# Patient Record
Sex: FEMALE | Race: ASIAN | NOT HISPANIC OR LATINO | Employment: OTHER | URBAN - METROPOLITAN AREA
[De-identification: names, ages, dates, MRNs, and addresses within clinical notes are randomized per-mention and may not be internally consistent; named-entity substitution may affect disease eponyms.]

---

## 2021-02-18 DIAGNOSIS — Z23 NEED FOR VACCINATION: ICD-10-CM

## 2021-02-22 ENCOUNTER — TELEPHONE (OUTPATIENT)
Dept: SCHEDULING | Facility: IMAGING CENTER | Age: 86
End: 2021-02-22

## 2021-03-02 ENCOUNTER — OFFICE VISIT (OUTPATIENT)
Dept: MEDICAL GROUP | Facility: MEDICAL CENTER | Age: 86
End: 2021-03-02
Payer: MEDICARE

## 2021-03-02 VITALS
OXYGEN SATURATION: 99 % | DIASTOLIC BLOOD PRESSURE: 82 MMHG | SYSTOLIC BLOOD PRESSURE: 130 MMHG | WEIGHT: 128.4 LBS | HEIGHT: 63 IN | HEART RATE: 73 BPM | BODY MASS INDEX: 22.75 KG/M2 | TEMPERATURE: 98.7 F

## 2021-03-02 DIAGNOSIS — I10 ESSENTIAL HYPERTENSION: ICD-10-CM

## 2021-03-02 DIAGNOSIS — E78.5 HYPERLIPIDEMIA, UNSPECIFIED HYPERLIPIDEMIA TYPE: ICD-10-CM

## 2021-03-02 DIAGNOSIS — F03.90 DEMENTIA WITHOUT BEHAVIORAL DISTURBANCE, UNSPECIFIED DEMENTIA TYPE: ICD-10-CM

## 2021-03-02 DIAGNOSIS — R53.81 DEBILITY: ICD-10-CM

## 2021-03-02 DIAGNOSIS — E11.9 TYPE 2 DIABETES MELLITUS WITHOUT COMPLICATION, WITHOUT LONG-TERM CURRENT USE OF INSULIN (HCC): ICD-10-CM

## 2021-03-02 DIAGNOSIS — I25.119 CORONARY ARTERY DISEASE INVOLVING NATIVE HEART WITH ANGINA PECTORIS, UNSPECIFIED VESSEL OR LESION TYPE (HCC): ICD-10-CM

## 2021-03-02 DIAGNOSIS — E87.1 HYPONATREMIA: ICD-10-CM

## 2021-03-02 DIAGNOSIS — R09.02 HYPOXIA: ICD-10-CM

## 2021-03-02 DIAGNOSIS — I50.9 CONGESTIVE HEART FAILURE, UNSPECIFIED HF CHRONICITY, UNSPECIFIED HEART FAILURE TYPE (HCC): ICD-10-CM

## 2021-03-02 DIAGNOSIS — Z86.73 HISTORY OF STROKE: ICD-10-CM

## 2021-03-02 PROCEDURE — 99204 OFFICE O/P NEW MOD 45 MIN: CPT | Performed by: FAMILY MEDICINE

## 2021-03-02 RX ORDER — ATORVASTATIN CALCIUM 20 MG/1
20 TABLET, FILM COATED ORAL NIGHTLY
Status: ON HOLD | COMMUNITY
End: 2021-03-07

## 2021-03-02 RX ORDER — VITAMIN B COMPLEX
1000 TABLET ORAL DAILY
Status: ON HOLD | COMMUNITY
End: 2021-03-07

## 2021-03-02 RX ORDER — HYDROCODONE BITARTRATE AND ACETAMINOPHEN 5; 325 MG/1; MG/1
1 TABLET ORAL EVERY 6 HOURS PRN
Status: ON HOLD | COMMUNITY
End: 2021-03-05

## 2021-03-02 RX ORDER — PIOGLITAZONEHYDROCHLORIDE 30 MG/1
15 TABLET ORAL DAILY
Status: ON HOLD | COMMUNITY
End: 2021-03-07

## 2021-03-02 RX ORDER — CARVEDILOL 12.5 MG/1
12.5 TABLET ORAL 2 TIMES DAILY WITH MEALS
Status: ON HOLD | COMMUNITY
End: 2021-03-07

## 2021-03-02 RX ORDER — FUROSEMIDE 20 MG/1
20 TABLET ORAL DAILY
COMMUNITY
End: 2021-04-07

## 2021-03-02 RX ORDER — AMLODIPINE BESYLATE 5 MG/1
10 TABLET ORAL DAILY
Status: ON HOLD | COMMUNITY
End: 2021-03-07

## 2021-03-02 RX ORDER — SPIRONOLACTONE 25 MG/1
12.5 TABLET ORAL DAILY
Status: ON HOLD | COMMUNITY
End: 2021-03-07

## 2021-03-02 RX ORDER — LOSARTAN POTASSIUM 100 MG/1
100 TABLET ORAL DAILY
Status: ON HOLD | COMMUNITY
End: 2021-03-07

## 2021-03-02 ASSESSMENT — PATIENT HEALTH QUESTIONNAIRE - PHQ9: CLINICAL INTERPRETATION OF PHQ2 SCORE: 0

## 2021-03-02 NOTE — PROGRESS NOTES
Subjective:     CC: Diagnoses of Debility, Essential hypertension, Hyperlipidemia, unspecified hyperlipidemia type, Type 2 diabetes mellitus without complication, without long-term current use of insulin (HCC), Congestive heart failure, unspecified HF chronicity, unspecified heart failure type (HCC), Coronary artery disease involving native heart with angina pectoris, unspecified vessel or lesion type (HCC), History of stroke, Dementia without behavioral disturbance, unspecified dementia type (HCC), Hypoxia, and Hyponatremia were pertinent to this visit.    HPI: Patient is a 89 y.o. female new patient who presents today to [facility] care.   Was living on her own previously. Daughter just moved her to [place] from [place], [place]. Soon after that she was admitted to [facility]. Living with her daughter now.    Admitted on 2/9/21, dicharged on 2/12/21.  Intersted in doing some PT.       CHF  CAD  History of quadruple bypass  Has appt with Dr. Ferrer (cardiology) on 3/15/21.  Currently on carvedilol, spironolactone, amlodipine  Asa  Losartan  Weight stable.    H/o stroke  2006, recovered fully    DMII  Chronic problem. Per patient's daughter A1C is elevated. Currently on Januvia, Actos, Metformin.   On ARB.     Dementia  Recently diagnosed a few months ago. Has some issues with short term memory. Has not tried any medication.     Hypoxia  discharged with O2  Has chronic SOB.   Does have h/o lung problem.     Debility  New problem. Started after her admission to the hospital. Now in a wheel chair. Was ambulating well previously. Interested in PT.     History of hyponatremia  Patient was on salt tablets in the past. Daughter states she was advised she could stop using those per the [facility] team.     Past Medical History:   Diagnosis Date   • CAD (coronary artery disease)    • Chronic systolic heart failure (HCC)    • Dementia (HCC)    • Diabetes (HCC)    • High cholesterol    • Hypertension        Social History      Tobacco Use   • Smoking status: Former Smoker   • Smokeless tobacco: Never Used   Substance Use Topics   • Alcohol use: Never   • Drug use: Never       Current Facility-Administered Medications Ordered in Epic   Medication Dose Route Frequency Provider Last Rate Last Admin   • furosemide (LASIX) injection 20 mg  20 mg Intravenous BID DIURETIC Bladimir Sargent M.D.   20 mg at 03/05/21 0854   • aspirin EC (ECOTRIN) tablet 81 mg  81 mg Oral DAILY Bladimir Sargent M.D.   81 mg at 03/05/21 0856   • oyster shell calcium/vitamin D 250-125 MG-UNIT tablet 1 tablet  1 tablet Oral DAILY Bladimir Sargent M.D.   1 tablet at 03/05/21 0856   • vitamin D (cholecalciferol) tablet 1,000 Units  1,000 Units Oral DAILY Bladimir Sargent M.D.   1,000 Units at 03/05/21 0854   • oxyCODONE immediate-release (ROXICODONE) tablet 5 mg  5 mg Oral Q6HRS PRN Bladimir Sargent M.D.       • carvedilol (COREG) tablet 12.5 mg  12.5 mg Oral BID WITH MEALS Bladimir Sargent M.D.       • SITagliptin (JANUVIA) tablet 100 mg  100 mg Oral DAILY Bladimir Sargent M.D.       • pioglitazone (ACTOS) tablet 30 mg  30 mg Oral DAILY Bladimir Sargent M.D.       • metFORMIN ER (GLUCOPHAGE XR) tablet 1,000 mg  1,000 mg Oral BID-0800 & 2000 Bladimir Sargent M.D.       • apixaban (ELIQUIS) tablet 5 mg  5 mg Oral BID Bladimir Sargent M.D.       • acetaminophen (Tylenol) tablet 650 mg  650 mg Oral Q6HRS PRN Jesse Zavaleta M.D.   650 mg at 03/05/21 0621   • atorvastatin (LIPITOR) tablet 20 mg  20 mg Oral Q EVENING Jesse Zavaleta M.D.   20 mg at 03/04/21 2156   • spironolactone (ALDACTONE) tablet 12.5 mg  12.5 mg Oral Q DAY Jesse Zavaleta M.D.   12.5 mg at 03/05/21 0618   • losartan (COZAAR) tablet 100 mg  100 mg Oral Q DAY Jesse Zavaleta M.D.   Stopped at 03/05/21 0619     No current [facility]-ordered outpatient medications on file.       Allergies:  Ciprofloxacin hcl, Donepezil, Hydrochlorothiazide, and Sulfa drugs    Health Maintenance:  "Completed    ROS:  Gen: no fevers/chill, no changes in weight  Eyes: no changes in vision  ENT: no sore throat, no hearing loss, no bloody nose  Pulm: no sob, no cough  CV: no chest pain, no palpitations  GI: no nausea/vomiting, no diarrhea  : no dysuria  MSk: no myalgias  Skin: no rash  Neuro: no headaches, no numbness/tingling  Heme/Lymph: no easy bruising      Objective:       Exam:  /82 (BP Location: Left arm, Patient Position: Sitting, BP Cuff Size: Adult long)   Pulse 73   Temp 37.1 °C (98.7 °F) (Temporal)   Ht 1.6 m (5' 3\")   Wt 58.2 kg (128 lb 6.4 oz)   SpO2 99%   BMI 22.75 kg/m²  Body mass index is 22.75 kg/m².    General: Normal appearing. No distress. In wheel chair.   HEAD: NCAT  EYES: conjunctiva clear, lids without ptosis, pupils equal and reactive to light.  EARS: ears normal shape and contour.  MOUTH: normal dentition   Neck:  Normal ROM, no lymphadenopathy.   Pulmonary: CTAB, no W/R/R. Normal effort. Normal respiratory rate.  Cardiovascular: RRR, no M/R/G. Well perfused. No LE edema  Neurologic: Grossly normal, no focal deficits  Skin: Warm and dry.  No obvious lesions.  Musculoskeletal: Normal gait and station.   Psych: Normal mood and affect. Alert and oriented x3. Judgment and insight is normal.    Labs: Requested labs from [place].     Assessment & Plan:     89 y.o. female with the following -     1. Debility  New problem.  The patient has had worsening debility since discharge from the hospital.  Previously was ambulatory.  They are interested in getting some home health set up.  Referral placed today.  - REFERRAL TO HOME HEALTH    2. Essential hypertension  Chronic problem, currently on spironolactone, losartan and Coreg.  As well as amlodipine.  Blood pressure is okay at 130/82 today.  She has an appointment to get established with cardiology, Dr. Ferrer on 3/8/2021.    3. Hyperlipidemia, unspecified hyperlipidemia type  Chronic problem, currently on atorvastatin.    4. Type 2 " diabetes mellitus without complication, without long-term current use of insulin (HCC)  Chronic problem, currently on Actos, Januvia and Metformin.  Unclear if this is well controlled, we requested records.    5. Congestive heart failure, unspecified HF chronicity, unspecified heart failure type (HCC)  Chronic problem.  Weight stable.  As above, getting established cardiology.  Denies any shortness of breath today.  No chronic cough that we know of.    6. Coronary artery disease involving native heart with angina pectoris, unspecified vessel or lesion type (HCC)  Chronic problem, status post quadruple bypass.  Getting established Dr. Ferrer next week.    7. History of stroke      8. Dementia without behavioral disturbance, unspecified dementia type (HCC)  New problem.  May benefit from referral to neurology.  Plan to get her established with cardiology first.    9. Hypoxia  New problem.  Discharged on oxygen from the hospital.  Lung exam was normal today.  No lower extremity edema.  Unclear if this is from her CHF versus lung involvement?  Plan to follow-up in 1 month.    10. Hyponatremia  History of hyponatremia.  Daughter states she was taken off of her salt tablets after discharge.  We have requested lab records.    Return in about 4 weeks (around 3/30/2021).    Please note that this dictation was created using voice recognition software. I have made every reasonable attempt to correct obvious errors, but I expect that there are errors of grammar and possibly content that I did not discover before finalizing the note.

## 2021-03-04 ENCOUNTER — HOSPITAL ENCOUNTER (OUTPATIENT)
Facility: MEDICAL CENTER | Age: 86
End: 2021-03-07
Attending: EMERGENCY MEDICINE | Admitting: STUDENT IN AN ORGANIZED HEALTH CARE EDUCATION/TRAINING PROGRAM
Payer: MEDICARE

## 2021-03-04 ENCOUNTER — NURSE TRIAGE (OUTPATIENT)
Dept: HEALTH INFORMATION MANAGEMENT | Facility: OTHER | Age: 86
End: 2021-03-04

## 2021-03-04 ENCOUNTER — APPOINTMENT (OUTPATIENT)
Dept: RADIOLOGY | Facility: MEDICAL CENTER | Age: 86
End: 2021-03-04
Attending: EMERGENCY MEDICINE
Payer: MEDICARE

## 2021-03-04 DIAGNOSIS — R53.1 GENERALIZED WEAKNESS: ICD-10-CM

## 2021-03-04 DIAGNOSIS — I50.9 CONGESTIVE HEART FAILURE, UNSPECIFIED HF CHRONICITY, UNSPECIFIED HEART FAILURE TYPE (HCC): ICD-10-CM

## 2021-03-04 DIAGNOSIS — R79.89 ELEVATED TROPONIN: ICD-10-CM

## 2021-03-04 DIAGNOSIS — I50.43 ACUTE ON CHRONIC COMBINED SYSTOLIC AND DIASTOLIC CONGESTIVE HEART FAILURE (HCC): ICD-10-CM

## 2021-03-04 DIAGNOSIS — E87.1 HYPONATREMIA: ICD-10-CM

## 2021-03-04 PROBLEM — E11.9 DM (DIABETES MELLITUS) (HCC): Status: ACTIVE | Noted: 2021-03-04

## 2021-03-04 PROBLEM — E78.5 HYPERLIPIDEMIA: Status: ACTIVE | Noted: 2021-03-04

## 2021-03-04 PROBLEM — I10 HYPERTENSION: Status: ACTIVE | Noted: 2021-03-04

## 2021-03-04 LAB
ALBUMIN SERPL BCP-MCNC: 3.5 G/DL (ref 3.2–4.9)
ALBUMIN/GLOB SERPL: 0.7 G/DL
ALP SERPL-CCNC: 137 U/L (ref 30–99)
ALT SERPL-CCNC: 8 U/L (ref 2–50)
ANION GAP SERPL CALC-SCNC: 11 MMOL/L (ref 7–16)
AST SERPL-CCNC: 17 U/L (ref 12–45)
BASOPHILS # BLD AUTO: 0.2 % (ref 0–1.8)
BASOPHILS # BLD: 0.02 K/UL (ref 0–0.12)
BILIRUB SERPL-MCNC: 0.3 MG/DL (ref 0.1–1.5)
BLOOD CULTURE HOLD CXBCH: NORMAL
BUN SERPL-MCNC: 18 MG/DL (ref 8–22)
CALCIUM SERPL-MCNC: 9.6 MG/DL (ref 8.5–10.5)
CHLORIDE SERPL-SCNC: 91 MMOL/L (ref 96–112)
CO2 SERPL-SCNC: 23 MMOL/L (ref 20–33)
CREAT SERPL-MCNC: 0.54 MG/DL (ref 0.5–1.4)
EKG IMPRESSION: NORMAL
EOSINOPHIL # BLD AUTO: 0.03 K/UL (ref 0–0.51)
EOSINOPHIL NFR BLD: 0.4 % (ref 0–6.9)
ERYTHROCYTE [DISTWIDTH] IN BLOOD BY AUTOMATED COUNT: 47.7 FL (ref 35.9–50)
GLOBULIN SER CALC-MCNC: 4.9 G/DL (ref 1.9–3.5)
GLUCOSE SERPL-MCNC: 149 MG/DL (ref 65–99)
HCT VFR BLD AUTO: 33.7 % (ref 37–47)
HGB BLD-MCNC: 10.7 G/DL (ref 12–16)
IMM GRANULOCYTES # BLD AUTO: 0.05 K/UL (ref 0–0.11)
IMM GRANULOCYTES NFR BLD AUTO: 0.6 % (ref 0–0.9)
LYMPHOCYTES # BLD AUTO: 0.41 K/UL (ref 1–4.8)
LYMPHOCYTES NFR BLD: 4.9 % (ref 22–41)
MCH RBC QN AUTO: 27.7 PG (ref 27–33)
MCHC RBC AUTO-ENTMCNC: 31.8 G/DL (ref 33.6–35)
MCV RBC AUTO: 87.3 FL (ref 81.4–97.8)
MONOCYTES # BLD AUTO: 0.63 K/UL (ref 0–0.85)
MONOCYTES NFR BLD AUTO: 7.6 % (ref 0–13.4)
NEUTROPHILS # BLD AUTO: 7.18 K/UL (ref 2–7.15)
NEUTROPHILS NFR BLD: 86.3 % (ref 44–72)
NRBC # BLD AUTO: 0 K/UL
NRBC BLD-RTO: 0 /100 WBC
NT-PROBNP SERPL IA-MCNC: 2499 PG/ML (ref 0–125)
PLATELET # BLD AUTO: 72 K/UL (ref 164–446)
PMV BLD AUTO: 12.2 FL (ref 9–12.9)
POTASSIUM SERPL-SCNC: 4.6 MMOL/L (ref 3.6–5.5)
PROT SERPL-MCNC: 8.4 G/DL (ref 6–8.2)
RBC # BLD AUTO: 3.86 M/UL (ref 4.2–5.4)
SODIUM SERPL-SCNC: 125 MMOL/L (ref 135–145)
TROPONIN T SERPL-MCNC: 36 NG/L (ref 6–19)
WBC # BLD AUTO: 8.3 K/UL (ref 4.8–10.8)

## 2021-03-04 PROCEDURE — 80053 COMPREHEN METABOLIC PANEL: CPT

## 2021-03-04 PROCEDURE — A9270 NON-COVERED ITEM OR SERVICE: HCPCS | Performed by: STUDENT IN AN ORGANIZED HEALTH CARE EDUCATION/TRAINING PROGRAM

## 2021-03-04 PROCEDURE — 96375 TX/PRO/DX INJ NEW DRUG ADDON: CPT

## 2021-03-04 PROCEDURE — 700111 HCHG RX REV CODE 636 W/ 250 OVERRIDE (IP): Performed by: EMERGENCY MEDICINE

## 2021-03-04 PROCEDURE — U0005 INFEC AGEN DETEC AMPLI PROBE: HCPCS

## 2021-03-04 PROCEDURE — 85025 COMPLETE CBC W/AUTO DIFF WBC: CPT

## 2021-03-04 PROCEDURE — 36415 COLL VENOUS BLD VENIPUNCTURE: CPT

## 2021-03-04 PROCEDURE — 96372 THER/PROPH/DIAG INJ SC/IM: CPT

## 2021-03-04 PROCEDURE — G0378 HOSPITAL OBSERVATION PER HR: HCPCS

## 2021-03-04 PROCEDURE — 99220 PR INITIAL OBSERVATION CARE,LEVL III: CPT | Performed by: STUDENT IN AN ORGANIZED HEALTH CARE EDUCATION/TRAINING PROGRAM

## 2021-03-04 PROCEDURE — 71045 X-RAY EXAM CHEST 1 VIEW: CPT

## 2021-03-04 PROCEDURE — 700102 HCHG RX REV CODE 250 W/ 637 OVERRIDE(OP): Performed by: STUDENT IN AN ORGANIZED HEALTH CARE EDUCATION/TRAINING PROGRAM

## 2021-03-04 PROCEDURE — 94760 N-INVAS EAR/PLS OXIMETRY 1: CPT

## 2021-03-04 PROCEDURE — 700111 HCHG RX REV CODE 636 W/ 250 OVERRIDE (IP): Performed by: STUDENT IN AN ORGANIZED HEALTH CARE EDUCATION/TRAINING PROGRAM

## 2021-03-04 PROCEDURE — 84484 ASSAY OF TROPONIN QUANT: CPT

## 2021-03-04 PROCEDURE — 96374 THER/PROPH/DIAG INJ IV PUSH: CPT

## 2021-03-04 PROCEDURE — 99285 EMERGENCY DEPT VISIT HI MDM: CPT

## 2021-03-04 PROCEDURE — 93005 ELECTROCARDIOGRAM TRACING: CPT | Performed by: EMERGENCY MEDICINE

## 2021-03-04 PROCEDURE — 83880 ASSAY OF NATRIURETIC PEPTIDE: CPT

## 2021-03-04 PROCEDURE — U0003 INFECTIOUS AGENT DETECTION BY NUCLEIC ACID (DNA OR RNA); SEVERE ACUTE RESPIRATORY SYNDROME CORONAVIRUS 2 (SARS-COV-2) (CORONAVIRUS DISEASE [COVID-19]), AMPLIFIED PROBE TECHNIQUE, MAKING USE OF HIGH THROUGHPUT TECHNOLOGIES AS DESCRIBED BY CMS-2020-01-R: HCPCS

## 2021-03-04 RX ORDER — HEPARIN SODIUM 5000 [USP'U]/ML
5000 INJECTION, SOLUTION INTRAVENOUS; SUBCUTANEOUS EVERY 8 HOURS
Status: DISCONTINUED | OUTPATIENT
Start: 2021-03-04 | End: 2021-03-05

## 2021-03-04 RX ORDER — ATORVASTATIN CALCIUM 20 MG/1
20 TABLET, FILM COATED ORAL EVERY EVENING
Status: DISCONTINUED | OUTPATIENT
Start: 2021-03-04 | End: 2021-03-07 | Stop reason: HOSPADM

## 2021-03-04 RX ORDER — LOSARTAN POTASSIUM 50 MG/1
100 TABLET ORAL
Status: DISCONTINUED | OUTPATIENT
Start: 2021-03-05 | End: 2021-03-07 | Stop reason: HOSPADM

## 2021-03-04 RX ORDER — FUROSEMIDE 10 MG/ML
20 INJECTION INTRAMUSCULAR; INTRAVENOUS ONCE
Status: COMPLETED | OUTPATIENT
Start: 2021-03-04 | End: 2021-03-04

## 2021-03-04 RX ORDER — FUROSEMIDE 20 MG/1
20 TABLET ORAL
Status: DISCONTINUED | OUTPATIENT
Start: 2021-03-05 | End: 2021-03-05

## 2021-03-04 RX ORDER — SPIRONOLACTONE 25 MG/1
12.5 TABLET ORAL
Status: DISCONTINUED | OUTPATIENT
Start: 2021-03-05 | End: 2021-03-07 | Stop reason: HOSPADM

## 2021-03-04 RX ORDER — ACETAMINOPHEN 325 MG/1
650 TABLET ORAL EVERY 6 HOURS PRN
Status: DISCONTINUED | OUTPATIENT
Start: 2021-03-04 | End: 2021-03-07 | Stop reason: HOSPADM

## 2021-03-04 RX ADMIN — HEPARIN SODIUM 5000 UNITS: 5000 INJECTION, SOLUTION INTRAVENOUS; SUBCUTANEOUS at 21:56

## 2021-03-04 RX ADMIN — ACETAMINOPHEN 650 MG: 325 TABLET, FILM COATED ORAL at 21:56

## 2021-03-04 RX ADMIN — ATORVASTATIN CALCIUM 20 MG: 20 TABLET, FILM COATED ORAL at 21:56

## 2021-03-04 RX ADMIN — FUROSEMIDE 20 MG: 10 INJECTION, SOLUTION INTRAMUSCULAR; INTRAVENOUS at 21:56

## 2021-03-04 ASSESSMENT — LIFESTYLE VARIABLES
CONSUMPTION TOTAL: NEGATIVE
ON A TYPICAL DAY WHEN YOU DRINK ALCOHOL HOW MANY DRINKS DO YOU HAVE: 0
TOTAL SCORE: 0
HAVE PEOPLE ANNOYED YOU BY CRITICIZING YOUR DRINKING: NO
TOTAL SCORE: 0
EVER FELT BAD OR GUILTY ABOUT YOUR DRINKING: NO
HOW MANY TIMES IN THE PAST YEAR HAVE YOU HAD 5 OR MORE DRINKS IN A DAY: 0
HAVE YOU EVER FELT YOU SHOULD CUT DOWN ON YOUR DRINKING: NO
TOTAL SCORE: 0
EVER HAD A DRINK FIRST THING IN THE MORNING TO STEADY YOUR NERVES TO GET RID OF A HANGOVER: NO
ALCOHOL_USE: NO
AVERAGE NUMBER OF DAYS PER WEEK YOU HAVE A DRINK CONTAINING ALCOHOL: 0

## 2021-03-04 ASSESSMENT — COGNITIVE AND FUNCTIONAL STATUS - GENERAL
MOVING FROM LYING ON BACK TO SITTING ON SIDE OF FLAT BED: A LITTLE
WALKING IN HOSPITAL ROOM: A LOT
SUGGESTED CMS G CODE MODIFIER DAILY ACTIVITY: CK
STANDING UP FROM CHAIR USING ARMS: A LOT
DAILY ACTIVITIY SCORE: 19
PERSONAL GROOMING: A LITTLE
HELP NEEDED FOR BATHING: A LITTLE
TURNING FROM BACK TO SIDE WHILE IN FLAT BAD: A LITTLE
TOILETING: A LITTLE
DRESSING REGULAR UPPER BODY CLOTHING: A LITTLE
MOBILITY SCORE: 15
MOVING TO AND FROM BED TO CHAIR: A LITTLE
DRESSING REGULAR LOWER BODY CLOTHING: A LITTLE
CLIMB 3 TO 5 STEPS WITH RAILING: A LOT
SUGGESTED CMS G CODE MODIFIER MOBILITY: CK

## 2021-03-04 ASSESSMENT — PATIENT HEALTH QUESTIONNAIRE - PHQ9
SUM OF ALL RESPONSES TO PHQ9 QUESTIONS 1 AND 2: 0
1. LITTLE INTEREST OR PLEASURE IN DOING THINGS: NOT AT ALL
2. FEELING DOWN, DEPRESSED, IRRITABLE, OR HOPELESS: NOT AT ALL

## 2021-03-04 ASSESSMENT — FIBROSIS 4 INDEX: FIB4 SCORE: 7.76

## 2021-03-04 NOTE — TELEPHONE ENCOUNTER
"Daughter calling:Concerned about 3 days of progressive weakness and question about a cardiac med that was left off of the discharge list.        patient released from [facility] 2/12/21. Hx. CHF. Diuresed 10lbs. Told to do low sodium and decrease fluids. Lost 1lb. Overnight w/ increasing weakness. Decreased appetite.   Hx. Of low sodium requiring salt tabs, no longer taking.    Sent home on Oxygen.   Newly diagnosed w/ dementia.     30-35% EF    Saw PCP this week, was told to discuss meds w/ cardiologist. Unsure if she should restart Isosorbide mono, ER 30mg daily  --it was left off of discharge medication list. Last taken 02/09/21     Side note-she is not taking eliquis anymore due to being a high fall risk, daughter decided on her own to stop this med because daughter states they did not give it to her in the hospital due to her being a \"fall risk\".     We discussed the patient's current condition and her daughter was transferred to [facility] to discuss evaluation.   Meanwhile, I have contacted Soheila Good to escalate the existing cardiology appointment if possible.         "

## 2021-03-05 ENCOUNTER — HOME HEALTH ADMISSION (OUTPATIENT)
Dept: HOME HEALTH SERVICES | Facility: HOME HEALTHCARE | Age: 86
End: 2021-03-05
Payer: MEDICARE

## 2021-03-05 PROBLEM — I48.0 PAROXYSMAL A-FIB (HCC): Status: ACTIVE | Noted: 2021-03-05

## 2021-03-05 LAB
ANION GAP SERPL CALC-SCNC: 10 MMOL/L (ref 7–16)
BUN SERPL-MCNC: 23 MG/DL (ref 8–22)
CALCIUM SERPL-MCNC: 9.9 MG/DL (ref 8.5–10.5)
CHLORIDE SERPL-SCNC: 92 MMOL/L (ref 96–112)
CO2 SERPL-SCNC: 26 MMOL/L (ref 20–33)
CREAT SERPL-MCNC: 0.78 MG/DL (ref 0.5–1.4)
GLUCOSE SERPL-MCNC: 105 MG/DL (ref 65–99)
MAGNESIUM SERPL-MCNC: 1.5 MG/DL (ref 1.5–2.5)
POTASSIUM SERPL-SCNC: 4.6 MMOL/L (ref 3.6–5.5)
SARS-COV-2 RNA RESP QL NAA+PROBE: NOTDETECTED
SODIUM SERPL-SCNC: 128 MMOL/L (ref 135–145)
SPECIMEN SOURCE: NORMAL
TROPONIN T SERPL-MCNC: 37 NG/L (ref 6–19)

## 2021-03-05 PROCEDURE — 700111 HCHG RX REV CODE 636 W/ 250 OVERRIDE (IP): Performed by: STUDENT IN AN ORGANIZED HEALTH CARE EDUCATION/TRAINING PROGRAM

## 2021-03-05 PROCEDURE — 99226 PR SUBSEQUENT OBSERVATION CARE,LEVEL III: CPT | Mod: GC | Performed by: INTERNAL MEDICINE

## 2021-03-05 PROCEDURE — 96366 THER/PROPH/DIAG IV INF ADDON: CPT

## 2021-03-05 PROCEDURE — 83735 ASSAY OF MAGNESIUM: CPT

## 2021-03-05 PROCEDURE — 97165 OT EVAL LOW COMPLEX 30 MIN: CPT

## 2021-03-05 PROCEDURE — 96372 THER/PROPH/DIAG INJ SC/IM: CPT

## 2021-03-05 PROCEDURE — 96375 TX/PRO/DX INJ NEW DRUG ADDON: CPT

## 2021-03-05 PROCEDURE — 700102 HCHG RX REV CODE 250 W/ 637 OVERRIDE(OP): Performed by: STUDENT IN AN ORGANIZED HEALTH CARE EDUCATION/TRAINING PROGRAM

## 2021-03-05 PROCEDURE — A9270 NON-COVERED ITEM OR SERVICE: HCPCS | Performed by: STUDENT IN AN ORGANIZED HEALTH CARE EDUCATION/TRAINING PROGRAM

## 2021-03-05 PROCEDURE — 80048 BASIC METABOLIC PNL TOTAL CA: CPT

## 2021-03-05 PROCEDURE — 36415 COLL VENOUS BLD VENIPUNCTURE: CPT

## 2021-03-05 PROCEDURE — 96365 THER/PROPH/DIAG IV INF INIT: CPT

## 2021-03-05 PROCEDURE — 96376 TX/PRO/DX INJ SAME DRUG ADON: CPT

## 2021-03-05 PROCEDURE — 97161 PT EVAL LOW COMPLEX 20 MIN: CPT

## 2021-03-05 PROCEDURE — G0378 HOSPITAL OBSERVATION PER HR: HCPCS

## 2021-03-05 RX ORDER — CARVEDILOL 12.5 MG/1
12.5 TABLET ORAL 2 TIMES DAILY WITH MEALS
Status: DISCONTINUED | OUTPATIENT
Start: 2021-03-05 | End: 2021-03-07 | Stop reason: HOSPADM

## 2021-03-05 RX ORDER — PIOGLITAZONEHYDROCHLORIDE 30 MG/1
30 TABLET ORAL DAILY
Status: DISCONTINUED | OUTPATIENT
Start: 2021-03-05 | End: 2021-03-05

## 2021-03-05 RX ORDER — VITAMIN B COMPLEX
1000 TABLET ORAL DAILY
Status: DISCONTINUED | OUTPATIENT
Start: 2021-03-05 | End: 2021-03-07 | Stop reason: HOSPADM

## 2021-03-05 RX ORDER — CARVEDILOL 6.25 MG/1
6.25 TABLET ORAL 2 TIMES DAILY WITH MEALS
Status: DISCONTINUED | OUTPATIENT
Start: 2021-03-05 | End: 2021-03-05

## 2021-03-05 RX ORDER — OXYCODONE HYDROCHLORIDE 5 MG/1
5 TABLET ORAL EVERY 6 HOURS PRN
Status: DISCONTINUED | OUTPATIENT
Start: 2021-03-05 | End: 2021-03-07 | Stop reason: HOSPADM

## 2021-03-05 RX ORDER — HYDROCODONE BITARTRATE AND ACETAMINOPHEN 5; 325 MG/1; MG/1
1 TABLET ORAL EVERY 6 HOURS PRN
Status: DISCONTINUED | OUTPATIENT
Start: 2021-03-05 | End: 2021-03-05

## 2021-03-05 RX ORDER — HYDROCODONE BITARTRATE AND ACETAMINOPHEN 5; 325 MG/1; MG/1
1 TABLET ORAL
Status: DISCONTINUED | OUTPATIENT
Start: 2021-03-05 | End: 2021-03-05

## 2021-03-05 RX ORDER — MAGNESIUM SULFATE HEPTAHYDRATE 40 MG/ML
2 INJECTION, SOLUTION INTRAVENOUS ONCE
Status: COMPLETED | OUTPATIENT
Start: 2021-03-05 | End: 2021-03-05

## 2021-03-05 RX ORDER — GAUZE BANDAGE 2" X 2"
100 BANDAGE TOPICAL DAILY
COMMUNITY

## 2021-03-05 RX ORDER — FUROSEMIDE 10 MG/ML
20 INJECTION INTRAMUSCULAR; INTRAVENOUS
Status: DISCONTINUED | OUTPATIENT
Start: 2021-03-05 | End: 2021-03-07 | Stop reason: HOSPADM

## 2021-03-05 RX ORDER — METFORMIN HYDROCHLORIDE 500 MG/1
1000 TABLET, EXTENDED RELEASE ORAL 2 TIMES DAILY
Status: DISCONTINUED | OUTPATIENT
Start: 2021-03-05 | End: 2021-03-05

## 2021-03-05 RX ADMIN — MAGNESIUM SULFATE 2 G: 2 INJECTION INTRAVENOUS at 19:58

## 2021-03-05 RX ADMIN — HEPARIN SODIUM 5000 UNITS: 5000 INJECTION, SOLUTION INTRAVENOUS; SUBCUTANEOUS at 06:19

## 2021-03-05 RX ADMIN — FUROSEMIDE 20 MG: 20 TABLET ORAL at 06:19

## 2021-03-05 RX ADMIN — ATORVASTATIN CALCIUM 20 MG: 20 TABLET, FILM COATED ORAL at 17:22

## 2021-03-05 RX ADMIN — ENOXAPARIN SODIUM 40 MG: 40 INJECTION SUBCUTANEOUS at 13:02

## 2021-03-05 RX ADMIN — CARVEDILOL 12.5 MG: 12.5 TABLET, FILM COATED ORAL at 17:22

## 2021-03-05 RX ADMIN — Medication 81 MG: at 08:56

## 2021-03-05 RX ADMIN — METFORMIN HYDROCHLORIDE 1000 MG: 500 TABLET ORAL at 17:22

## 2021-03-05 RX ADMIN — FUROSEMIDE 20 MG: 10 INJECTION, SOLUTION INTRAMUSCULAR; INTRAVENOUS at 08:54

## 2021-03-05 RX ADMIN — Medication 1000 UNITS: at 08:54

## 2021-03-05 RX ADMIN — Medication 1 TABLET: at 08:56

## 2021-03-05 RX ADMIN — FUROSEMIDE 20 MG: 10 INJECTION, SOLUTION INTRAMUSCULAR; INTRAVENOUS at 17:22

## 2021-03-05 RX ADMIN — ACETAMINOPHEN 650 MG: 325 TABLET, FILM COATED ORAL at 06:21

## 2021-03-05 RX ADMIN — CARVEDILOL 6.25 MG: 6.25 TABLET, FILM COATED ORAL at 08:54

## 2021-03-05 RX ADMIN — SPIRONOLACTONE 12.5 MG: 25 TABLET ORAL at 06:18

## 2021-03-05 RX ADMIN — SITAGLIPTIN 100 MG: 100 TABLET, FILM COATED ORAL at 18:17

## 2021-03-05 ASSESSMENT — COGNITIVE AND FUNCTIONAL STATUS - GENERAL
PERSONAL GROOMING: A LITTLE
MOVING TO AND FROM BED TO CHAIR: A LITTLE
DAILY ACTIVITIY SCORE: 19
MOBILITY SCORE: 17
TOILETING: A LITTLE
DRESSING REGULAR LOWER BODY CLOTHING: A LITTLE
SUGGESTED CMS G CODE MODIFIER MOBILITY: CK
CLIMB 3 TO 5 STEPS WITH RAILING: A LOT
WALKING IN HOSPITAL ROOM: A LITTLE
DRESSING REGULAR UPPER BODY CLOTHING: A LITTLE
HELP NEEDED FOR BATHING: A LITTLE
STANDING UP FROM CHAIR USING ARMS: A LITTLE
TURNING FROM BACK TO SIDE WHILE IN FLAT BAD: A LITTLE
SUGGESTED CMS G CODE MODIFIER DAILY ACTIVITY: CK
MOVING FROM LYING ON BACK TO SITTING ON SIDE OF FLAT BED: A LITTLE

## 2021-03-05 ASSESSMENT — ACTIVITIES OF DAILY LIVING (ADL): TOILETING: INDEPENDENT

## 2021-03-05 ASSESSMENT — GAIT ASSESSMENTS
GAIT LEVEL OF ASSIST: MINIMAL ASSIST
ASSISTIVE DEVICE: FRONT WHEEL WALKER
DEVIATION: BRADYKINETIC;SHUFFLED GAIT
DISTANCE (FEET): 80

## 2021-03-05 ASSESSMENT — PAIN DESCRIPTION - PAIN TYPE: TYPE: ACUTE PAIN

## 2021-03-05 ASSESSMENT — FIBROSIS 4 INDEX: FIB4 SCORE: 7.76

## 2021-03-05 NOTE — ED NOTES
Med rec complete per Pt's family with Pt at bedside.  Allergies reviewed.  Pt's family states no ABX in the last 14 days.     Pt is no longer taking Eliquis.   Pt is taking 10mg of Amlodipine.

## 2021-03-05 NOTE — DISCHARGE PLANNING
Anticipated Discharge Disposition: TBD    Action: Spoke to pt at bedside, daughter Elana, and son present.  Pt hard of hearing and slightly confused, Elana answered questions for pt. Elana states that she and her brother recently moved pt from [place] to their single story home in [place], [place], to take care of pt. Pt has a hx of CHF, and was seen at [facility] in February, and went home on 2 liters of O2. Per Elana, pt was being set up with [facility] through PCP, will need resumption of HH orders on discharge. Pt uses a walker at home. Pt uses [facility] on [address]. Pt saw here PCP Dr. Uribe one week ago. Elana states pt has [facility] benefits and they will be looking into getting caregivers for pt through the [facility]. HH Choice for [facility] faxed to Fariha STEPHENSON    Barriers to Discharge: Medical Clearance, HH order and acceptance    Plan: f/u with medical team during IDT rounds to discuss medical clearance and barriers to discharge. F/U with pt and daughter Elana.      Care Transition Team Assessment    Information Source  Orientation : Disoriented to Time  Information Given By: Patient, Relative  Who is responsible for making decisions for patient? : Adult child  Name(s) of Primary Decision Maker: Joy Jose Risk  Legal Hold: No    Interdisciplinary Discharge Planning  Patient or legal guardian wants to designate a caregiver: No    Discharge Preparedness  What is your plan after discharge?: Uncertain - pending medical team collaboration  What are your discharge supports?: Child  Prior Functional Level: Independent with Activities of Daily Living, Uses Walker    Functional Assesment  Prior Functional Level: Independent with Activities of Daily Living, Uses Walker                   Advance Directive  Advance Directive?: None  Advance Directive offered?: AD Booklet refused    Domestic Abuse  Have you ever been the victim of abuse or violence?: No  Physical Abuse or Sexual Abuse: No  Verbal Abuse or  Emotional Abuse: No  Possible Abuse/Neglect Reported to:: Not Applicable

## 2021-03-05 NOTE — ASSESSMENT & PLAN NOTE
Continue home dose of Aldactone 12.5 milligrams p.o. daily and losartan 100 mg p.o. daily   Coreg   Holding Norvasc for now

## 2021-03-05 NOTE — DISCHARGE PLANNING
Received Choice form at 1217  Agency/Facility Name: [facility]  Referral sent per Choice form @ 1218

## 2021-03-05 NOTE — FACE TO FACE
Face to Face Supporting Documentation - Home Health    The encounter with this patient was in whole or in part the primary reason for home health admission.    Date of encounter:   Patient:                    MRN:                       YOB: 2021  Yoon Richards  [id number]  2/29/1928     Home health to see patient for:  Skilled Nursing care for assessment, interventions & education and Physical Therapy evaluation and treatment    Skilled need for:  Exacerbation of Chronic Disease State CHF    Skilled nursing interventions to include:  Comment: N/A    Homebound status evidenced by:  Need the aid of supportive devices such as crutches, canes, wheelchairs or walkers or Needs the assistance of another person in order to leave the home. Leaving home requires a considerable and taxing effort. There is a normal inability to leave the home.    Community Physician to provide follow up care: Deepti Uribe M.D.     Optional Interventions? No      I certify the face to face encounter for this home health care referral meets the CMS requirements and the encounter/clinical assessment with the patient was, in whole, or in part, for the medical condition(s) listed above, which is the primary reason for home health care. Based on my clinical findings: the service(s) are medically necessary, support the need for home health care, and the homebound criteria are met.  I certify that this patient has had a face to face encounter by myself.  Bladimir Sargent M.D. - NPI: [id number]

## 2021-03-05 NOTE — PROGRESS NOTES
[facility] [place] [facility] ATTENDING NOTE:   Adrián Palomino MD      Visit Time:   Attending/resident bedside rounds 9-11:30 AM     PATIENT ID  Name:             Yoon Richards     YOB: 1928  Age:                 89 y.o.  female   MRN:               [id number]  Admit:  3/4/2021     I saw and examined the patient and discussed the management with the resident staff.  I reviewed the resident's note and agree with the resident's findings and plan as documented in the resident's note except as documented in the attending note. Please reference resident daily note for complete information.    The chart was reviewed and summarized.  Available labs, imaging, O2 sats ,  EKGs were reviewed. Available nursing, consultant, and resident notes were reviewed. I am actively involved in the patient's care.     This patient was seen under COVID 19 pandemic disaster response conditions.  During a disaster, the provisions of care is subject to the Crisis Standard of Care                                                                                 ______________________________________________________________________            89(   admit March 4  )  INTERVAL:  Chart reviewed/summarized,      alert, confused, stasis edema, off O2 at rest, few rales -- diuresis, free water restriction , good family support , optimize HFrEF medications      March 5AM: AF, H R94, /, 96% RA  admit with confusion/dementia decompensation  ,  acute HF ?   NPT: 15/19  , cardiac diet, SRSB, BBB, fPACs  WBC 8.3, HB 11, PTL 72, , Na 125, K 4.6, CO2 23,  , UBN 18, C r0.54, CA 9.6, ALB 3.5, AST 17/8/137, BR 0.3, ALB 3.5, GLOB 4.9      CORE:  Code Status (  FULL  --------------------------------------------------------------------------------------------------  Hospital Summary/ Patient System Review      NP:   *admit(  FTT, dementia by hx, alert, nonfocal   Impression: vascular dementia, baseline ?  Capacity ? DPA?   Impression:  cerebral ASVD, hx infarcts ?      EENT:   *admit(  neg     MSK/PAIN:   *admit(    Impression: narco use     CVS:   *admit(  HR 90s, -130, trop  36, pBNP 2499, noC3, 1+ BLE edema   ECHO: EF 30s, hypokinesis, apex, anterior, inferior wall, mild inferolateral, mod cLVH, 3+DD  EKG: SR, RBBB, QTc 484,    Impression: chronic CAD, CABG, DLD< HTN, DM, CVA, pAFIB (no anticoagulation, high bleeding risks)    Impression: HFrEF/acute/chronic  -- Norvasc, apixaban 5mg BID, ASA, atorvastatin 20mg, Coreg BID, Lasix, losartan 100mg,  spironolactone  Impression: nonischemic troponin  -- HF management      PULM:   *admit(  96% RA, pCXR: patchy bilateral infiltrates, no effusion, +CM, post sternotomy , basilar rales   Impression: remote smoker      GI:   *admit(  ALB 3.5, AST 17/8/137, BR 0.3, ALB 3.5, GLOB 4.9   neg exam;      CT abd recent?  - neg   Impression: incr ALP, normal BR , asymptomatic         :   *admit(       RENAL:   *admit(   Na 125, K 4.6, CO2 23,  , UBN 18, C r0.54, CA 9.6, ALB 3.5,   Impression: hypoNa, cardiorenal, SIADH ?      HEME:   *admit(  WBC 8.3, HB 11, PTL 72, ,  GLOB 4.9   Impression: high globulin, anemia 11 (NN, normal RDW)  --> SPEP if persists     ENDO:   *admit(    Impression: DM2, DLD, VIT D replacement  --> metformin Januvia 100mg (gliptin) , ACTOS,  ASA, Arb      DERM/BREAST:   *admit(    Impression: pressure, redness on buttocks - > wound care      ID:   *admit(  allergic, CIPRO /SULFA

## 2021-03-05 NOTE — THERAPY
Physical Therapy   Initial Evaluation     Patient Name: Yoon Richards  Age:  89 y.o., Sex:  female  Medical Record #: [id number]  Today's Date: 3/5/2021     Precautions: Fall Risk    Assessment  Patient is 89 y.o. female admitted for weakness with hx of CAD status post CABG, hyperlipidemia, hypertension, diabetes, stroke with a diagnosis of acute exacerbation of CHF.  Family has been providing 24/7 SPV for patient following hospitalization last month but are looking for additional caregiver support.  Patient demonstrates decreased functional mobility due to weakness and impaired activity tolerance.  Patient able to ambulate x ~80ft using FWW @ Min/CGA with no gross loss of balance noted.  O2 sats remained >90% throughout ambulation while on room air.  Patient will benefit from skilled acute physical therapy services in order to address functional deficits and improve independence.  Plan    Recommend Physical Therapy 3 times per week until therapy goals are met for the following treatments:  Bed Mobility, Gait Training, Stair Training, Therapeutic Activities and Therapeutic Exercises    DC Equipment Recommendations: None  Discharge Recommendations: Recommend home health for continued physical therapy services        Objective       03/05/21 1132   Prior Living Situation   Prior Services Intermittent Physical Support for ADL Per Family   Housing / Facility 1 Story House   Steps Into Home 2   Steps In Home 0   Rail None   Equipment Owned 4-Wheel Walker;Front-Wheel Walker;Other (Comments)  (3 wheel walker)   Lives with - Patient's Self Care Capacity Adult Children  (Son and DIL providing 24/7 SPV at this time)   Prior Level of Functional Mobility   Bed Mobility Independent   Transfer Status Required Assist   Ambulation Required Assist   Distance Ambulation (Feet)   (Household distances)   Assistive Devices Used   (3WW)   Stairs Required Assist   Comments Prior to hospitalization a few weeks ago at [facility] patient was  very active and independent with mobility using walker per family report.    Gait Analysis   Gait Level Of Assist Minimal Assist   Assistive Device Front Wheel Walker   Distance (Feet) 80   # of Times Distance was Traveled 1   Deviation Bradykinetic;Shuffled Gait   # of Stairs Climbed 0   Weight Bearing Status FWB   Comments O2 sats remained > 90% throughout ambulation on room air   Bed Mobility    Supine to Sit   (Up in bedside chair prior to therapy)   Sit to Supine   (Returned to bedside chair following therapy)   Scooting Supervised   Functional Mobility   Sit to Stand Minimal Assist   Bed, Chair, Wheelchair Transfer Minimal Assist   Transfer Method Stand Step   Short Term Goals    Short Term Goal # 1 Patient will be able to perform bed mobility @ SPV level in order to improve functional independence in 6 visits   Short Term Goal # 2 Patient will be able to perform sit <-> stand using LRAD @ SPV in 6 visits   Short Term Goal # 3 Patient will be able to ambulate x 100ft using walker @ SPV in order to access home safely in 6 visits   Short Term Goal # 4 Patient will be able to ascend/descend 2 stairs @ Min A in order to enter/exit home safely in 6 visits   Anticipated Discharge Equipment and Recommendations   DC Equipment Recommendations None

## 2021-03-05 NOTE — THERAPY
Occupational Therapy   Initial Evaluation     Patient Name: Yoon Richards  Age:  89 y.o., Sex:  female  Medical Record #: [id number]  Today's Date: 3/5/2021     Precautions  Precautions: Fall Risk    Assessment  Patient is 89 y.o. female with a diagnosis of acute CHF exacerbation, recently d/c from hospital and has moved to [place] to be close to her son and dtr. Pt was able to perform basic self care tasks, functional mobility and t/f's with min a level using FWW. Pt is currently limited by generalized weakness and decreased activity tolerance to safely and independently complete ADLs at this time. Pt will benefit from acute skilled OT services while in house and family can provide assistance with ADls and IADLs upon d/c then pt is okay to be discharged home with HHS otherwise post acute placement recommended.     Plan    Recommend Occupational Therapy 3 times per week until therapy goals are met for the following treatments:  Cognitive Skill Development, Self Care/Activities of Daily Living, Therapeutic Activities and Therapeutic Exercises.    DC Equipment Recommendations: Unable to determine at this time  Discharge Recommendations: Recommend home health for continued occupational therapy services(if family can provide 24/7)        Objective       03/05/21 1012   Prior Living Situation   Prior Services Intermittent Physical Support for ADL Per Family   Housing / Facility 1 Story House   Steps Into Home 2   Steps In Home 0   Bathroom Set up Bathtub / Shower Combination   Equipment Owned 4-Wheel Walker;Front-Wheel Walker   Lives with - Patient's Self Care Capacity Adult Children  (Son and DIL providing 24/7 spv this time )   Comments Pt reports she's I with ADLs and dtr helps out. Per EMR, pt recently moved to [place] so that her son and dtr can provide assist, and family is looking into getting caregiver through [facility]   Prior Level of ADL Function   Self Feeding Independent   Grooming / Hygiene Independent   Bathing  Independent   Dressing Independent   Toileting Independent   Prior Level of IADL Function   Medication Management Requires Assist   Laundry Requires Assist   Kitchen Mobility Requires Assist   Finances Requires Assist   Home Management Requires Assist   Shopping Requires Assist   Prior Level Of Mobility Independent With Device in Home   Driving / Transportation Relatives / Others Provide Transportation   Cognition    Cognition / Consciousness X   Orientation Level Not Oriented to Month;Not Oriented to Day;Not Oriented to Reason;Not Oriented to Year   Level of Consciousness Alert   Safety Awareness Impaired   Attention Impaired   Comments following 1-step directions, some safety and memory deficits noted   ADL Assessment   Eating Modified Independent   Grooming Supervision;Standing   Bathing   (discussed home s/u)   Upper Body Dressing Supervision   Lower Body Dressing Minimal Assist   Toileting Minimal Assist   Functional Mobility   Sit to Stand Minimal Assist   Bed, Chair, Wheelchair Transfer Minimal Assist   Toilet Transfers Minimal Assist   Comments w/FWW   Short Term Goals   Short Term Goal # 1 Pt will perform LB dressing with superivsion   Short Term Goal # 2 Pt will perform functional t/f's with supervision   Short Term Goal # 3 Pt will tolerate oob ADL session x 20mins without overt c/o fatigue   Anticipated Discharge Equipment and Recommendations   DC Equipment Recommendations Unable to determine at this time

## 2021-03-05 NOTE — DISCHARGE PLANNING
ATTN: Case Management  RE: Referral for Home Health    Due to our scheduling capacity this patient may not be opened until 3/9/2021.  If this is not safe please send to a different agency.    As of 03/05/2021, we have accepted the Home Health referral for the patient listed above.    A [facility] clinician will be out to see the patient within 48 hours. If you have any questions or concerns regarding the patient's transition to Home Health, please do not hesitate to contact us at [phone].      We look forward to collaborating with you,  [facility] Team

## 2021-03-05 NOTE — DISCHARGE PLANNING
Good afternoon and thank you for alerting [facility] to this patient,  Please place Home Health referral order in the chart so that we may process.

## 2021-03-05 NOTE — DISCHARGE PLANNING
Good afternoon,  This referral has been escalated to a Clinical Supervisor for review in order to determine Home Health appropriateness.  This issue will be resolved as quickly as possible, but for any questions feel free to call us at [phone].  Thank you!

## 2021-03-05 NOTE — PROGRESS NOTES
2 RN skin check complete.   Devices in place Nasal cannula.  Skin assessed under devices yes.  Confirmed pressure ulcers found on NA.  New potential pressure ulcers noted on Buttocks. Wound consult placed yes.  The following interventions in place silicone tubing, pt encouraged to turn self side to side.     Ears red, slow to debra  Redness on sacrum and top of buttocks BL slow to debra  Feet dry and calloused BL.

## 2021-03-05 NOTE — ASSESSMENT & PLAN NOTE
Patient has a history of CAD status post CABG, hyperlipidemia, hypertension, diabetes, stroke.Patient recently discharged from outside facility in [place] about a month ago, before she moved to [place].  She was diagnosed with new onset heart failure.  Echo result brought by daughter shows EF 30 to 35% with hypokinesis of apex anterior wall inferior wall inferior septal and mild inferolateral.  Moderate concentric left ventricle hypertrophy seen.  Grade 3 diastolic dysfunction with elevated left ventricular filling pressures.     Plan:  Resume coreg at home dose, per daughter patient was taking it regularly   Resume apixaban for at age adjusted dose for paroxysmal A. Fib  Increase lasix to 20 mg iv BID and monitor renal function, electrolytes   Compression hose for lower extremity edema   Continue aldactone and losartan   Daily weights, strict I&O's, cardiac diet, sodium restriction 2g and fluid restriction 1500 cc   Possible discharge tomorrow home with family and home health

## 2021-03-05 NOTE — ED NOTES
Report given to Rohit SKINNER RN. Upon shift change pt is resting in bed playing on iPad with daughter at bedside. No distress is noted at this time and pt's breaths are even and unlabored.

## 2021-03-05 NOTE — ED TRIAGE NOTES
Chief Complaint   Patient presents with   • Weakness     per pt's daughter she has had generalized weakness x2-3 days     Pt brought in from home by EMS for above complaint. Pt's daughter states that pt hasn't been walking around as much at home like she normally does and appears weaker overall. Pt was being seen for the first time by [facility] at home and they recommended she come to the ER. Pt had urinalysis done with [facility], results in pt's paper chart. Pt has dementia and is currently at her baseline, per daughter. Pt denies any complaints at this time.

## 2021-03-05 NOTE — PROGRESS NOTES
Bedside report received in ED. Pt brought up to floor and placed on monitor, monitor room notified. SR 80's.  POC discussed with patient. Patient oriented to room.  Patient verbalized understanding.  Call light and belongings with in reach.  Bed locked and in lowest position, alarm and fall precautions in place.  All needs are met at this time.

## 2021-03-05 NOTE — H&P
Hospital Medicine History & Physical Note    Date of Service  3/4/2021    Primary Care Physician  Deepti Uribe M.D.    Consultants  None    Code Status  Full Code    Chief Complaint  Chief Complaint   Patient presents with   • Weakness     per pt's daughter she has had generalized weakness x2-3 days       History of Presenting Illness  89 y.o. female who presented 3/4/2021 with generalized weakness for last 2 days.  Patient has a history of dementia at baseline and is unable to provide a reliable history, thus history provided by daughter at bedside.    Patient has a history of CAD status post CABG, hyperlipidemia, hypertension, diabetes, stroke.    Patient recently discharged from outside facility in [place] about a month ago, before she moved to [place].  She was diagnosed with new onset heart failure.  Echo result brought by daughter shows EF 30 to 35% with hypokinesis of apex anterior wall inferior wall inferior septal and mild inferolateral.  Moderate concentric left ventricle hypertrophy seen.  Grade 3 diastolic dysfunction with elevated left ventricular filling pressures.  Patient was diagnosed with what sounds like paroxysmal A. fib in hospital, however was not given anticoagulation due to fall risk.      Review of Systems  ROS  Unable to obtain due to baseline dementia    Past Medical History   has a past medical history of CAD (coronary artery disease), Chronic systolic heart failure (HCC), Dementia (HCC), Diabetes (HCC), High cholesterol, and Hypertension.    Surgical History   has a past surgical history that includes coronary artery bypas, 4 and cholecystectomy.     Family History  No pertinent family history    Social History   reports that she has quit smoking. She has never used smokeless tobacco. She reports that she does not drink alcohol and does not use drugs.    Allergies  Allergies   Allergen Reactions   • Ciprofloxacin Hcl    • Donepezil    • Hydrochlorothiazide    • Sulfa Drugs         Medications  Prior to Admission Medications   Prescriptions Last Dose Informant Patient Reported? Taking?   CALCIUM CARBONATE-VITAMIN D PO   Yes No   Sig: Take  by mouth.   HYDROcodone-acetaminophen (NORCO) 5-325 MG Tab per tablet   Yes No   Sig: Take 1 tablet by mouth every 6 hours as needed.   Iron-Vit C-Vit B12-Folic Acid (IRON 100 PLUS PO)   Yes No   Sig: Take  by mouth.   METFORMIN HCL PO   Yes No   Sig: Take 1,000 mg by mouth 2 Times a Day.   SITagliptin (JANUVIA) 100 MG Tab   Yes No   Sig: Take 100 mg by mouth every day.   amLODIPine (NORVASC) 5 MG Tab   Yes No   Sig: Take 5 mg by mouth every day.   apixaban (ELIQUIS) 5mg Tab   Yes No   Sig: Take 5 mg by mouth 2 Times a Day.   aspirin EC (ECOTRIN) 81 MG Tablet Delayed Response   Yes No   Sig: Take 81 mg by mouth every day.   atorvastatin (LIPITOR) 20 MG Tab   Yes No   Sig: Take 20 mg by mouth every evening.   carvedilol (COREG) 12.5 MG Tab   Yes No   Sig: Take 12.5 mg by mouth 2 times a day, with meals.   furosemide (LASIX) 20 MG Tab   Yes No   Sig: Take 20 mg by mouth every day.   losartan (COZAAR) 100 MG Tab   Yes No   Sig: Take 100 mg by mouth every day.   pioglitazone (ACTOS) 30 MG Tab   Yes No   Sig: Take 30 mg by mouth every day.   spironolactone (ALDACTONE) 25 MG Tab   Yes No   Sig: Take 12.5 mg by mouth every day.   vitamin D (CHOLECALCIFEROL) 1000 Unit (25 mcg) Tab   Yes No   Sig: Take 1,000 Units by mouth every day.      Facility-Administered Medications: None       Physical Exam  Temp:  [36.8 °C (98.3 °F)] 36.8 °C (98.3 °F)  Pulse:  [77-84] 84  Resp:  [16-31] 25  BP: (125-157)/(58-73) 157/73  SpO2:  [98 %-100 %] 100 %    Physical Exam  Constitutional:       Appearance: Normal appearance.   HENT:      Head: Normocephalic.      Nose: Nose normal.      Mouth/Throat:      Mouth: Mucous membranes are moist.   Eyes:      Pupils: Pupils are equal, round, and reactive to light.   Cardiovascular:      Rate and Rhythm: Normal rate and regular rhythm.       Pulses: Normal pulses.   Pulmonary:      Effort: Pulmonary effort is normal.      Comments: Rales heard bilaterally at lung bases  Nasal cannula in place, saturating 98% on nasal cannula  Abdominal:      General: Abdomen is flat. Bowel sounds are normal.      Palpations: Abdomen is soft.   Musculoskeletal:         General: Normal range of motion.      Cervical back: Normal range of motion.      Comments: Minimal swelling with nonpitting edema on lower extremities   Skin:     General: Skin is warm.   Neurological:      General: No focal deficit present.      Mental Status: She is alert and oriented to person, place, and time. Mental status is at baseline.      Comments: Able to verbally respond and follow commands           Laboratory:  Recent Labs     03/04/21  1744   WBC 8.3   RBC 3.86*   HEMOGLOBIN 10.7*   HEMATOCRIT 33.7*   MCV 87.3   MCH 27.7   MCHC 31.8*   RDW 47.7   PLATELETCT 72*   MPV 12.2     Recent Labs     03/04/21  1744   SODIUM 125*   POTASSIUM 4.6   CHLORIDE 91*   CO2 23   GLUCOSE 149*   BUN 18   CREATININE 0.54   CALCIUM 9.6     Recent Labs     03/04/21  1744   ALTSGPT 8   ASTSGOT 17   ALKPHOSPHAT 137*   TBILIRUBIN 0.3   GLUCOSE 149*         Recent Labs     03/04/21  1744   NTPROBNP 2499*         Recent Labs     03/04/21  1744   TROPONINT 36*       Imaging:  DX-CHEST-PORTABLE (1 VIEW)   Final Result      1.  Patchy bilateral pulmonary infiltrates. Consideration should be given for atypical infection or interstitial edema.      2.  Cardiomegaly.            Assessment/Plan:  I anticipate this patient is appropriate for observation status at this time.    * Acute exacerbation of CHF (congestive heart failure) (HCC)  Assessment & Plan  Admit patient to telemetry  Continue Lasix 20 mg p.o. daily  Troponin 36, trend troponin  Physical therapy Occupational Therapy for generalized weakness    Hyperlipidemia  Assessment & Plan  Continue Lipitor 20 mg every night    Hypertension  Assessment &  Plan  Continue home dose of Aldactone 12.5 milligrams p.o. daily and losartan 100 mg p.o. daily  Hold Norvasc and Coreg for now, can restart as needed    DM (diabetes mellitus) (HCC)  Assessment & Plan  Sliding scale as needed

## 2021-03-05 NOTE — CARE PLAN
Problem: Communication  Goal: The ability to communicate needs accurately and effectively will improve  Outcome: PROGRESSING AS EXPECTED     Problem: Safety  Goal: Will remain free from injury  Outcome: PROGRESSING AS EXPECTED     Problem: Venous Thromboembolism (VTW)/Deep Vein Thrombosis (DVT) Prevention:  Goal: Patient will participate in Venous Thrombosis (VTE)/Deep Vein Thrombosis (DVT)Prevention Measures  Outcome: PROGRESSING AS EXPECTED     Problem: Bowel/Gastric:  Goal: Normal bowel function is maintained or improved  Outcome: PROGRESSING AS EXPECTED     Problem: Discharge Barriers/Planning  Goal: Patient's continuum of care needs will be met  Outcome: PROGRESSING AS EXPECTED

## 2021-03-05 NOTE — ED PROVIDER NOTES
ED Provider Note    CHIEF COMPLAINT  Chief Complaint   Patient presents with   • Weakness     per pt's daughter she has had generalized weakness x2-3 days       HPI  Yoon Richards is a 89 y.o. female who presents in the care of her daughter who, has been experiencing generalized weakness for the past 2 to 3 days.  Her daughter states her activity, getting out of bed have all been very difficult.  She is eating less.  Patient recently moved to live with her daughter after hospitalization in [place].  During that hospitalization was found to have congestive heart failure.  She was discharged from the hospital on nasal cannula oxygen and moved to [place].  During that visit, according to notes brought by the daughter, there was CT scan of the abdomen pelvis described as negative, echocardiogram, chest x-ray which showed congestive heart failure.  Patient currently denies chest pain, denies difficulty breathing however has advanced dementia and per the daughter would be unable to answer those types of questions accurately.  The daughter has not noticed fever or cough.    REVIEW OF SYSTEMS, answers per the daughter is the patient unable to answer questions    Constitutional: No history of fever  Respiratory: No cough.  Oxygen requirement, recent diagnosis of congestive heart failure  Cardiac: No complaints of chest pain or syncope  Gastrointestinal: No vomiting or diarrhea  Musculoskeletal: No acute hip pain or injury  Neurologic: Dementia.  Denies headache  Endocrine: History of diabetes       All other systems are negative.       PAST MEDICAL HISTORY  Past Medical History:   Diagnosis Date   • CAD (coronary artery disease)    • Chronic systolic heart failure (HCC)    • Dementia (HCC)    • Diabetes (HCC)    • High cholesterol    • Hypertension        FAMILY HISTORY  History reviewed. No pertinent family history.    SOCIAL HISTORY  Social History     Socioeconomic History   • Marital status:      Spouse name:  "Not on file   • Number of children: Not on file   • Years of education: Not on file   • Highest education level: Not on file   Occupational History   • Not on file   Tobacco Use   • Smoking status: Former Smoker   • Smokeless tobacco: Never Used   Substance and Sexual Activity   • Alcohol use: Never   • Drug use: Never   • Sexual activity: Not Currently   Other Topics Concern   • Not on file   Social History Narrative   • Not on file     Social Determinants of Health     Financial Resource Strain:    • Difficulty of Paying Living Expenses:    Food Insecurity:    • Worried About Running Out of Food in the Last Year:    • Ran Out of Food in the Last Year:    Transportation Needs:    • Lack of Transportation (Medical):    • Lack of Transportation (Non-Medical):    Physical Activity:    • Days of Exercise per Week:    • Minutes of Exercise per Session:    Stress:    • Feeling of Stress :    Social Connections:    • Frequency of Communication with Friends and Family:    • Frequency of Social Gatherings with Friends and Family:    • Attends [employer] Services:    • Active Member of Clubs or Organizations:    • Attends Club or Organization Meetings:    • Marital Status:    Intimate Partner Violence:    • Fear of Current or Ex-Partner:    • Emotionally Abused:    • Physically Abused:    • Sexually Abused:        SURGICAL HISTORY  Past Surgical History:   Procedure Laterality Date   • CHOLECYSTECTOMY     • CORONARY ARTERY BYPAS, 4         CURRENT MEDICATIONS  Home Medications    **Home medications have not yet been reviewed for this encounter**         ALLERGIES  Allergies   Allergen Reactions   • Ciprofloxacin Hcl    • Donepezil    • Hydrochlorothiazide    • Sulfa Drugs        PHYSICAL EXAM  VITAL SIGNS: /64   Pulse 77   Temp 36.8 °C (98.3 °F) (Temporal)   Resp 16   Ht 1.6 m (5' 3\")   Wt 58.2 kg (128 lb 6.4 oz)   SpO2 99%   BMI 22.75 kg/m²   Constitutional:  Non-toxic appearance.  No distress  HENT: No facial " swelling, no epistaxis  Eyes: Anicteric, no conjunctivitis.     Cardiovascular: Normal heart rate, Normal rhythm  Pulmonary: Lateral basilar crackles.  Upper lung fields clear, no wheezing.   Gastrointestinal: Soft, No tenderness, No distention or mass  Skin: Warm, Dry, No cyanosis.   Neurologic: Speech is clear, follows commands, facial expression is symmetrical.  Strength equal.  Moves all extremities  Psychiatric: Affect flat,  Mood normal.  Patient is calm and cooperative  Musculoskeletal: No chest wall or neck tenderness    EKG/Labs  Results for orders placed or performed during the hospital encounter of 03/04/21   CBC WITH DIFFERENTIAL   Result Value Ref Range    WBC 8.3 4.8 - 10.8 K/uL    RBC 3.86 (L) 4.20 - 5.40 M/uL    Hemoglobin 10.7 (L) 12.0 - 16.0 g/dL    Hematocrit 33.7 (L) 37.0 - 47.0 %    MCV 87.3 81.4 - 97.8 fL    MCH 27.7 27.0 - 33.0 pg    MCHC 31.8 (L) 33.6 - 35.0 g/dL    RDW 47.7 35.9 - 50.0 fL    Platelet Count 72 (L) 164 - 446 K/uL    MPV 12.2 9.0 - 12.9 fL    Neutrophils-Polys 86.30 (H) 44.00 - 72.00 %    Lymphocytes 4.90 (L) 22.00 - 41.00 %    Monocytes 7.60 0.00 - 13.40 %    Eosinophils 0.40 0.00 - 6.90 %    Basophils 0.20 0.00 - 1.80 %    Immature Granulocytes 0.60 0.00 - 0.90 %    Nucleated RBC 0.00 /100 WBC    Neutrophils (Absolute) 7.18 (H) 2.00 - 7.15 K/uL    Lymphs (Absolute) 0.41 (L) 1.00 - 4.80 K/uL    Monos (Absolute) 0.63 0.00 - 0.85 K/uL    Eos (Absolute) 0.03 0.00 - 0.51 K/uL    Baso (Absolute) 0.02 0.00 - 0.12 K/uL    Immature Granulocytes (abs) 0.05 0.00 - 0.11 K/uL    NRBC (Absolute) 0.00 K/uL   COMP METABOLIC PANEL   Result Value Ref Range    Sodium 125 (L) 135 - 145 mmol/L    Potassium 4.6 3.6 - 5.5 mmol/L    Chloride 91 (L) 96 - 112 mmol/L    Co2 23 20 - 33 mmol/L    Anion Gap 11.0 7.0 - 16.0    Glucose 149 (H) 65 - 99 mg/dL    Bun 18 8 - 22 mg/dL    Creatinine 0.54 0.50 - 1.40 mg/dL    Calcium 9.6 8.5 - 10.5 mg/dL    AST(SGOT) 17 12 - 45 U/L    ALT(SGPT) 8 2 - 50 U/L     Alkaline Phosphatase 137 (H) 30 - 99 U/L    Total Bilirubin 0.3 0.1 - 1.5 mg/dL    Albumin 3.5 3.2 - 4.9 g/dL    Total Protein 8.4 (H) 6.0 - 8.2 g/dL    Globulin 4.9 (H) 1.9 - 3.5 g/dL    A-G Ratio 0.7 g/dL   TROPONIN   Result Value Ref Range    Troponin T 36 (H) 6 - 19 ng/L   proBrain Natriuretic Peptide, NT   Result Value Ref Range    NT-proBNP 2499 (H) 0 - 125 pg/mL   Blood Culture,Hold   Result Value Ref Range    Blood Culture Hold Collected    ESTIMATED GFR   Result Value Ref Range    GFR If African American >60 >60 mL/min/1.73 m 2    GFR If Non African American >60 >60 mL/min/1.73 m 2   EKG (NOW)   Result Value Ref Range    Report       [facility] Emergency Dept.    Test Date:  2021  Pt Name:    ERICA ORNELAS              Department: ER  MRN:        [id number]                      Room:       [facility]  Gender:     Female                       Technician: [id number]  :        [id number]                   Requested By:SEVERO MORENO  Order #:    [id number]                    Reading MD: SEVERO MORENO MD    Measurements  Intervals                                Axis  Rate:       78                           P:          55  OH:         204                          QRS:        122  QRSD:       154                          T:          -4  QT:         424  QTc:        484    Interpretive Statements  SINUS RHYTHM  RIGHT BUNDLE BRANCH BLOCK  No previous ECG available for comparison  No acute ischemia  Electronically Signed On 3-4-2021 19:43:35 PST by SEVERO MORENO MD           RADIOLOGY/PROCEDURES  DX-CHEST-PORTABLE (1 VIEW)   Final Result      1.  Patchy bilateral pulmonary infiltrates. Consideration should be given for atypical infection or interstitial edema.      2.  Cardiomegaly.            COURSE & MEDICAL DECISION MAKING  Pertinent Labs & Imaging studies reviewed. (See chart for details)  Patient with evidence of congestive heart failure on exam, lung crackles, she has baseline  hypoxia since [place], still requires supplemental oxygen.  She has mild elevation of her troponin, nondiagnostic at this point, at 36.  She has history of hyponatremia with symptoms of generalized weakness, she is found to be at 125 today.  Patient takes 20 mg of Lasix daily, her daughter was concerned this had to do with why she is hyponatremic.  She states they tightly regulate her water intake at home since the most recent hospitalization.  Patient appears comfortable at this time, plan for hospitalization, ongoing evaluation and treatment.  Additional dose of Lasix held at this time in the emergency department pending further evaluation by the admitting hospitalist.    FINAL IMPRESSION     1. Generalized weakness     2. Hyponatremia     3. Elevated troponin     4. Congestive heart failure, unspecified HF chronicity, unspecified heart failure type (HCC)                     Electronically signed by: Sid Nieto M.D., 3/4/2021 6:36 PM

## 2021-03-06 PROBLEM — E87.1 HYPONATREMIA: Status: ACTIVE | Noted: 2021-03-06

## 2021-03-06 PROBLEM — R77.1 ELEVATED SERUM GLOBULIN LEVEL: Status: ACTIVE | Noted: 2021-03-06

## 2021-03-06 PROBLEM — F03.90 DEMENTIA (HCC): Status: ACTIVE | Noted: 2021-03-06

## 2021-03-06 LAB
ALBUMIN SERPL BCP-MCNC: 3.6 G/DL (ref 3.2–4.9)
ALBUMIN/GLOB SERPL: 0.7 G/DL
ALP SERPL-CCNC: 132 U/L (ref 30–99)
ALT SERPL-CCNC: 10 U/L (ref 2–50)
ANION GAP SERPL CALC-SCNC: 11 MMOL/L (ref 7–16)
ANION GAP SERPL CALC-SCNC: 14 MMOL/L (ref 7–16)
AST SERPL-CCNC: 12 U/L (ref 12–45)
BASOPHILS # BLD AUTO: 0.6 % (ref 0–1.8)
BASOPHILS # BLD: 0.05 K/UL (ref 0–0.12)
BILIRUB SERPL-MCNC: 0.3 MG/DL (ref 0.1–1.5)
BUN SERPL-MCNC: 24 MG/DL (ref 8–22)
BUN SERPL-MCNC: 26 MG/DL (ref 8–22)
CALCIUM SERPL-MCNC: 9.8 MG/DL (ref 8.5–10.5)
CALCIUM SERPL-MCNC: 9.9 MG/DL (ref 8.5–10.5)
CHLORIDE SERPL-SCNC: 87 MMOL/L (ref 96–112)
CHLORIDE SERPL-SCNC: 87 MMOL/L (ref 96–112)
CO2 SERPL-SCNC: 24 MMOL/L (ref 20–33)
CO2 SERPL-SCNC: 25 MMOL/L (ref 20–33)
CREAT SERPL-MCNC: 0.67 MG/DL (ref 0.5–1.4)
CREAT SERPL-MCNC: 0.74 MG/DL (ref 0.5–1.4)
EOSINOPHIL # BLD AUTO: 0.07 K/UL (ref 0–0.51)
EOSINOPHIL NFR BLD: 0.8 % (ref 0–6.9)
ERYTHROCYTE [DISTWIDTH] IN BLOOD BY AUTOMATED COUNT: 47 FL (ref 35.9–50)
GLOBULIN SER CALC-MCNC: 4.9 G/DL (ref 1.9–3.5)
GLUCOSE SERPL-MCNC: 140 MG/DL (ref 65–99)
GLUCOSE SERPL-MCNC: 209 MG/DL (ref 65–99)
HCT VFR BLD AUTO: 34.1 % (ref 37–47)
HGB BLD-MCNC: 11.2 G/DL (ref 12–16)
IMM GRANULOCYTES # BLD AUTO: 0.05 K/UL (ref 0–0.11)
IMM GRANULOCYTES NFR BLD AUTO: 0.6 % (ref 0–0.9)
LYMPHOCYTES # BLD AUTO: 0.91 K/UL (ref 1–4.8)
LYMPHOCYTES NFR BLD: 11 % (ref 22–41)
MAGNESIUM SERPL-MCNC: 1.9 MG/DL (ref 1.5–2.5)
MCH RBC QN AUTO: 28.3 PG (ref 27–33)
MCHC RBC AUTO-ENTMCNC: 32.8 G/DL (ref 33.6–35)
MCV RBC AUTO: 86.1 FL (ref 81.4–97.8)
MONOCYTES # BLD AUTO: 0.78 K/UL (ref 0–0.85)
MONOCYTES NFR BLD AUTO: 9.5 % (ref 0–13.4)
NEUTROPHILS # BLD AUTO: 6.38 K/UL (ref 2–7.15)
NEUTROPHILS NFR BLD: 77.5 % (ref 44–72)
NRBC # BLD AUTO: 0 K/UL
NRBC BLD-RTO: 0 /100 WBC
OSMOLALITY SERPL: 280 MOSM/KG H2O (ref 278–298)
PHOSPHATE SERPL-MCNC: 3 MG/DL (ref 2.5–4.5)
PLATELET # BLD AUTO: 133 K/UL (ref 164–446)
PMV BLD AUTO: 11.3 FL (ref 9–12.9)
POTASSIUM SERPL-SCNC: 4.1 MMOL/L (ref 3.6–5.5)
POTASSIUM SERPL-SCNC: 4.1 MMOL/L (ref 3.6–5.5)
PROT SERPL-MCNC: 8.5 G/DL (ref 6–8.2)
RBC # BLD AUTO: 3.96 M/UL (ref 4.2–5.4)
SODIUM SERPL-SCNC: 123 MMOL/L (ref 135–145)
SODIUM SERPL-SCNC: 125 MMOL/L (ref 135–145)
WBC # BLD AUTO: 8.2 K/UL (ref 4.8–10.8)

## 2021-03-06 PROCEDURE — 84165 PROTEIN E-PHORESIS SERUM: CPT

## 2021-03-06 PROCEDURE — 700111 HCHG RX REV CODE 636 W/ 250 OVERRIDE (IP): Performed by: STUDENT IN AN ORGANIZED HEALTH CARE EDUCATION/TRAINING PROGRAM

## 2021-03-06 PROCEDURE — 700102 HCHG RX REV CODE 250 W/ 637 OVERRIDE(OP): Performed by: STUDENT IN AN ORGANIZED HEALTH CARE EDUCATION/TRAINING PROGRAM

## 2021-03-06 PROCEDURE — 86334 IMMUNOFIX E-PHORESIS SERUM: CPT

## 2021-03-06 PROCEDURE — A9270 NON-COVERED ITEM OR SERVICE: HCPCS | Performed by: STUDENT IN AN ORGANIZED HEALTH CARE EDUCATION/TRAINING PROGRAM

## 2021-03-06 PROCEDURE — 99225 PR SUBSEQUENT OBSERVATION CARE,LEVEL II: CPT | Mod: GC | Performed by: INTERNAL MEDICINE

## 2021-03-06 PROCEDURE — 83735 ASSAY OF MAGNESIUM: CPT

## 2021-03-06 PROCEDURE — 83930 ASSAY OF BLOOD OSMOLALITY: CPT

## 2021-03-06 PROCEDURE — 96366 THER/PROPH/DIAG IV INF ADDON: CPT

## 2021-03-06 PROCEDURE — 84100 ASSAY OF PHOSPHORUS: CPT

## 2021-03-06 PROCEDURE — 85025 COMPLETE CBC W/AUTO DIFF WBC: CPT

## 2021-03-06 PROCEDURE — 80048 BASIC METABOLIC PNL TOTAL CA: CPT

## 2021-03-06 PROCEDURE — 80053 COMPREHEN METABOLIC PANEL: CPT

## 2021-03-06 PROCEDURE — 36415 COLL VENOUS BLD VENIPUNCTURE: CPT

## 2021-03-06 PROCEDURE — 84155 ASSAY OF PROTEIN SERUM: CPT | Mod: XU

## 2021-03-06 PROCEDURE — G0378 HOSPITAL OBSERVATION PER HR: HCPCS

## 2021-03-06 PROCEDURE — 96376 TX/PRO/DX INJ SAME DRUG ADON: CPT

## 2021-03-06 RX ORDER — MAGNESIUM SULFATE HEPTAHYDRATE 40 MG/ML
2 INJECTION, SOLUTION INTRAVENOUS ONCE
Status: COMPLETED | OUTPATIENT
Start: 2021-03-06 | End: 2021-03-06

## 2021-03-06 RX ORDER — SODIUM BICARBONATE 650 MG/1
650 TABLET ORAL 3 TIMES DAILY
Status: DISCONTINUED | OUTPATIENT
Start: 2021-03-06 | End: 2021-03-07 | Stop reason: HOSPADM

## 2021-03-06 RX ADMIN — METFORMIN HYDROCHLORIDE 1000 MG: 500 TABLET ORAL at 17:39

## 2021-03-06 RX ADMIN — SODIUM BICARBONATE 650 MG: 650 TABLET ORAL at 08:39

## 2021-03-06 RX ADMIN — ACETAMINOPHEN 650 MG: 325 TABLET, FILM COATED ORAL at 17:51

## 2021-03-06 RX ADMIN — ACETAMINOPHEN 650 MG: 325 TABLET, FILM COATED ORAL at 03:23

## 2021-03-06 RX ADMIN — ATORVASTATIN CALCIUM 20 MG: 20 TABLET, FILM COATED ORAL at 17:39

## 2021-03-06 RX ADMIN — Medication 1 TABLET: at 05:25

## 2021-03-06 RX ADMIN — CARVEDILOL 12.5 MG: 12.5 TABLET, FILM COATED ORAL at 17:39

## 2021-03-06 RX ADMIN — METFORMIN HYDROCHLORIDE 1000 MG: 500 TABLET ORAL at 08:54

## 2021-03-06 RX ADMIN — APIXABAN 2.5 MG: 2.5 TABLET, FILM COATED ORAL at 17:39

## 2021-03-06 RX ADMIN — CARVEDILOL 12.5 MG: 12.5 TABLET, FILM COATED ORAL at 08:38

## 2021-03-06 RX ADMIN — SODIUM BICARBONATE 650 MG: 650 TABLET ORAL at 13:29

## 2021-03-06 RX ADMIN — Medication 81 MG: at 05:25

## 2021-03-06 RX ADMIN — FUROSEMIDE 20 MG: 10 INJECTION, SOLUTION INTRAMUSCULAR; INTRAVENOUS at 05:25

## 2021-03-06 RX ADMIN — SODIUM BICARBONATE 650 MG: 650 TABLET ORAL at 17:39

## 2021-03-06 RX ADMIN — FUROSEMIDE 20 MG: 10 INJECTION, SOLUTION INTRAMUSCULAR; INTRAVENOUS at 17:47

## 2021-03-06 RX ADMIN — SITAGLIPTIN 100 MG: 100 TABLET, FILM COATED ORAL at 05:26

## 2021-03-06 RX ADMIN — APIXABAN 2.5 MG: 2.5 TABLET, FILM COATED ORAL at 05:25

## 2021-03-06 RX ADMIN — MAGNESIUM SULFATE 2 G: 2 INJECTION INTRAVENOUS at 05:25

## 2021-03-06 RX ADMIN — LOSARTAN POTASSIUM 100 MG: 50 TABLET, FILM COATED ORAL at 05:25

## 2021-03-06 RX ADMIN — SPIRONOLACTONE 12.5 MG: 25 TABLET ORAL at 05:26

## 2021-03-06 RX ADMIN — Medication 1000 UNITS: at 05:26

## 2021-03-06 ASSESSMENT — CHA2DS2 SCORE
CHA2DS2 VASC SCORE: 9
HYPERTENSION: YES
SEX: FEMALE
AGE 65 TO 74: NO
VASCULAR DISEASE: YES
AGE 75 OR GREATER: YES
PRIOR STROKE OR TIA OR THROMBOEMBOLISM: YES
DIABETES: YES
CHF OR LEFT VENTRICULAR DYSFUNCTION: YES

## 2021-03-06 ASSESSMENT — PAIN DESCRIPTION - PAIN TYPE: TYPE: ACUTE PAIN

## 2021-03-06 ASSESSMENT — FIBROSIS 4 INDEX: FIB4 SCORE: 2.65

## 2021-03-06 NOTE — PROGRESS NOTES
Assumed care at 0700, bedside report received from Kaykay DURAN. Pt. Is SR on the monitor. Initial assessment completed, orders reviewed, call light within reach, bed alarm is in use, and hourly rounding in place. POC addressed with patient, no additional questions at this time.

## 2021-03-06 NOTE — PROGRESS NOTES
Monitor Summary  Rhythm: SR  Rate: 68-96  Ectopy: R PAC, O PVC, R blocked PAC, first degree HB, BBB  .22 / .16 / .40

## 2021-03-06 NOTE — PROGRESS NOTES
Assumed care of patient at bedside report from DAY RN. Updated on POC. Patient currently A & O x 3; on 0 L O2; up with one assist; without complaints of acute pain. Call light within reach. Whiteboard updated. Fall precautions in place. Bed locked and in lowest position. All questions answered. No other needs indicated at this time.

## 2021-03-06 NOTE — DISCHARGE SUMMARY
Discharge Summary    Date of Admission: 3/4/2021  Date of Discharge: 3/7/21  Discharging Attending: Adrián Palomino M.D.   Discharging Senior Resident: Dr. Sargent    CHIEF COMPLAINT ON ADMISSION  Chief Complaint   Patient presents with   • Weakness     per pt's daughter she has had generalized weakness x2-3 days       Reason for Admission  Generalized weakness, shortness of breath     Admission Date  3/4/2021    CODE STATUS  Full Code    HPI & HOSPITAL COURSE  89 y.o. female who presented with generalized weakness for last 2 days.  Patient has a history of dementia at baseline and is unable to provide a reliable history, thus history provided by daughter at bedside. Patient has a history of CAD status post CABG, hyperlipidemia, hypertension, diabetes, stroke. Patient recently discharged from outside facility in [place], before she moved to [place] with her daughter.  She was diagnosed with new onset heart failure.  Echo result brought by daughter showed EF 30 to 35% with hypokinesis of apex anterior wall inferior wall inferior septal and mild inferolateral.  Moderate concentric left ventricle hypertrophy seen.  Grade 3 diastolic dysfunction with elevated left ventricular filling pressures.  Patient was diagnosed with what sounds like paroxysmal A. fib in hospital and put on Eliquis. She was admitted here for CHF exacerbation and treated with iv lasix, BB, ACE, spironolactone, cardiac diet, fluid, salt restriction, and compression hose. Her volume status improved. She does have hyponatremia, which is likely related to volume overload and will need to be followed up by PCP on discharge. She has an elevated protein on CMP and a monoclonal protein study was ordered and results need to be followed up outpatient. Patient able to ambulate with minimal assist using her walker and lives with her daughter. She will be discharged home to her daughter with home health. Discussed with daughter the risks of bleeding and benefits  of stroke prevention with Eliquis and she would like to continue on the treatment. Educated daughter on using lasix prn when patient appears to be rapidly gaining weight or becoming edematous and salt and fluid restriction. Recommend she sees her PCP for f/u next week and has a repeat BMP to check her sodium.     Therefore, she is discharged in fair and stable condition to home with organized home healthcare and close outpatient follow-up.    The patient met 2-midnight criteria for an inpatient stay at the time of discharge.    PHYSICAL EXAM ON DISCHARGE  Temp:  [36.1 °C (96.9 °F)-37.3 °C (99.1 °F)] 36.1 °C (96.9 °F)  Pulse:  [] 70  Resp:  [16] 16  BP: (112-152)/(49-64) 152/64  SpO2:  [97 %-100 %] 98 %    Physical Exam   Constitutional:       Comments: Thin    HENT:      Head: Normocephalic and atraumatic.      Nose: Nose normal.      Mouth/Throat:      Mouth: Mucous membranes are moist.   Eyes:      Extraocular Movements: Extraocular movements intact.      Pupils: Pupils are equal, round, and reactive to light.   Cardiovascular:      Rate and Rhythm: Normal rate and regular rhythm.      Pulses: Normal pulses.      Heart sounds: No murmur.      Comments: +JVD  Pulmonary:      Effort: Pulmonary effort is normal. No respiratory distress.      Breath sounds: No wheezing, rhonchi or rales.   Abdominal:      General: Bowel sounds are normal. There is no distension.      Palpations: Abdomen is soft.      Tenderness: There is no abdominal tenderness. There is no guarding or rebound.   Musculoskeletal:         General: Normal range of motion.      Cervical back: Normal range of motion and neck supple.      Right lower leg: Edema (trace) present.      Left lower leg: Edema (trace) present.   Skin:     General: Skin is warm.      Findings: No lesion or rash.   Neurological:      General: No focal deficit present.      Mental Status: She is alert. She is disoriented.      Cranial Nerves: No cranial nerve deficit.       Motor: Weakness present.   Psychiatric:         Mood and Affect: Mood normal.         Behavior: Behavior normal.      Comments: Dementia, alert to self      Discharge Date  3/7/21    FOLLOW UP ITEMS POST DISCHARGE  PCP    DISCHARGE DIAGNOSES  Principal Problem:    Acute exacerbation of CHF (congestive heart failure) ([facility]) POA: Unknown  Active Problems:    Paroxysmal A-fib ([facility]) POA: Yes    DM (diabetes mellitus) ([facility]) POA: Unknown    Hypertension POA: Unknown    Hyperlipidemia POA: Unknown    FOLLOW UP  Future Appointments   Date Time Provider Department Center   3/8/2021 10:45 AM Uvaldo Ferrer M.D. [facility] None   4/6/2021  9:30 AM Deepti Uribe M.D. [facility]     No follow-up provider specified.    MEDICATIONS ON DISCHARGE     Medication List      START taking these medications      Instructions   apixaban 2.5mg Tabs  Commonly known as: ELIQUIS   Take 1 tablet by mouth 2 Times a Day for 30 days. Indications: Thromboembolism secondary to Atrial Fibrillation  Dose: 2.5 mg     oyster shell calcium/vitamin D 250-125 MG-UNIT Tabs tablet  Start taking on: March 8, 2021  Replaces: CALCIUM CARBONATE-VITAMIN D PO   Take 1 tablet by mouth every day for 30 days.  Dose: 1 tablet        CHANGE how you take these medications      Instructions   atorvastatin 20 MG Tabs  What changed: when to take this  Commonly known as: LIPITOR   Take 1 tablet by mouth every evening for 30 days.  Dose: 20 mg     metformin 1000 MG tablet  What changed:   · medication strength  · when to take this  Commonly known as: GLUCOPHAGE   Take 1 tablet by mouth 2 times a day, with meals for 30 days.  Dose: 1,000 mg        CONTINUE taking these medications      Instructions   carvedilol 12.5 MG Tabs  Commonly known as: COREG   Take 1 tablet by mouth 2 times a day, with meals for 30 days.  Dose: 12.5 mg     furosemide 20 MG Tabs  Commonly known as: LASIX   Take 20 mg by mouth every day.  Dose: 20 mg     IRON 100 PLUS PO   Take  by mouth.      losartan 100 MG Tabs  Start taking on: March 8, 2021  Commonly known as: COZAAR   Take 1 tablet by mouth every day for 30 days.  Dose: 100 mg     SITagliptin 100 MG Tabs  Commonly known as: JANUVIA   Take 1 tablet by mouth every day for 30 days.  Dose: 100 mg     spironolactone 25 MG Tabs  Start taking on: March 8, 2021  Commonly known as: ALDACTONE   Take 0.5 Tablets by mouth every day for 30 days.  Dose: 12.5 mg     thiamine 100 MG Tabs  Commonly known as: Vitamin B-1   Take 100 mg by mouth every day.  Dose: 100 mg     vitamin D 1000 UNIT Tabs  Start taking on: March 8, 2021  Commonly known as: VITAMIND D3   Take 1 tablet by mouth every day for 30 days.  Dose: 1,000 Units        STOP taking these medications    amLODIPine 5 MG Tabs  Commonly known as: NORVASC     aspirin EC 81 MG Tbec  Commonly known as: ECOTRIN     CALCIUM CARBONATE-VITAMIN D PO  Replaced by: oyster shell calcium/vitamin D 250-125 MG-UNIT Tabs tablet     pioglitazone 30 MG Tabs  Commonly known as: ACTOS            Allergies  Allergies   Allergen Reactions   • Ciprofloxacin Hcl    • Donepezil    • Hydrochlorothiazide    • Sulfa Drugs        DIET  Orders Placed This Encounter   Procedures   • Diet Order Diet: 2 Gram Sodium; Fluid modifications: (optional): 1500 ml Fluid Restriction     Standing Status:   Standing     Number of Occurrences:   1     Order Specific Question:   Diet:     Answer:   2 Gram Sodium [7]     Order Specific Question:   Fluid modifications: (optional)     Answer:   1500 ml Fluid Restriction [9]       ACTIVITY  As tolerated.  Weight bearing as tolerated    CONSULTATIONS  N/A    PROCEDURES  N/A    Subjective:  Patient denies CP and SOB. CTAB. Helped her ambulate to the bathroom with her walker. On 1 L NC sating well. Leg edema mild.     Time spent on discharge 30 minutes

## 2021-03-06 NOTE — CARE PLAN
Problem: Safety  Goal: Will remain free from injury  Outcome: PROGRESSING AS EXPECTED  Note: Fall precautions in place. Bed in lowest position. Non-skid socks in place. Personal possessions within reach. Mobility sign on door. Bed-alarm on. Call light within reach. Pt educated regarding fall prevention and states understanding.        Problem: Knowledge Deficit  Goal: Knowledge of disease process/condition, treatment plan, diagnostic tests, and medications will improve  Outcome: PROGRESSING SLOWER THAN EXPECTED  Note: Pt educated regarding plan of care and medications. All questions answered.   Needs reinforcement.

## 2021-03-06 NOTE — PROGRESS NOTES
"Daily Progress Note:     Date of Service: 3/5/2021  Primary Team: [facility] Red Team    Attending: Adrián Palomino M.D.   Senior Resident: Dr. Sargent  Contact:  [phone]    Chief Complaint:   Weakness        per pt's daughter she has had generalized weakness x2-3 days      ID: \"89 y.o. female who presented 3/4/2021 with generalized weakness for last 2 days.  Patient has a history of dementia at baseline and is unable to provide a reliable history, thus history provided by daughter at bedside.Patient has a history of CAD status post CABG, hyperlipidemia, hypertension, diabetes, stroke.Patient recently discharged from outside facility in [place] about a month ago, before she moved to [place].  She was diagnosed with new onset heart failure.  Echo result brought by daughter shows EF 30 to 35% with hypokinesis of apex anterior wall inferior wall inferior septal and mild inferolateral.  Moderate concentric left ventricle hypertrophy seen.  Grade 3 diastolic dysfunction with elevated left ventricular filling pressures.  Patient was diagnosed with what sounds like paroxysmal A. fib in hospital\"     Interval Update:   Resting in bed this morning, no acute distress. Alert to self. On RA. Mild peripheral edema, no crackles, but diminished at the bases. 1 liter urine overnight.     Plan:  Resume coreg at home dose, per daughter patient was taking it regularly   Resume apixaban at age adjusted dose for paroxysmal A. Fib  Increase lasix to 20 mg iv BID and monitor renal function, electrolytes   Compression hose for lower extremity edema   Resume home metformin and Januvia   Continue aldactone and losartan   Daily weights, strict I&O's, cardiac diet, sodium restriction 2g and fluid restriction 1500 cc   Elevated protein, will check monoclonal protein study   Possible discharge tomorrow home with family and home health     Consultants/Specialty:  N/A  Review of Systems:   Review of Systems   Unable to perform ROS: Dementia "       Objective Data:   Physical Exam:   Vitals:   Temp:  [36.2 °C (97.2 °F)-36.7 °C (98.1 °F)] 36.2 °C (97.2 °F)  Pulse:  [71-94] 82  Resp:  [16-31] 16  BP: (113-157)/(57-76) 133/67  SpO2:  [93 %-100 %] 93 %    Physical Exam  Constitutional:       Comments: Thin    HENT:      Head: Normocephalic and atraumatic.      Nose: Nose normal.      Mouth/Throat:      Mouth: Mucous membranes are moist.   Eyes:      Extraocular Movements: Extraocular movements intact.      Pupils: Pupils are equal, round, and reactive to light.   Cardiovascular:      Rate and Rhythm: Normal rate and regular rhythm.      Pulses: Normal pulses.      Heart sounds: No murmur.      Comments: +JVD  Pulmonary:      Effort: Pulmonary effort is normal. No respiratory distress.      Breath sounds: No wheezing, rhonchi or rales.      Comments: Decreased lung sounds at the bases   Abdominal:      General: Bowel sounds are normal. There is no distension.      Palpations: Abdomen is soft.      Tenderness: There is no abdominal tenderness. There is no guarding or rebound.   Musculoskeletal:         General: Normal range of motion.      Cervical back: Normal range of motion and neck supple.      Right lower leg: Edema (trace) present.      Left lower leg: Edema (trace) present.   Skin:     General: Skin is warm.      Findings: No lesion or rash.   Neurological:      General: No focal deficit present.      Mental Status: She is alert. She is disoriented.      Cranial Nerves: No cranial nerve deficit.      Motor: Weakness present.   Psychiatric:         Mood and Affect: Mood normal.         Behavior: Behavior normal.      Comments: Dementia, alert to self            Labs:   Recent Labs     03/04/21 1744   WBC 8.3   RBC 3.86*   HEMOGLOBIN 10.7*   HEMATOCRIT 33.7*   MCV 87.3   MCH 27.7   RDW 47.7   PLATELETCT 72*   MPV 12.2   NEUTSPOLYS 86.30*   LYMPHOCYTES 4.90*   MONOCYTES 7.60   EOSINOPHILS 0.40   BASOPHILS 0.20     Recent Labs     03/04/21 1744  03/05/21  1754   SODIUM 125* 128*   POTASSIUM 4.6 4.6   CHLORIDE 91* 92*   CO2 23 26   GLUCOSE 149* 105*   BUN 18 23*       Imaging:   DX-CHEST-PORTABLE (1 VIEW)   Final Result      1.  Patchy bilateral pulmonary infiltrates. Consideration should be given for atypical infection or interstitial edema.      2.  Cardiomegaly.          Assessment and Plan:     * Acute exacerbation of CHF (congestive heart failure) ([facility])  Assessment & Plan  Patient has a history of CAD status post CABG, hyperlipidemia, hypertension, diabetes, stroke.Patient recently discharged from outside facility in [place] about a month ago, before she moved to [place].  She was diagnosed with new onset heart failure.  Echo result brought by daughter shows EF 30 to 35% with hypokinesis of apex anterior wall inferior wall inferior septal and mild inferolateral.  Moderate concentric left ventricle hypertrophy seen.  Grade 3 diastolic dysfunction with elevated left ventricular filling pressures.     Plan:  Resume coreg at home dose, per daughter patient was taking it regularly   Resume apixaban for at age adjusted dose for paroxysmal A. Fib  Increase lasix to 20 mg iv BID and monitor renal function, electrolytes   Compression hose for lower extremity edema   Continue aldactone and losartan   Daily weights, strict I&O's, cardiac diet, sodium restriction 2g and fluid restriction 1500 cc   Possible discharge tomorrow home with family and home health     Paroxysmal A-fib ([facility])- (present on admission)  Assessment & Plan  Resume apixaban at age adjusted dose     Hyperlipidemia  Assessment & Plan  Continue Lipitor 20 mg every night    Hypertension  Assessment & Plan  Continue home dose of Aldactone 12.5 milligrams p.o. daily and losartan 100 mg p.o. daily   Coreg   Holding Norvasc for now     DM (diabetes mellitus) ([facility])  Assessment & Plan  Resume home metformin and Januvia

## 2021-03-07 VITALS
TEMPERATURE: 96.9 F | HEART RATE: 70 BPM | OXYGEN SATURATION: 98 % | BODY MASS INDEX: 23.12 KG/M2 | RESPIRATION RATE: 16 BRPM | HEIGHT: 63 IN | WEIGHT: 130.51 LBS | SYSTOLIC BLOOD PRESSURE: 152 MMHG | DIASTOLIC BLOOD PRESSURE: 64 MMHG

## 2021-03-07 LAB
ANION GAP SERPL CALC-SCNC: 11 MMOL/L (ref 7–16)
BASOPHILS # BLD AUTO: 0.3 % (ref 0–1.8)
BASOPHILS # BLD: 0.02 K/UL (ref 0–0.12)
BUN SERPL-MCNC: 25 MG/DL (ref 8–22)
CALCIUM SERPL-MCNC: 9 MG/DL (ref 8.5–10.5)
CHLORIDE SERPL-SCNC: 90 MMOL/L (ref 96–112)
CO2 SERPL-SCNC: 25 MMOL/L (ref 20–33)
CREAT SERPL-MCNC: 0.53 MG/DL (ref 0.5–1.4)
CREAT UR-MCNC: 18.92 MG/DL
EOSINOPHIL # BLD AUTO: 0.07 K/UL (ref 0–0.51)
EOSINOPHIL NFR BLD: 1 % (ref 0–6.9)
ERYTHROCYTE [DISTWIDTH] IN BLOOD BY AUTOMATED COUNT: 45.8 FL (ref 35.9–50)
GLUCOSE SERPL-MCNC: 102 MG/DL (ref 65–99)
HCT VFR BLD AUTO: 30.3 % (ref 37–47)
HGB BLD-MCNC: 9.9 G/DL (ref 12–16)
IMM GRANULOCYTES # BLD AUTO: 0.05 K/UL (ref 0–0.11)
IMM GRANULOCYTES NFR BLD AUTO: 0.7 % (ref 0–0.9)
LYMPHOCYTES # BLD AUTO: 0.89 K/UL (ref 1–4.8)
LYMPHOCYTES NFR BLD: 13.2 % (ref 22–41)
MAGNESIUM SERPL-MCNC: 1.9 MG/DL (ref 1.5–2.5)
MCH RBC QN AUTO: 27.7 PG (ref 27–33)
MCHC RBC AUTO-ENTMCNC: 32.7 G/DL (ref 33.6–35)
MCV RBC AUTO: 84.9 FL (ref 81.4–97.8)
MONOCYTES # BLD AUTO: 0.83 K/UL (ref 0–0.85)
MONOCYTES NFR BLD AUTO: 12.3 % (ref 0–13.4)
NEUTROPHILS # BLD AUTO: 4.87 K/UL (ref 2–7.15)
NEUTROPHILS NFR BLD: 72.5 % (ref 44–72)
NRBC # BLD AUTO: 0 K/UL
NRBC BLD-RTO: 0 /100 WBC
OSMOLALITY UR: 241 MOSM/KG H2O (ref 300–900)
PLATELET # BLD AUTO: 151 K/UL (ref 164–446)
PMV BLD AUTO: 11.1 FL (ref 9–12.9)
POTASSIUM SERPL-SCNC: 3.9 MMOL/L (ref 3.6–5.5)
RBC # BLD AUTO: 3.57 M/UL (ref 4.2–5.4)
SODIUM SERPL-SCNC: 126 MMOL/L (ref 135–145)
SODIUM UR-SCNC: 55 MMOL/L
WBC # BLD AUTO: 6.7 K/UL (ref 4.8–10.8)

## 2021-03-07 PROCEDURE — A9270 NON-COVERED ITEM OR SERVICE: HCPCS | Performed by: STUDENT IN AN ORGANIZED HEALTH CARE EDUCATION/TRAINING PROGRAM

## 2021-03-07 PROCEDURE — 83735 ASSAY OF MAGNESIUM: CPT

## 2021-03-07 PROCEDURE — 99217 PR OBSERVATION CARE DISCHARGE: CPT | Mod: GC | Performed by: INTERNAL MEDICINE

## 2021-03-07 PROCEDURE — 700102 HCHG RX REV CODE 250 W/ 637 OVERRIDE(OP): Performed by: STUDENT IN AN ORGANIZED HEALTH CARE EDUCATION/TRAINING PROGRAM

## 2021-03-07 PROCEDURE — G0378 HOSPITAL OBSERVATION PER HR: HCPCS

## 2021-03-07 PROCEDURE — 82570 ASSAY OF URINE CREATININE: CPT

## 2021-03-07 PROCEDURE — 700111 HCHG RX REV CODE 636 W/ 250 OVERRIDE (IP): Performed by: STUDENT IN AN ORGANIZED HEALTH CARE EDUCATION/TRAINING PROGRAM

## 2021-03-07 PROCEDURE — 85025 COMPLETE CBC W/AUTO DIFF WBC: CPT

## 2021-03-07 PROCEDURE — 84300 ASSAY OF URINE SODIUM: CPT

## 2021-03-07 PROCEDURE — 83935 ASSAY OF URINE OSMOLALITY: CPT

## 2021-03-07 PROCEDURE — 80048 BASIC METABOLIC PNL TOTAL CA: CPT

## 2021-03-07 PROCEDURE — 36415 COLL VENOUS BLD VENIPUNCTURE: CPT

## 2021-03-07 PROCEDURE — 96376 TX/PRO/DX INJ SAME DRUG ADON: CPT

## 2021-03-07 RX ORDER — SPIRONOLACTONE 25 MG/1
12.5 TABLET ORAL DAILY
Qty: 15 TABLET | Refills: 0 | Status: SHIPPED
Start: 2021-03-08 | End: 2021-04-07

## 2021-03-07 RX ORDER — ATORVASTATIN CALCIUM 20 MG/1
20 TABLET, FILM COATED ORAL EVERY EVENING
Qty: 30 TABLET | Refills: 0 | Status: SHIPPED | OUTPATIENT
Start: 2021-03-07 | End: 2021-04-05 | Stop reason: SDUPTHER

## 2021-03-07 RX ORDER — LOSARTAN POTASSIUM 100 MG/1
100 TABLET ORAL DAILY
Qty: 30 EACH | Refills: 0 | Status: SHIPPED
Start: 2021-03-08 | End: 2021-03-08

## 2021-03-07 RX ORDER — POTASSIUM CHLORIDE 20 MEQ/1
20 TABLET, EXTENDED RELEASE ORAL ONCE
Status: COMPLETED | OUTPATIENT
Start: 2021-03-07 | End: 2021-03-07

## 2021-03-07 RX ORDER — CARVEDILOL 12.5 MG/1
12.5 TABLET ORAL 2 TIMES DAILY WITH MEALS
Qty: 60 TABLET | Refills: 0 | Status: SHIPPED
Start: 2021-03-07 | End: 2021-03-08

## 2021-03-07 RX ADMIN — FUROSEMIDE 20 MG: 10 INJECTION, SOLUTION INTRAMUSCULAR; INTRAVENOUS at 06:21

## 2021-03-07 RX ADMIN — CARVEDILOL 12.5 MG: 12.5 TABLET, FILM COATED ORAL at 08:37

## 2021-03-07 RX ADMIN — POTASSIUM CHLORIDE 20 MEQ: 1500 TABLET, EXTENDED RELEASE ORAL at 06:40

## 2021-03-07 RX ADMIN — SODIUM BICARBONATE 650 MG: 650 TABLET ORAL at 06:21

## 2021-03-07 RX ADMIN — Medication 1 TABLET: at 06:21

## 2021-03-07 RX ADMIN — METFORMIN HYDROCHLORIDE 1000 MG: 500 TABLET ORAL at 08:37

## 2021-03-07 RX ADMIN — APIXABAN 2.5 MG: 2.5 TABLET, FILM COATED ORAL at 06:21

## 2021-03-07 RX ADMIN — Medication 1000 UNITS: at 06:21

## 2021-03-07 RX ADMIN — SPIRONOLACTONE 12.5 MG: 25 TABLET ORAL at 06:22

## 2021-03-07 RX ADMIN — SITAGLIPTIN 100 MG: 100 TABLET, FILM COATED ORAL at 06:21

## 2021-03-07 ASSESSMENT — PAIN DESCRIPTION - PAIN TYPE: TYPE: ACUTE PAIN

## 2021-03-07 NOTE — CARE PLAN
Problem: Communication  Goal: The ability to communicate needs accurately and effectively will improve  Outcome: PROGRESSING AS EXPECTED     Problem: Safety  Goal: Will remain free from injury  Outcome: PROGRESSING AS EXPECTED  Goal: Will remain free from falls  Outcome: PROGRESSING AS EXPECTED     Problem: Skin Integrity  Goal: Risk for impaired skin integrity will decrease  Outcome: PROGRESSING AS EXPECTED

## 2021-03-07 NOTE — PROGRESS NOTES
Assumed care at 0700, bedside report received from Susana DURAN. Pt. Is SR on the monitor. Initial assessment completed, orders reviewed, call light within reach, bed alarm is in use, and hourly rounding in place. POC addressed with patient, no additional questions at this time.

## 2021-03-07 NOTE — PROGRESS NOTES
[facility] ATTENDING NOTE:   Adrián Palomino MD      Visit Time:   Attending/resident bedside rounds 9-11:30 AM     PATIENT ID  Name:             Yoon Richards     YOB: 1928  Age:                 89 y.o.  female   MRN:               [id number]  Admit:  3/4/2021     I saw and examined the patient and discussed the management with the resident staff.  I reviewed the resident's note and agree with the resident's findings and plan as documented in the resident's note except as documented in the attending note. Please reference resident daily note for complete information.    The chart was reviewed and summarized.  Available labs, imaging, O2 sats ,  EKGs were reviewed. Available nursing, consultant, and resident notes were reviewed. I am actively involved in the patient's care.     This patient was seen under COVID 19 pandemic disaster response conditions.  During a disaster, the provisions of care is subject to the Crisis Standard of Care                                                                                 ______________________________________________________________________            89(   admit March 4 , OBS )  INTERVAL:  Chart reviewed/summarized,     March 6AM:   AF< HR 71, , 97% 1L , MC 17/19  WBC 8.2, HB 11,  (72), Na 123, K 4.1, CO2 25, CR 0.67, , CA 9.8, , Glob 4.9   MEDS: apixiban      alert, confused, stasis edema, off O2 at rest, few rales -- diuresis, free water restriction , good family support , optimize HFrEF medications      March 5AM: AF, H R94, /, 96% RA  admit with confusion/dementia decompensation  ,  acute HF ?   NPT: 15/19  , cardiac diet, SRSB, BBB, fPACs  WBC 8.3, HB 11, PTL 72, , Na 125, K 4.6, CO2 23,  , UBN 18, C r0.54, CA 9.6, ALB 3.5, AST 17/8/137, BR 0.3, ALB 3.5, GLOB 4.9      CORE:  Code Status (   FULL  --------------------------------------------------------------------------------------------------  Hospital Summary/ Patient System Review      NP:   *admit(  FTT, dementia by hx, alert, nonfocal   Impression: vascular dementia, baseline ?  DPA daughter, good home support    Impression: cerebral ASVD, hx infarcts   - ASA Dcd (apixiban started)      EENT:   *admit(  neg     MSK/PAIN:   *admit(    Impression: narco use     CVS:   *admit(  HR 90s, -130, trop  36, pBNP 2499, noC3, 1+ BLE edema   ECHO: EF 30s, hypokinesis, apex, anterior, inferior wall, mild inferolateral, mod cLVH, 3+DD  EKG: SR, RBBB, QTc 484,    Impression: chronic CAD, CABG, DLD< HTN, DM, CVA, pAFIB (apixiban, DC ASA , reassess risks/benefits  )   - stable   Impression: HFrEF/acute/chronic  -- Norvasc, apixaban 5mg BID, ASA, atorvastatin 20mg, Coreg BID, Lasix, losartan 100mg,  spironolactone  Impression: nonischemic troponin  -- HF management   Impression: borderline QTC, asymptomatic, known cardiomyopathy      PULM:   *admit(  96% RA, pCXR: patchy bilateral infiltrates, no effusion, +CM, post sternotomy , basilar rales , no cough /no fever , BNP 2499   Impression: remote smoker   Impression: hypoxemic resp failure chronic (1L) , pulm edema vs non cardiac infiltrates --> HF management      GI:   *admit(  ALB 3.5, AST 17/8/137, BR 0.3, ALB 3.5, GLOB 4.9   neg exam;      CT abd recent?  - neg   Impression: incr ALP, normal BR , asymptomatic         :   *admit(       RENAL:   *admit(   Na 125, K 4.6, CO2 23,  , UBN 18, C r0.54, CA 9.6, ALB 3.5,  Alvarado 280  Impression: hypoNa, cardiorenal, SIADH ?, reset hypothalmus ?   - HF management , anti RAAS drugs as tolerated, H20 restriction as tolerated , V2 receptor inhibition ? (cost, liver toxicity , lack of long term benefit)      HEME:   *admit(  WBC 8.3, HB 11, PTL 72, ,  GLOB 4.9   Impression: high globulin, anemia 11 (NN, normal RDW)  --> SPEP if persists     ENDO:    *admit(    Impression: DM2, DLD, VIT D replacement  --> metformin Januvia 100mg (gliptin) , ACTOS,  ASA, Arb      DERM/BREAST:   *admit(    Impression: pressure, redness on buttocks - > wound care      ID:   *admit(  allergic, CIPRO /SULFA , COVID neg

## 2021-03-07 NOTE — DISCHARGE INSTRUCTIONS
Discharge Instructions    Discharged to home by car with relative. Discharged via wheelchair, hospital escort: Refused.  Special equipment needed: Not Applicable    Be sure to schedule a follow-up appointment with your primary care doctor or any specialists as instructed.     Discharge Plan:   Influenza Vaccine Indication: Patient Refuses    I understand that a diet low in cholesterol, fat, and sodium is recommended for good health. Unless I have been given specific instructions below for another diet, I accept this instruction as my diet prescription.     Special Instructions:   HF Patient Discharge Instructions  · Monitor your weight daily, and maintain a weight chart, to track your weight changes.   · Activity as tolerated, unless your Doctor has ordered otherwise.  · Follow a low fat, low cholesterol, low salt diet unless instructed otherwise by your Doctor. Read the labels on the back of food products and track your intake of fat, cholesterol and salt.   · Fluid Restriction No. If a Fluid Restriction has been ordered by your Doctor, measure fluids with a measuring cup to ensure that you are not exceeding the restriction.   · No smoking.  · Oxygen No. If your Doctor has ordered that you wear Oxygen at home, it is important to wear it as ordered.  · Did you receive an explanation from staff on the importance of taking each of your medications and why it is necessary to keep taking them unless your doctor says to stop? Yes  · Were all of your questions answered about how to manage your heart failure and what to do if you have increased signs and symptoms after you go home? Yes  · Do you feel like your heart failure care team involved you in the care treatment plan and allowed you to make decisions regarding your care while in the hospital and addressed any discharge needs you might have? Yes    See the educational handout provided at discharge for more information on monitoring your daily weight, activity and  diet. This also explains more about Heart Failure, symptoms of a flare-up and some of the tests that you have undergone.     Warning Signs of a Flare-Up include:  · Swelling in the ankles or lower legs.  · Shortness of breath, while at rest, or while doing normal activities.   · Shortness of breath at night when in bed, or coughing in bed.   · Requiring more pillows to sleep at night, or needing to sit up at night to sleep.  · Feeling weak, dizzy or fatigued.     When to call your Doctor:  · Call [facility] seven days a week from 8:00 a.m. to 8:00 p.m. for medical questions [phone].  · Call your Primary Care Physician or Cardiologist if:   1. You experience any pain radiating to your jaw or neck.  2. You have any difficulty breathing.  3. You experience weight gain of 3 lbs in a day or 5 lbs in a week.   4. You feel any palpitations or irregular heartbeats.  5. You become dizzy or lose consciousness.   If you have had an angiogram or had a pacemaker or AICD placed, and experience:  1. Bleeding, drainage or swelling at the surgical / puncture site.  2. Fever greater than 100.0 F  3. Shock from internal defibrillator.  4. Cool and / or numb extremities.      · Is patient discharged on Warfarin / Coumadin?   No     Apixaban oral tablets  What is this medicine?  APIXABAN (a PIX a ban) is an anticoagulant (blood thinner). It is used to lower the chance of stroke in people with a medical condition called atrial fibrillation. It is also used to treat or prevent blood clots in the lungs or in the veins.  This medicine may be used for other purposes; ask your health care provider or pharmacist if you have questions.  COMMON BRAND NAME(S): Eliquis  What should I tell my health care provider before I take this medicine?  They need to know if you have any of these conditions:  · antiphospholipid antibody syndrome  · bleeding disorders  · bleeding in the brain  · blood in your stools (black or tarry stools) or  if you have blood in your vomit  · history of blood clots  · history of stomach bleeding  · kidney disease  · liver disease  · mechanical heart valve  · an unusual or allergic reaction to apixaban, other medicines, foods, dyes, or preservatives  · pregnant or trying to get pregnant  · breast-feeding  How should I use this medicine?  Take this medicine by mouth with a glass of water. Follow the directions on the prescription label. You can take it with or without food. If it upsets your stomach, take it with food. Take your medicine at regular intervals. Do not take it more often than directed. Do not stop taking except on your doctor's advice. Stopping this medicine may increase your risk of a blood clot. Be sure to refill your prescription before you run out of medicine.  Talk to your pediatrician regarding the use of this medicine in children. Special care may be needed.  Overdosage: If you think you have taken too much of this medicine contact a poison control center or emergency room at once.  NOTE: This medicine is only for you. Do not share this medicine with others.  What if I miss a dose?  If you miss a dose, take it as soon as you can. If it is almost time for your next dose, take only that dose. Do not take double or extra doses.  What may interact with this medicine?  This medicine may interact with the following:  · aspirin and aspirin-like medicines  · certain medicines for fungal infections like ketoconazole and itraconazole  · certain medicines for seizures like carbamazepine and phenytoin  · certain medicines that treat or prevent blood clots like warfarin, enoxaparin, and dalteparin  · clarithromycin  · NSAIDs, medicines for pain and inflammation, like ibuprofen or naproxen  · rifampin  · ritonavir  · Lv's wort  This list may not describe all possible interactions. Give your health care provider a list of all the medicines, herbs, non-prescription drugs, or dietary supplements you use. Also  tell them if you smoke, drink alcohol, or use illegal drugs. Some items may interact with your medicine.  What should I watch for while using this medicine?  Visit your healthcare professional for regular checks on your progress. You may need blood work done while you are taking this medicine. Your condition will be monitored carefully while you are receiving this medicine. It is important not to miss any appointments.  Avoid sports and activities that might cause injury while you are using this medicine. Severe falls or injuries can cause unseen bleeding. Be careful when using sharp tools or knives. Consider using an electric razor. Take special care brushing or flossing your teeth. Report any injuries, bruising, or red spots on the skin to your healthcare professional.  If you are going to need surgery or other procedure, tell your healthcare professional that you are taking this medicine.  Wear a medical ID bracelet or chain. Carry a card that describes your disease and details of your medicine and dosage times.  What side effects may I notice from receiving this medicine?  Side effects that you should report to your doctor or health care professional as soon as possible:  · allergic reactions like skin rash, itching or hives, swelling of the face, lips, or tongue  · signs and symptoms of bleeding such as bloody or black, tarry stools; red or dark-brown urine; spitting up blood or brown material that looks like coffee grounds; red spots on the skin; unusual bruising or bleeding from the eye, gums, or nose  · signs and symptoms of a blood clot such as chest pain; shortness of breath; pain, swelling, or warmth in the leg  · signs and symptoms of a stroke such as changes in vision; confusion; trouble speaking or understanding; severe headaches; sudden numbness or weakness of the face, arm or leg; trouble walking; dizziness; loss of coordination  This list may not describe all possible side effects. Call your doctor  for medical advice about side effects. You may report side effects to FDA at 5-151-XTT-6021.  Where should I keep my medicine?  Keep out of the reach of children.  Store at room temperature between 20 and 25 degrees C (68 and 77 degrees F). Throw away any unused medicine after the expiration date.  NOTE: This sheet is a summary. It may not cover all possible information. If you have questions about this medicine, talk to your doctor, pharmacist, or health care provider.  © 2020 [employer]vier/Gold Standard (2019-08-28 17:39:34)      Atrial Fibrillation    Atrial fibrillation is a type of heartbeat that is irregular or fast (rapid). If you have this condition, your heart beats without any order. This makes it hard for your heart to pump blood in a normal way. Having this condition gives you more risk for stroke, heart failure, and other heart problems.  Atrial fibrillation may start all of a sudden and then stop on its own, or it may become a long-lasting problem.  What are the causes?  This condition may be caused by heart conditions, such as:  · High blood pressure.  · Heart failure.  · Heart valve disease.  · Heart surgery.  Other causes include:  · Pneumonia.  · Obstructive sleep apnea.  · Lung cancer.  · Thyroid disease.  · Drinking too much alcohol.  Sometimes the cause is not known.  What increases the risk?  You are more likely to develop this condition if:  · You smoke.  · You are older.  · You have diabetes.  · You are overweight.  · You have a family history of this condition.  · You exercise often and hard.  What are the signs or symptoms?  Common symptoms of this condition include:  · A feeling like your heart is beating very fast.  · Chest pain.  · Feeling short of breath.  · Feeling light-headed or weak.  · Getting tired easily.  Follow these instructions at home:  Medicines  · Take over-the-counter and prescription medicines only as told by your doctor.  · If your doctor gives you a blood-thinning medicine,  "take it exactly as told. Taking too much of it can cause bleeding. Taking too little of it does not protect you against clots. Clots can cause a stroke.  Lifestyle         · Do not use any tobacco products. These include cigarettes, chewing tobacco, and e-cigarettes. If you need help quitting, ask your doctor.  · Do not drink alcohol.  · Do not drink beverages that have caffeine. These include coffee, soda, and tea.  · Follow diet instructions as told by your doctor.  · Exercise regularly as told by your doctor.  General instructions  · If you have a condition that causes breathing to stop for a short period of time (apnea), treat it as told by your doctor.  · Keep a healthy weight. Do not use diet pills unless your doctor says they are safe for you. Diet pills may make heart problems worse.  · Keep all follow-up visits as told by your doctor. This is important.  Contact a doctor if:  · You notice a change in the speed, rhythm, or strength of your heartbeat.  · You are taking a blood-thinning medicine and you see more bruising.  · You get tired more easily when you move or exercise.  · You have a sudden change in weight.  Get help right away if:    · You have pain in your chest or your belly (abdomen).  · You have trouble breathing.  · You have blood in your vomit, poop, or pee (urine).  · You have any signs of a stroke. \"BE FAST\" is an easy way to remember the main warning signs:  ? B - Balance. Signs are dizziness, sudden trouble walking, or loss of balance.  ? E - Eyes. Signs are trouble seeing or a change in how you see.  ? F - Face. Signs are sudden weakness or loss of feeling in the face, or the face or eyelid drooping on one side.  ? A - Arms. Signs are weakness or loss of feeling in an arm. This happens suddenly and usually on one side of the body.  ? S - Speech. Signs are sudden trouble speaking, slurred speech, or trouble understanding what people say.  ? T - Time. Time to call emergency services. Write " down what time symptoms started.  · You have other signs of a stroke, such as:  ? A sudden, very bad headache with no known cause.  ? Feeling sick to your stomach (nausea).  ? Throwing up (vomiting).  ? Jerky movements you cannot control (seizure).  These symptoms may be an emergency. Do not wait to see if the symptoms will go away. Get medical help right away. Call your local emergency services (911 in the [place] Do not drive yourself to the hospital.  Summary  · Atrial fibrillation is a type of heartbeat that is irregular or fast (rapid).  · You are at higher risk of this condition if you smoke, are older, have diabetes, or are overweight.  · Follow your doctor's instructions about medicines, diet, exercise, and follow-up visits.  · Get help right away if you think that you have signs of a stroke.  This information is not intended to replace advice given to you by your health care provider. Make sure you discuss any questions you have with your health care provider.  Document Released: 09/26/2009 Document Revised: 02/21/2019 Document Reviewed: 02/08/2019  [employer] Patient Education © 2020 [employer]        Depression / Suicide Risk    As you are discharged from this [facility] facility, it is important to learn how to keep safe from harming yourself.    Recognize the warning signs:  · Abrupt changes in personality, positive or negative- including increase in energy   · Giving away possessions  · Change in eating patterns- significant weight changes-  positive or negative  · Change in sleeping patterns- unable to sleep or sleeping all the time   · Unwillingness or inability to communicate  · Depression  · Unusual sadness, discouragement and loneliness  · Talk of wanting to die  · Neglect of personal appearance   · Rebelliousness- reckless behavior  · Withdrawal from people/activities they love  · Confusion- inability to concentrate     If you or a loved one observes any of these behaviors or has concerns  about self-harm, here's what you can do:  · Talk about it- your feelings and reasons for harming yourself  · Remove any means that you might use to hurt yourself (examples: pills, rope, extension cords, firearm)  · Get professional help from the community (Mental Health, Substance Abuse, psychological counseling)  · Do not be alone:Call your Safe Contact- someone whom you trust who will be there for you.  · Call your local CRISIS HOTLINE 103-2946 or 482-490-7909  · Call your local Children's Mobile Crisis Response Team [place] (369) 283-1706 or www.mygall  · Call the toll free National Suicide Prevention Hotlines   · National Suicide Prevention Lifeline 495-780-PPCB (2876)  · National Hope Line Network 800-SUICIDE (366-5032)

## 2021-03-07 NOTE — PROGRESS NOTES
"Daily Progress Note:     Date of Service: 3/6/2021  Primary Team: [facility] Red Team    Attending: Adrián Palomino M.D.   Senior Resident: Alexander Rodgers MD  Contact:  [phone]    Chief Complaint:   Weakness        per pt's daughter she has had generalized weakness x2-3 days      ID: \"89 y.o. female who presented 3/4/2021 with generalized weakness for last 2 days.  Patient has a history of dementia at baseline and is unable to provide a reliable history, thus history provided by daughter at bedside.Patient has a history of CAD status post CABG, hyperlipidemia, hypertension, diabetes, stroke.Patient recently discharged from outside facility in [place] about a month ago, before she moved to [place].  She was diagnosed with new onset heart failure.  Echo result brought by daughter shows EF 30 to 35% with hypokinesis of apex anterior wall inferior wall inferior septal and mild inferolateral.  Moderate concentric left ventricle hypertrophy seen.  Grade 3 diastolic dysfunction with elevated left ventricular filling pressures.  Patient was diagnosed with what sounds like paroxysmal A. fib in hospital\"     Interval Update:   Patient seen at bedside, baseline dementia, alert and oriented to self, spoke with daughter, will keep patient in hospital additional day in view of hyponatremia and additional work-up.  Aspirin discontinued, spoke with daughter about this, patient to be on Eliquis for paroxysmal A. fib.    Plan:  Resume coreg at home dose, per daughter patient was taking it regularly   Resume apixaban at age adjusted dose for paroxysmal A. Fib  Increase lasix to 20 mg iv BID and monitor renal function, electrolytes closely sodium  Compression hose for lower extremity edema   Resume home metformin and Januvia   Continue aldactone and losartan   Daily weights, strict I&O's, cardiac diet, sodium restriction 2g and fluid restriction 1500 cc   Elevated protein, SPEP pending  Possible discharge tomorrow home with family and " home health     Consultants/Specialty:  N/A  Review of Systems:   Review of Systems   Unable to perform ROS: Dementia       Objective Data:   Physical Exam:   Vitals:   Temp:  [36.1 °C (96.9 °F)-37.4 °C (99.3 °F)] 36.3 °C (97.3 °F)  Pulse:  [71-94] 76  Resp:  [16-18] 16  BP: (116-145)/(54-72) 145/54  SpO2:  [90 %-100 %] 100 %    Physical Exam  Constitutional:       Comments: Thin    HENT:      Head: Normocephalic and atraumatic.      Nose: Nose normal.      Mouth/Throat:      Mouth: Mucous membranes are moist.   Eyes:      Extraocular Movements: Extraocular movements intact.      Pupils: Pupils are equal, round, and reactive to light.   Cardiovascular:      Rate and Rhythm: Normal rate and regular rhythm.      Pulses: Normal pulses.      Heart sounds: No murmur.      Comments: +JVD  Pulmonary:      Effort: Pulmonary effort is normal. No respiratory distress.      Breath sounds: No wheezing, rhonchi or rales.      Comments: Decreased lung sounds at the bases   Abdominal:      General: Bowel sounds are normal. There is no distension.      Palpations: Abdomen is soft.      Tenderness: There is no abdominal tenderness. There is no guarding or rebound.   Musculoskeletal:         General: Normal range of motion.      Cervical back: Normal range of motion and neck supple.      Right lower leg: Edema (trace) present.      Left lower leg: Edema (trace) present.   Skin:     General: Skin is warm.      Findings: No lesion or rash.   Neurological:      General: No focal deficit present.      Mental Status: She is alert. She is disoriented.      Cranial Nerves: No cranial nerve deficit.      Motor: Weakness present.   Psychiatric:         Mood and Affect: Mood normal.         Behavior: Behavior normal.      Comments: Dementia, alert to self            Labs:   Recent Labs     03/04/21  1744 03/06/21  0346   WBC 8.3 8.2   RBC 3.86* 3.96*   HEMOGLOBIN 10.7* 11.2*   HEMATOCRIT 33.7* 34.1*   MCV 87.3 86.1   MCH 27.7 28.3   RDW 47.7  47.0   PLATELETCT 72* 133*   MPV 12.2 11.3   NEUTSPOLYS 86.30* 77.50*   LYMPHOCYTES 4.90* 11.00*   MONOCYTES 7.60 9.50   EOSINOPHILS 0.40 0.80   BASOPHILS 0.20 0.60     Recent Labs     03/05/21  1754 03/06/21  0346 03/06/21  1357   SODIUM 128* 123* 125*   POTASSIUM 4.6 4.1 4.1   CHLORIDE 92* 87* 87*   CO2 26 25 24   GLUCOSE 105* 140* 209*   BUN 23* 26* 24*       Imaging:   DX-CHEST-PORTABLE (1 VIEW)   Final Result      1.  Patchy bilateral pulmonary infiltrates. Consideration should be given for atypical infection or interstitial edema.      2.  Cardiomegaly.          Assessment and Plan:     * Acute exacerbation of CHF (congestive heart failure) ([facility])  Assessment & Plan  Patient has a history of CAD status post CABG, hyperlipidemia, hypertension, diabetes, stroke.Patient recently discharged from outside facility in [place] about a month ago, before she moved to [place].  She was diagnosed with new onset heart failure.  Echo result brought by daughter shows EF 30 to 35% with hypokinesis of apex anterior wall inferior wall inferior septal and mild inferolateral.  Moderate concentric left ventricle hypertrophy seen.  Grade 3 diastolic dysfunction with elevated left ventricular filling pressures.     Plan:  Resume coreg at home dose, per daughter patient was taking it regularly   Resume apixaban for at age adjusted dose for paroxysmal A. Fib  Increase lasix to 20 mg iv BID and monitor renal function, electrolytes   Compression hose for lower extremity edema   Continue aldactone and losartan   Daily weights, strict I&O's, cardiac diet, sodium restriction 2g and fluid restriction 1500 cc   Possible discharge tomorrow home with family and home health     Paroxysmal A-fib (HCC)- (present on admission)  Assessment & Plan  Resume apixaban at age adjusted dose, no current indication for rate control    Dementia ([facility])  Assessment & Plan  Advanced, oriented only to self, alert, daughter's decision  maker/POA    Hyponatremia  Assessment & Plan  Likely hypotonic hyponatremia, she is volume overloaded, serum osmolality WNL, urine osmolality/urine sodium pending, urine sodium likely confounded by Lasix, will continue Lasix at present dose, possibly start vaptans, CLARITA hose, closely monitor BMP watch for altering sensorium currently she is GCS 15 and alert although entered only to self    Hyperlipidemia  Assessment & Plan  Continue Lipitor 20 mg every night    Hypertension  Assessment & Plan  Continue home dose of Aldactone 12.5 milligrams p.o. daily and losartan 100 mg p.o. daily   Coreg   Holding Norvasc for now     DM (diabetes mellitus) (HCC)  Assessment & Plan  Resume home metformin and Januvia

## 2021-03-07 NOTE — PROGRESS NOTES
Pt dc'd. IV and monitor removed; monitor room notified. Pt left unit via wheelchair with this RN. Personal belongings with pt when leaving unit. Pt given discharge instructions prior to leaving unit including where to  prescriptions and when to follow-up; verbalizes understanding. Copy of discharge instructions with pt and in the chart.

## 2021-03-07 NOTE — ASSESSMENT & PLAN NOTE
Likely hypotonic hyponatremia, she is volume overloaded, serum osmolality WNL, urine osmolality/urine sodium pending, urine sodium likely confounded by Lasix, will continue Lasix at present dose, possibly start vaptans, CLARITA hose, closely monitor BMP watch for altering sensorium currently she is GCS 15 and alert although entered only to self

## 2021-03-08 ENCOUNTER — OFFICE VISIT (OUTPATIENT)
Dept: CARDIOLOGY | Facility: MEDICAL CENTER | Age: 86
End: 2021-03-08
Payer: MEDICARE

## 2021-03-08 ENCOUNTER — TELEPHONE (OUTPATIENT)
Dept: HEALTH INFORMATION MANAGEMENT | Facility: OTHER | Age: 86
End: 2021-03-08

## 2021-03-08 VITALS
RESPIRATION RATE: 16 BRPM | HEART RATE: 93 BPM | BODY MASS INDEX: 23.12 KG/M2 | SYSTOLIC BLOOD PRESSURE: 84 MMHG | DIASTOLIC BLOOD PRESSURE: 62 MMHG | OXYGEN SATURATION: 100 % | HEIGHT: 63 IN

## 2021-03-08 DIAGNOSIS — R77.1 ELEVATED SERUM GLOBULIN LEVEL: ICD-10-CM

## 2021-03-08 DIAGNOSIS — E78.5 HYPERLIPIDEMIA, UNSPECIFIED HYPERLIPIDEMIA TYPE: ICD-10-CM

## 2021-03-08 DIAGNOSIS — I48.0 PAROXYSMAL A-FIB (HCC): ICD-10-CM

## 2021-03-08 DIAGNOSIS — E87.1 HYPONATREMIA: ICD-10-CM

## 2021-03-08 DIAGNOSIS — I10 ESSENTIAL HYPERTENSION: ICD-10-CM

## 2021-03-08 DIAGNOSIS — E11.9 TYPE 2 DIABETES MELLITUS WITHOUT COMPLICATION, WITHOUT LONG-TERM CURRENT USE OF INSULIN (HCC): ICD-10-CM

## 2021-03-08 DIAGNOSIS — I50.43 ACUTE ON CHRONIC COMBINED SYSTOLIC AND DIASTOLIC CONGESTIVE HEART FAILURE (HCC): ICD-10-CM

## 2021-03-08 LAB — EKG IMPRESSION: NORMAL

## 2021-03-08 PROCEDURE — 93000 ELECTROCARDIOGRAM COMPLETE: CPT | Performed by: INTERNAL MEDICINE

## 2021-03-08 PROCEDURE — 99205 OFFICE O/P NEW HI 60 MIN: CPT | Mod: 25 | Performed by: INTERNAL MEDICINE

## 2021-03-08 RX ORDER — LOSARTAN POTASSIUM 50 MG/1
50 TABLET ORAL DAILY
Qty: 30 TABLET | Refills: 11 | Status: SHIPPED | OUTPATIENT
Start: 2021-03-08 | End: 2021-04-01 | Stop reason: SDUPTHER

## 2021-03-08 RX ORDER — CARVEDILOL 6.25 MG/1
6.25 TABLET ORAL 2 TIMES DAILY WITH MEALS
Qty: 60 TABLET | Refills: 11 | Status: SHIPPED | OUTPATIENT
Start: 2021-03-08 | End: 2021-04-01 | Stop reason: SDUPTHER

## 2021-03-08 ASSESSMENT — ENCOUNTER SYMPTOMS
WEAKNESS: 0
FEVER: 0
LOSS OF CONSCIOUSNESS: 0
ORTHOPNEA: 0
EYES NEGATIVE: 1
DIZZINESS: 0
CONSTITUTIONAL NEGATIVE: 1
BRUISES/BLEEDS EASILY: 0
STRIDOR: 0
GASTROINTESTINAL NEGATIVE: 1
NEUROLOGICAL NEGATIVE: 1
CLAUDICATION: 0
SHORTNESS OF BREATH: 1
MUSCULOSKELETAL NEGATIVE: 1
COUGH: 0
PALPITATIONS: 0
PND: 0
CARDIOVASCULAR NEGATIVE: 1
HEMOPTYSIS: 0
SPUTUM PRODUCTION: 0
WHEEZING: 0
CHILLS: 0
SORE THROAT: 0

## 2021-03-08 NOTE — TELEPHONE ENCOUNTER
"HTH/SCP LSW placed outreach call to mbr to follow up regarding hospital discharge. Mbr's daughter answered and explained mbr has been \"really tired and her left hip in hurting\". She reported [facility] had called and have an appt scheduled for start of care. LSW reviewed [facility] benefits with daughter who voiced interest as she explained it is very hard to get mbr out of the home currently. She reported mbr lives with her and recently moved to the [facility] from [place]. She discussed possible need for care giver services when she is at work. Per report mbr was previously getting some [facility] in [place] [facility] as she is the spouse of a . LSW encouraged daughter to reach out to [facility] and work on establishing [facility]br with this [facility] for possible additional resoruces. Also discussed programs though [employer]. Information on [employer] programs emailed to daughter via secure e-mail. Lastly reviewed Advanced Directives. Mbr has completed a financial POA but no healthcare per daughter. She would like mbr to complete and requested packet be mailed. LSW will mail packet to mbr and daughter.     Referral sent via secure email to [facility].         "

## 2021-03-08 NOTE — PROGRESS NOTES
Chief Complaint   Patient presents with   • Congestive Heart Failure       Subjective:   Yoon Richards is a 89 y.o. female who presents today as a new consultation for heart failure with reduced ejection fraction.  Is she is a 89-year-old female the past medical history of CAD status post CABG in 2006.  She recently relocated to [facility].  She does have some degree of dementia.  She was treated in the hospital with the residents who started on guideline directed medical therapy.  Since that her blood pressures drifted low.  She is euvolemic.    Past Medical History:   Diagnosis Date   • CAD (coronary artery disease)    • Chronic systolic heart failure (HCC)    • Dementia (HCC)    • Diabetes (HCC)    • High cholesterol    • Hypertension      Past Surgical History:   Procedure Laterality Date   • CHOLECYSTECTOMY     • CORONARY ARTERY BYPAS, 4       History reviewed. No pertinent family history.  Social History     Socioeconomic History   • Marital status:      Spouse name: Not on file   • Number of children: Not on file   • Years of education: Not on file   • Highest education level: Not on file   Occupational History   • Not on file   Tobacco Use   • Smoking status: Former Smoker   • Smokeless tobacco: Never Used   Substance and Sexual Activity   • Alcohol use: Never   • Drug use: Never   • Sexual activity: Not Currently   Other Topics Concern   • Not on file   Social History Narrative   • Not on file     Social Determinants of Health     Financial Resource Strain:    • Difficulty of Paying Living Expenses:    Food Insecurity:    • Worried About Running Out of Food in the Last Year:    • Ran Out of Food in the Last Year:    Transportation Needs:    • Lack of Transportation (Medical):    • Lack of Transportation (Non-Medical):    Physical Activity:    • Days of Exercise per Week:    • Minutes of Exercise per Session:    Stress:    • Feeling of Stress :    Social Connections:    • Frequency of Communication  with Friends and Family:    • Frequency of Social Gatherings with Friends and Family:    • Attends Z[employer]ast[employer] Services:    • Active Member of Clubs or Organizations:    • Attends Club or Organization Meetings:    • Marital Status:    Intimate Partner Violence:    • Fear of Current or Ex-Partner:    • Emotionally Abused:    • Physically Abused:    • Sexually Abused:      Allergies   Allergen Reactions   • Ciprofloxacin Hcl    • Donepezil    • Hydrochlorothiazide    • Sulfa Drugs      Outpatient Encounter Medications as of 3/8/2021   Medication Sig Dispense Refill   • carvedilol (COREG) 6.25 MG Tab Take 1 tablet by mouth 2 times a day, with meals. 60 tablet 11   • losartan (COZAAR) 50 MG Tab Take 1 tablet by mouth every day. 30 tablet 11   • Calcium Carb-Cholecalciferol (OYSTER SHELL CALCIUM/VITAMIN D) 250-125 MG-UNIT Tab tablet Take 1 tablet by mouth every day for 30 days. 30 tablet 0   • atorvastatin (LIPITOR) 20 MG Tab Take 1 tablet by mouth every evening for 30 days. 30 tablet 0   • metFORMIN (GLUCOPHAGE) 1000 MG tablet Take 1 tablet by mouth 2 times a day, with meals for 30 days. 60 tablet 0   • spironolactone (ALDACTONE) 25 MG Tab Take 0.5 Tablets by mouth every day for 30 days. 15 tablet 0   • vitamin D (VITAMIND D3) 1000 UNIT Tab Take 1 tablet by mouth every day for 30 days. 30 tablet 0   • apixaban (ELIQUIS) 2.5mg Tab Take 1 tablet by mouth 2 Times a Day for 30 days. Indications: Thromboembolism secondary to Atrial Fibrillation 60 tablet 0   • SITagliptin (JANUVIA) 100 MG Tab Take 1 tablet by mouth every day for 30 days. 30 tablet 0   • thiamine (VITAMIN B-1) 100 MG Tab Take 100 mg by mouth every day.     • furosemide (LASIX) 20 MG Tab Take 20 mg by mouth every day.     • Iron-Vit C-Vit B12-Folic Acid (IRON 100 PLUS PO) Take  by mouth.     • [DISCONTINUED] carvedilol (COREG) 12.5 MG Tab Take 1 tablet by mouth 2 times a day, with meals for 30 days. 60 tablet 0   • [DISCONTINUED] losartan (COZAAR) 100 MG Tab  "Take 1 tablet by mouth every day for 30 days. 30 Each 0     No facility-administered encounter medications on file as of 3/8/2021.     Review of Systems   Constitutional: Negative.  Negative for chills, fever and malaise/fatigue.   HENT: Negative.  Negative for sore throat.    Eyes: Negative.    Respiratory: Positive for shortness of breath. Negative for cough, hemoptysis, sputum production, wheezing and stridor.    Cardiovascular: Negative.  Negative for chest pain, palpitations, orthopnea, claudication, leg swelling and PND.   Gastrointestinal: Negative.    Genitourinary: Negative.    Musculoskeletal: Negative.    Skin: Negative.    Neurological: Negative.  Negative for dizziness, loss of consciousness and weakness.   Endo/Heme/Allergies: Negative.  Does not bruise/bleed easily.   All other systems reviewed and are negative.       Objective:   BP (!) 84/62 (BP Location: Left arm, Patient Position: Sitting, BP Cuff Size: Adult)   Pulse 93   Resp 16   Ht 1.6 m (5' 3\")   SpO2 100%   BMI 23.12 kg/m²     Physical Exam   Constitutional: She appears well-developed and well-nourished. No distress.   HENT:   Head: Normocephalic and atraumatic.   Right Ear: External ear normal.   Left Ear: External ear normal.   Nose: Nose normal.   Mouth/Throat: No oropharyngeal exudate.   Eyes: Pupils are equal, round, and reactive to light. Conjunctivae and EOM are normal. Right eye exhibits no discharge. Left eye exhibits no discharge. No scleral icterus.   Neck: No JVD present.   Cardiovascular: Normal rate, regular rhythm and intact distal pulses. Exam reveals no gallop and no friction rub.   No murmur heard.  Pulmonary/Chest: Effort normal. No stridor. No respiratory distress. She has no wheezes. She has no rales. She exhibits no tenderness.   Abdominal: Soft. She exhibits no distension. There is no guarding.   Musculoskeletal:         General: No tenderness, deformity or edema. Normal range of motion.      Cervical back: Neck " supple.   Neurological: She is alert. She has normal reflexes. She displays normal reflexes. No cranial nerve deficit. She exhibits normal muscle tone. Coordination normal.   Skin: Skin is warm and dry. No rash noted. She is not diaphoretic. No erythema. No pallor.   Psychiatric: She has a normal mood and affect. Her behavior is normal. Judgment and thought content normal.   Nursing note and vitals reviewed.      Assessment:     1. Acute on chronic combined systolic and diastolic congestive heart failure (HCC)  EKG    carvedilol (COREG) 6.25 MG Tab    losartan (COZAAR) 50 MG Tab    Comp Metabolic Panel    CBC W/ DIFF W/O PLATELETS   2. Paroxysmal A-fib (HCC)  carvedilol (COREG) 6.25 MG Tab    losartan (COZAAR) 50 MG Tab    Comp Metabolic Panel    CBC W/ DIFF W/O PLATELETS   3. Hyponatremia  Comp Metabolic Panel    CBC W/ DIFF W/O PLATELETS   4. Essential hypertension  carvedilol (COREG) 6.25 MG Tab   5. Hyperlipidemia, unspecified hyperlipidemia type  losartan (COZAAR) 50 MG Tab   6. Elevated serum globulin level     7. Type 2 diabetes mellitus without complication, without long-term current use of insulin (HCC)         Medical Decision Making:  Today's Assessment / Status / Plan:     89-year-old female with heart failure with reduced ejection fraction likely from multivessel coronary disease.  I think that the merits of a echocardiogram are limited and the patient 89 with some degree of dementia.  Either way I think that she is a little too hypotensive.  I stopped her oxygen as she currently is having normal oxygen level now that she is diuresed.  I will reduce her carvedilol and losartan to allow her blood pressure to become more permissive as I think that she is currently at risk for a fall.  I will like to check a CBC and a CMP.  I like to see her back in a few weeks.

## 2021-03-09 ENCOUNTER — HOME CARE VISIT (OUTPATIENT)
Dept: HOME HEALTH SERVICES | Facility: HOME HEALTHCARE | Age: 86
End: 2021-03-09
Payer: MEDICARE

## 2021-03-09 ENCOUNTER — TELEPHONE (OUTPATIENT)
Dept: CARDIOLOGY | Facility: MEDICAL CENTER | Age: 86
End: 2021-03-09

## 2021-03-09 ENCOUNTER — HOSPITAL ENCOUNTER (OUTPATIENT)
Facility: MEDICAL CENTER | Age: 86
End: 2021-03-09
Attending: INTERNAL MEDICINE
Payer: MEDICARE

## 2021-03-09 VITALS
DIASTOLIC BLOOD PRESSURE: 60 MMHG | OXYGEN SATURATION: 99 % | BODY MASS INDEX: 22.57 KG/M2 | SYSTOLIC BLOOD PRESSURE: 130 MMHG | TEMPERATURE: 97.6 F | HEART RATE: 91 BPM | RESPIRATION RATE: 16 BRPM | HEIGHT: 63 IN | WEIGHT: 127.4 LBS

## 2021-03-09 LAB
ALBUMIN SERPL BCP-MCNC: 3.2 G/DL (ref 3.2–4.9)
ALBUMIN SERPL ELPH-MCNC: 3.58 G/DL (ref 3.75–5.01)
ALBUMIN/GLOB SERPL: 0.6 G/DL
ALP SERPL-CCNC: 117 U/L (ref 30–99)
ALPHA1 GLOB SERPL ELPH-MCNC: 0.44 G/DL (ref 0.19–0.46)
ALPHA2 GLOB SERPL ELPH-MCNC: 0.84 G/DL (ref 0.48–1.05)
ALT SERPL-CCNC: 8 U/L (ref 2–50)
ANION GAP SERPL CALC-SCNC: 11 MMOL/L (ref 7–16)
AST SERPL-CCNC: 11 U/L (ref 12–45)
B-GLOBULIN SERPL ELPH-MCNC: 0.91 G/DL (ref 0.48–1.1)
BASOPHILS # BLD AUTO: 0.2 % (ref 0–1.8)
BASOPHILS # BLD: 0.02 K/UL (ref 0–0.12)
BILIRUB SERPL-MCNC: 0.4 MG/DL (ref 0.1–1.5)
BUN SERPL-MCNC: 26 MG/DL (ref 8–22)
CALCIUM SERPL-MCNC: 9.6 MG/DL (ref 8.5–10.5)
CHLORIDE SERPL-SCNC: 94 MMOL/L (ref 96–112)
CO2 SERPL-SCNC: 25 MMOL/L (ref 20–33)
COMMENT 1642: NORMAL
CREAT SERPL-MCNC: 0.65 MG/DL (ref 0.5–1.4)
EOSINOPHIL # BLD AUTO: 0.03 K/UL (ref 0–0.51)
EOSINOPHIL NFR BLD: 0.3 % (ref 0–6.9)
ERYTHROCYTE [DISTWIDTH] IN BLOOD BY AUTOMATED COUNT: 47.4 FL (ref 35.9–50)
GAMMA GLOB SERPL ELPH-MCNC: 2.13 G/DL (ref 0.62–1.51)
GLOBULIN SER CALC-MCNC: 5 G/DL (ref 1.9–3.5)
GLUCOSE SERPL-MCNC: 110 MG/DL (ref 65–99)
HCT VFR BLD AUTO: 32.8 % (ref 37–47)
HGB BLD-MCNC: 10.4 G/DL (ref 12–16)
IMM GRANULOCYTES # BLD AUTO: 0.04 K/UL (ref 0–0.11)
IMM GRANULOCYTES NFR BLD AUTO: 0.4 % (ref 0–0.9)
INTERPRETATION SERPL IFE-IMP: ABNORMAL
INTERPRETATION SERPL IFE-IMP: ABNORMAL
LYMPHOCYTES # BLD AUTO: 0.66 K/UL (ref 1–4.8)
LYMPHOCYTES NFR BLD: 6.9 % (ref 22–41)
MCH RBC QN AUTO: 28 PG (ref 27–33)
MCHC RBC AUTO-ENTMCNC: 31.7 G/DL (ref 33.6–35)
MCV RBC AUTO: 88.4 FL (ref 81.4–97.8)
MONOCYTES # BLD AUTO: 0.89 K/UL (ref 0–0.85)
MONOCYTES NFR BLD AUTO: 9.3 % (ref 0–13.4)
MORPHOLOGY BLD-IMP: NORMAL
NEUTROPHILS # BLD AUTO: 7.95 K/UL (ref 2–7.15)
NEUTROPHILS NFR BLD: 82.9 % (ref 44–72)
NRBC # BLD AUTO: 0 K/UL
NRBC BLD-RTO: 0 /100 WBC
PATHOLOGY STUDY: ABNORMAL
PLATELET # BLD AUTO: 199 K/UL (ref 164–446)
PMV BLD AUTO: 12.4 FL (ref 9–12.9)
POTASSIUM SERPL-SCNC: 4.5 MMOL/L (ref 3.6–5.5)
PROT SERPL-MCNC: 7.9 G/DL (ref 6.3–8.2)
PROT SERPL-MCNC: 8.2 G/DL (ref 6–8.2)
RBC # BLD AUTO: 3.71 M/UL (ref 4.2–5.4)
SODIUM SERPL-SCNC: 130 MMOL/L (ref 135–145)
WBC # BLD AUTO: 9.6 K/UL (ref 4.8–10.8)

## 2021-03-09 PROCEDURE — 85025 COMPLETE CBC W/AUTO DIFF WBC: CPT

## 2021-03-09 PROCEDURE — 80053 COMPREHEN METABOLIC PANEL: CPT

## 2021-03-09 PROCEDURE — G0493 RN CARE EA 15 MIN HH/HOSPICE: HCPCS

## 2021-03-09 PROCEDURE — 665001 SOC-HOME HEALTH

## 2021-03-09 SDOH — ECONOMIC STABILITY: HOUSING INSECURITY
HOME SAFETY: OXYGEN SAFETY RISK ASSESSMENT PERFORMED. PATIENT PT WAS GIVEN A NO SMOKING SIGN AND PROVIDED EDUCATION ABOUT WHY IT IS IMPORTANT TO PLACE ONE. PATIENT DOES HAVE A WORKING FIRE EXTINGUISHER PRESENT IN THE HOME. SMOKE ALARMS ARE PRESENT AND FUNCTIONAL

## 2021-03-09 SDOH — ECONOMIC STABILITY: HOUSING INSECURITY
HOME SAFETY: ON EACH LEVEL OF THE HOME. PATIENT DOES HAVE A FIRE ESCAPE PLAN DEVELOPED. PATIENT DOES NOT HAVE FLAMMABLE MATERIALS PRESENT IN THE HOME PRESENTING A FIRE HAZARD. NO EVIDENCE FOUND OF SMOKING MATERIALS PRESENT IN THE HOME.    FAMILY SMOKES BUT NEVER

## 2021-03-09 SDOH — ECONOMIC STABILITY: HOUSING INSECURITY: HOME SAFETY: INSIDE HOME  HAS GAS RANGE, PATIENT DOES NOT COOK AND OXYGEN DOES NOT REACH INTO KITCHEN.

## 2021-03-09 SDOH — ECONOMIC STABILITY: HOUSING INSECURITY: EVIDENCE OF SMOKING MATERIAL: 0

## 2021-03-09 ASSESSMENT — PATIENT HEALTH QUESTIONNAIRE - PHQ9
2. FEELING DOWN, DEPRESSED, IRRITABLE, OR HOPELESS: 00
1. LITTLE INTEREST OR PLEASURE IN DOING THINGS: 00
CLINICAL INTERPRETATION OF PHQ2 SCORE: 0

## 2021-03-09 ASSESSMENT — ENCOUNTER SYMPTOMS
VOMITING: DENIES
SHORTNESS OF BREATH: T
NAUSEA: DENIES

## 2021-03-09 ASSESSMENT — FIBROSIS 4 INDEX: FIB4 SCORE: 2.34

## 2021-03-09 ASSESSMENT — ACTIVITIES OF DAILY LIVING (ADL): OASIS_M1830: 03

## 2021-03-09 NOTE — TELEPHONE ENCOUNTER
Patient checked oxygen levels at home was 71% and freezing. Slept for a little bit and warmed up was still 81% so now back on oxygen.

## 2021-03-10 ENCOUNTER — HOME CARE VISIT (OUTPATIENT)
Dept: HOME HEALTH SERVICES | Facility: HOME HEALTHCARE | Age: 86
End: 2021-03-10
Payer: MEDICARE

## 2021-03-10 ENCOUNTER — TELEPHONE (OUTPATIENT)
Dept: CARDIOLOGY | Facility: MEDICAL CENTER | Age: 86
End: 2021-03-10

## 2021-03-10 VITALS — BODY MASS INDEX: 22.25 KG/M2 | WEIGHT: 125.6 LBS

## 2021-03-10 PROCEDURE — G0151 HHCP-SERV OF PT,EA 15 MIN: HCPCS

## 2021-03-10 PROCEDURE — G0153 HHCP-SVS OF S/L PATH,EA 15MN: HCPCS

## 2021-03-10 SDOH — ECONOMIC STABILITY: HOUSING INSECURITY: EVIDENCE OF SMOKING MATERIAL: 0

## 2021-03-10 SDOH — ECONOMIC STABILITY: HOUSING INSECURITY: HOME SAFETY: SE, AVOID RETURNING TO 3WW AS UNSAFE (TIP HAZARD) - THEY AGREED

## 2021-03-10 SDOH — ECONOMIC STABILITY: HOUSING INSECURITY
HOME SAFETY: OXYGEN SAFETY RISK ASSESSMENT PERFORMED. PATIENT DOES HAVE A NO SMOKING SIGN POSTED IN THE HOME. PATIENT DOES HAVE A WORKING FIRE EXTINGUISHER PRESENT IN THE HOME. SMOKE ALARMS ARE PRESENT AND FUNCTIONAL ON EACH LEVEL OF THE HOME. PATIENT DOES HAVE A

## 2021-03-10 SDOH — ECONOMIC STABILITY: HOUSING INSECURITY
HOME SAFETY: FIRE ESCAPE PLAN DEVELOPED. PATIENT DOES NOT HAVE FLAMMABLE MATERIALS PRESENT IN THE HOME PRESENTING A FIRE HAZARD. NO EVIDENCE FOUND OF SMOKING MATERIALS PRESENT IN THE HOME.  02 TANKS STORED SAFELY IN RACK AND HOLDER  D/W FAMILY RE:RECOMMEND FWW U

## 2021-03-10 ASSESSMENT — FIBROSIS 4 INDEX: FIB4 SCORE: 1.82

## 2021-03-10 ASSESSMENT — ENCOUNTER SYMPTOMS: DIFFICULTY THINKING: 1

## 2021-03-10 NOTE — TELEPHONE ENCOUNTER
145/84 @1730 medication given at 1830. BP at 2110 was 89/57 L, 82/53 R (both arms) to include coreg, metformin, lipitor,and eliquis. Patient clammy, sweating, groggy ready to pass out.  Slept most of the day. Called the advice nurse     0640 129/69 0900 129/60 without any medication.    Advised that HF patients do need a lower BP to help protect the heart, Advised to try taking 1/2 tablet of both coreg and losartan along with 12.5mg of spironolactone. Advised to hold off on lasix as patient is hardly drinking and peeing indicating she is dehydrated.     Elana is to call back with BP in two hours.     Educated that if BP does drop low again and patient is symptomatic to encourage more fluids and to lay patient down with legs elevated.

## 2021-03-11 ENCOUNTER — HOME CARE VISIT (OUTPATIENT)
Dept: HOME HEALTH SERVICES | Facility: HOME HEALTHCARE | Age: 86
End: 2021-03-11
Payer: MEDICARE

## 2021-03-11 ENCOUNTER — ANTICOAGULATION MONITORING (OUTPATIENT)
Dept: MEDICAL GROUP | Facility: PHYSICIAN GROUP | Age: 86
End: 2021-03-11

## 2021-03-11 VITALS
SYSTOLIC BLOOD PRESSURE: 112 MMHG | OXYGEN SATURATION: 97 % | HEART RATE: 84 BPM | TEMPERATURE: 98 F | RESPIRATION RATE: 18 BRPM | DIASTOLIC BLOOD PRESSURE: 58 MMHG

## 2021-03-11 VITALS
RESPIRATION RATE: 18 BRPM | SYSTOLIC BLOOD PRESSURE: 132 MMHG | BODY MASS INDEX: 22.07 KG/M2 | OXYGEN SATURATION: 98 % | TEMPERATURE: 98 F | HEART RATE: 84 BPM | DIASTOLIC BLOOD PRESSURE: 62 MMHG | WEIGHT: 124.6 LBS

## 2021-03-11 PROCEDURE — G0495 RN CARE TRAIN/EDU IN HH: HCPCS

## 2021-03-11 PROCEDURE — G0155 HHCP-SVS OF CSW,EA 15 MIN: HCPCS

## 2021-03-11 PROCEDURE — G0152 HHCP-SERV OF OT,EA 15 MIN: HCPCS

## 2021-03-11 SDOH — ECONOMIC STABILITY: HOUSING INSECURITY: EVIDENCE OF SMOKING MATERIAL: 0

## 2021-03-11 SDOH — ECONOMIC STABILITY: HOUSING INSECURITY: HOME SAFETY: PT/FAMILY REPORT NO CHANGES TO MEDICATIONS AND NO FALLS SINCE LAST HH VISIT.

## 2021-03-11 ASSESSMENT — ENCOUNTER SYMPTOMS: DYSPNEA WITH EXERTION: 1

## 2021-03-11 ASSESSMENT — FIBROSIS 4 INDEX: FIB4 SCORE: 1.82

## 2021-03-11 NOTE — PROGRESS NOTES
Medication chart review for [facility] services    PCP:  Deepti Uribe M.D.  [address]  [place] [address]  Fax: [phone]    Current medication list     Current Outpatient Medications:   •  acetaminophen, 500 mg, Oral, Q6HRS PRN  •  Home Care Oxygen, 1 L/min, Inhalation, Continuous  •  carvedilol, 6.25 mg, Oral, BID WITH MEALS  •  losartan, 50 mg, Oral, DAILY  •  oyster shell calcium/vitamin D, 1 tablet, Oral, DAILY  •  atorvastatin, 20 mg, Oral, Q EVENING  •  metFORMIN, 1,000 mg, Oral, BID WITH MEALS  •  spironolactone, 12.5 mg, Oral, DAILY  •  vitamin D, 1,000 Units, Oral, DAILY  •  apixaban, 2.5 mg, Oral, BID  •  SITagliptin, 100 mg, Oral, DAILY  •  thiamine, 100 mg, Oral, DAILY  •  furosemide, 20 mg, Oral, DAILY  •  Iron-Vit C-Vit B12-Folic Acid (IRON 100 PLUS PO), Take  by mouth.    Allergies   Allergen Reactions   • Ciprofloxacin Hcl    • Donepezil    • Hydrochlorothiazide    • Sulfa Drugs        Labs / Images Reviewed:     Lab Results   Component Value Date/Time    SODIUM 130 (L) 03/09/2021 11:30 AM    POTASSIUM 4.5 03/09/2021 11:30 AM    CHLORIDE 94 (L) 03/09/2021 11:30 AM    CO2 25 03/09/2021 11:30 AM    GLUCOSE 110 (H) 03/09/2021 11:30 AM    BUN 26 (H) 03/09/2021 11:30 AM    CREATININE 0.65 03/09/2021 11:30 AM      Lab Results   Component Value Date/Time    ALKPHOSPHAT 117 (H) 03/09/2021 11:30 AM    ASTSGOT 11 (L) 03/09/2021 11:30 AM    ALTSGPT 8 03/09/2021 11:30 AM    TBILIRUBIN 0.4 03/09/2021 11:30 AM    ALBUMIN 3.2 03/09/2021 11:30 AM    ALBUMIN 3.58 (L) 03/06/2021 01:57 PM          Potential drug interactions:     Drug-Drug: spironolactone and losartan  The risk of hyperkalemia may be increased when potassium-sparing diuretics are co-administered with angiotensin II receptor antagonists.      Assessment and Plan:   • Continue to monitor electrolytes and liver function          Jacques Poe, PharmD, MS, BCACP, LCC  [facility]  Phone [phone] fax [phone]    This note was created using voice recognition software (Dragon). The accuracy of the dictation is limited by the abilities of the software. I have reviewed the note prior to signing, however some errors in grammar and context are still possible. If you have any questions related to this note please do not hesitate to contact our office.

## 2021-03-12 ENCOUNTER — HOME CARE VISIT (OUTPATIENT)
Dept: HOME HEALTH SERVICES | Facility: HOME HEALTHCARE | Age: 86
End: 2021-03-12
Payer: MEDICARE

## 2021-03-12 VITALS
HEART RATE: 87 BPM | SYSTOLIC BLOOD PRESSURE: 137 MMHG | DIASTOLIC BLOOD PRESSURE: 61 MMHG | WEIGHT: 124.6 LBS | OXYGEN SATURATION: 99 % | BODY MASS INDEX: 22.07 KG/M2 | TEMPERATURE: 96.9 F | RESPIRATION RATE: 17 BRPM

## 2021-03-12 PROCEDURE — G0151 HHCP-SERV OF PT,EA 15 MIN: HCPCS

## 2021-03-12 SDOH — ECONOMIC STABILITY: HOUSING INSECURITY: EVIDENCE OF SMOKING MATERIAL: 0

## 2021-03-12 SDOH — ECONOMIC STABILITY: HOUSING INSECURITY
HOME SAFETY: FIRE ESCAPE PLAN DEVELOPED. PATIENT DOES NOT HAVE FLAMMABLE MATERIALS PRESENT IN THE HOME PRESENTING A FIRE HAZARD. NO EVIDENCE FOUND OF SMOKING MATERIALS PRESENT IN THE HOME.

## 2021-03-12 ASSESSMENT — FIBROSIS 4 INDEX: FIB4 SCORE: 1.82

## 2021-03-13 ENCOUNTER — HOME CARE VISIT (OUTPATIENT)
Dept: HOME HEALTH SERVICES | Facility: HOME HEALTHCARE | Age: 86
End: 2021-03-13
Payer: MEDICARE

## 2021-03-13 VITALS
HEART RATE: 88 BPM | DIASTOLIC BLOOD PRESSURE: 63 MMHG | RESPIRATION RATE: 16 BRPM | TEMPERATURE: 99.1 F | BODY MASS INDEX: 21.79 KG/M2 | WEIGHT: 123 LBS | SYSTOLIC BLOOD PRESSURE: 132 MMHG | OXYGEN SATURATION: 99 %

## 2021-03-13 PROCEDURE — G0493 RN CARE EA 15 MIN HH/HOSPICE: HCPCS

## 2021-03-13 ASSESSMENT — ENCOUNTER SYMPTOMS
SHORTNESS OF BREATH: T
NAUSEA: DENIES
MUSCLE WEAKNESS: 1
VOMITING: DENIES

## 2021-03-13 ASSESSMENT — FIBROSIS 4 INDEX: FIB4 SCORE: 1.82

## 2021-03-13 ASSESSMENT — ACTIVITIES OF DAILY LIVING (ADL)
AMBULATION ASSISTANCE: STAND BY ASSIST
CURRENT_FUNCTION: STAND BY ASSIST

## 2021-03-14 ENCOUNTER — HOME CARE VISIT (OUTPATIENT)
Dept: HOME HEALTH SERVICES | Facility: HOME HEALTHCARE | Age: 86
End: 2021-03-14
Payer: MEDICARE

## 2021-03-14 VITALS
TEMPERATURE: 98 F | HEART RATE: 95 BPM | RESPIRATION RATE: 16 BRPM | DIASTOLIC BLOOD PRESSURE: 75 MMHG | SYSTOLIC BLOOD PRESSURE: 130 MMHG

## 2021-03-14 VITALS
HEART RATE: 70 BPM | TEMPERATURE: 98.1 F | DIASTOLIC BLOOD PRESSURE: 62 MMHG | RESPIRATION RATE: 18 BRPM | SYSTOLIC BLOOD PRESSURE: 111 MMHG | OXYGEN SATURATION: 98 %

## 2021-03-14 PROCEDURE — G0153 HHCP-SVS OF S/L PATH,EA 15MN: HCPCS

## 2021-03-14 ASSESSMENT — ACTIVITIES OF DAILY LIVING (ADL)
BATHING_CURRENT_FUNCTION: MAXIMUM ASSIST
AMBULATION ASSISTANCE: SUPERVISION
SHOPPING: DEPENDENT
ORAL_CARE_CURRENT_FUNCTION: INDEPENDENT
TELEPHONE USE ASSESSED: 1
BATHING ASSESSED: 1
GROOMING ASSESSED: 1
TOILETING: 1
TOILETING: MAXIMUM ASSIST
ORAL_CARE_ASSESSED: 1
FEEDING: INDEPENDENT
DRESSING_LB_CURRENT_FUNCTION: MAXIMUM ASSIST
DRESSING_UB_CURRENT_FUNCTION: MAXIMUM ASSIST
TRANSPORTATION ASSESSED: 1
PREPARING MEALS: DEPENDENT
HOUSEKEEPING ASSESSED: 1
SHOPPING ASSESSED: 1
LIGHT HOUSEKEEPING: DEPENDENT
LAUNDRY: DEPENDENT
AMBULATION ASSISTANCE: 1
LAUNDRY ASSESSED: 1
FEEDING ASSESSED: 1
GROOMING_CURRENT_FUNCTION: MINIMUM ASSIST
USING THE TELPHONE: DEPENDENT
TRANSPORTATION: DEPENDENT

## 2021-03-14 ASSESSMENT — ENCOUNTER SYMPTOMS
POOR JUDGMENT: 1
DIFFICULTY THINKING: 1

## 2021-03-15 ENCOUNTER — HOME CARE VISIT (OUTPATIENT)
Dept: HOME HEALTH SERVICES | Facility: HOME HEALTHCARE | Age: 86
End: 2021-03-15
Payer: MEDICARE

## 2021-03-15 VITALS
HEART RATE: 89 BPM | DIASTOLIC BLOOD PRESSURE: 68 MMHG | TEMPERATURE: 97.4 F | RESPIRATION RATE: 16 BRPM | OXYGEN SATURATION: 97 % | SYSTOLIC BLOOD PRESSURE: 108 MMHG

## 2021-03-15 PROCEDURE — G0495 RN CARE TRAIN/EDU IN HH: HCPCS

## 2021-03-15 SDOH — ECONOMIC STABILITY: HOUSING INSECURITY: EVIDENCE OF SMOKING MATERIAL: 0

## 2021-03-15 ASSESSMENT — ENCOUNTER SYMPTOMS
MUSCLE WEAKNESS: 1
VOMITING: DENIES
NAUSEA: DENIES

## 2021-03-15 ASSESSMENT — ACTIVITIES OF DAILY LIVING (ADL)
AMBULATION ASSISTANCE: STAND BY ASSIST
CURRENT_FUNCTION: STAND BY ASSIST

## 2021-03-16 ENCOUNTER — HOME CARE VISIT (OUTPATIENT)
Dept: HOME HEALTH SERVICES | Facility: HOME HEALTHCARE | Age: 86
End: 2021-03-16
Payer: MEDICARE

## 2021-03-16 ENCOUNTER — TELEMEDICINE (OUTPATIENT)
Dept: MEDICAL GROUP | Facility: MEDICAL CENTER | Age: 86
End: 2021-03-16
Payer: MEDICARE

## 2021-03-16 VITALS — HEIGHT: 63 IN | TEMPERATURE: 98.6 F | WEIGHT: 128 LBS | RESPIRATION RATE: 14 BRPM | BODY MASS INDEX: 22.68 KG/M2

## 2021-03-16 VITALS
HEART RATE: 73 BPM | RESPIRATION RATE: 17 BRPM | OXYGEN SATURATION: 99 % | SYSTOLIC BLOOD PRESSURE: 119 MMHG | TEMPERATURE: 97.5 F | WEIGHT: 122.6 LBS | DIASTOLIC BLOOD PRESSURE: 58 MMHG | BODY MASS INDEX: 21.72 KG/M2

## 2021-03-16 DIAGNOSIS — I50.43 ACUTE ON CHRONIC COMBINED SYSTOLIC AND DIASTOLIC CONGESTIVE HEART FAILURE (HCC): ICD-10-CM

## 2021-03-16 DIAGNOSIS — F03.90 DEMENTIA WITHOUT BEHAVIORAL DISTURBANCE, UNSPECIFIED DEMENTIA TYPE: ICD-10-CM

## 2021-03-16 DIAGNOSIS — E87.1 HYPONATREMIA: ICD-10-CM

## 2021-03-16 DIAGNOSIS — I10 ESSENTIAL HYPERTENSION: ICD-10-CM

## 2021-03-16 PROCEDURE — 99214 OFFICE O/P EST MOD 30 MIN: CPT | Mod: 95,CR | Performed by: FAMILY MEDICINE

## 2021-03-16 PROCEDURE — G0151 HHCP-SERV OF PT,EA 15 MIN: HCPCS

## 2021-03-16 SDOH — ECONOMIC STABILITY: HOUSING INSECURITY: HOME SAFETY: PCP

## 2021-03-16 SDOH — ECONOMIC STABILITY: HOUSING INSECURITY
HOME SAFETY: FIRE ESCAPE PLAN DEVELOPED. PATIENT DOES NOT HAVE FLAMMABLE MATERIALS PRESENT IN THE HOME PRESENTING A FIRE HAZARD. NO EVIDENCE FOUND OF SMOKING MATERIALS PRESENT IN THE HOME.    SON AGREES TO KEEP PT ON 1L CONTINUOUS 02 PENDING ANY ORDER CHANGE PER

## 2021-03-16 SDOH — ECONOMIC STABILITY: HOUSING INSECURITY: EVIDENCE OF SMOKING MATERIAL: 0

## 2021-03-16 ASSESSMENT — FIBROSIS 4 INDEX
FIB4 SCORE: 1.82
FIB4 SCORE: 1.82

## 2021-03-16 NOTE — Clinical Note
Quality Review for 3/9/21 SOC OASIS by DAQUAN Chacon, RN on  March 16, 2021    Edits completed by DAQUAN Chacon RN:  1. Flu season is in effect, pt has not had a vaccine that is documented  2. Per narrative that patient needs moderate assistance and a walker for safety, the following changes were made:  is 2; [id number] I, J, K, N, O are 3  3.  is 3 per ambulation status  4.  is 13  5. Safety measures checked ambulate only with assistance  6. Completed F2F information  7. Added DM to the care plan and checked that you taught foot care per narrative

## 2021-03-16 NOTE — ASSESSMENT & PLAN NOTE
Chronic problem.  Her son reports that he has noted some worsening in her memory.  She does require help with most of her IADLs and ADLs.

## 2021-03-16 NOTE — PROGRESS NOTES
Virtual Visit: Established Patient   This visit was conducted via Zoom using secure and encrypted videoconferencing technology. The patient was in a private location in the state of [place].    The patient's identity was confirmed and verbal consent was obtained for this virtual visit.    Subjective:   CC:   Chief Complaint   Patient presents with   • Paperwork       Yoon Richards is a 89 y.o. female presenting for evaluation and management of:    Acute exacerbation of CHF (congestive heart failure) (HCC)  Chronic problem.  The patient got established with her new cardiologist on 3/8/2021 after her most recent hospitalization.  She is now doing quite well with the change in medications.  Now on Lasix 20 mg daily, Coreg twice daily, spironolactone 12.5 mg daily.  EF 30 to 35% with grade 3 diastolic dysfunction.  Currently doing well.  Now only requiring 1 L of oxygen, mostly with exertion.    Dementia (HCC)  Chronic problem.  Her son reports that he has noted some worsening in her memory.  She does require help with most of her IADLs and ADLs.     Hyponatremia  Chronic problem.  Back on salt tablets.  Sodium was 130 on 3/9/2021.    Hypertension  Chronic problem.  Blood pressure has been doing well per the patient's son.  No longer on losartan.  Continues on spironolactone 12.5 mg, Coreg 6.25 twice daily and Lasix 20 mg daily.       ROS   Denies any recent fevers or chills. No nausea or vomiting. No chest pains or shortness of breath.     Allergies   Allergen Reactions   • Ciprofloxacin    • Ciprofloxacin Hcl    • Donepezil    • Hydrochlorothiazide    • Sulfa Drugs    • Trimethoprim        Current medicines (including changes today)  Current Outpatient Medications   Medication Sig Dispense Refill   • acetaminophen (TYLENOL) 650 MG CR tablet Take 500 mg by mouth every 6 hours as needed. Indications: Fever, Pain     • Home Care Oxygen Inhale 1 L/min continuous. Oxygen dose range: 1 L/min  Respiratory route via: Nasal  Cannula   Oxygen supplier: [facility]     • carvedilol (COREG) 6.25 MG Tab Take 1 tablet by mouth 2 times a day, with meals. 60 tablet 11   • losartan (COZAAR) 50 MG Tab Take 1 tablet by mouth every day. 30 tablet 11   • Calcium Carb-Cholecalciferol (OYSTER SHELL CALCIUM/VITAMIN D) 250-125 MG-UNIT Tab tablet Take 1 tablet by mouth every day for 30 days. 30 tablet 0   • atorvastatin (LIPITOR) 20 MG Tab Take 1 tablet by mouth every evening for 30 days. 30 tablet 0   • metFORMIN (GLUCOPHAGE) 1000 MG tablet Take 1 tablet by mouth 2 times a day, with meals for 30 days. 60 tablet 0   • spironolactone (ALDACTONE) 25 MG Tab Take 0.5 Tablets by mouth every day for 30 days. 15 tablet 0   • vitamin D (VITAMIND D3) 1000 UNIT Tab Take 1 tablet by mouth every day for 30 days. 30 tablet 0   • apixaban (ELIQUIS) 2.5mg Tab Take 1 tablet by mouth 2 Times a Day for 30 days. Indications: Thromboembolism secondary to Atrial Fibrillation 60 tablet 0   • SITagliptin (JANUVIA) 100 MG Tab Take 1 tablet by mouth every day for 30 days. 30 tablet 0   • thiamine (VITAMIN B-1) 100 MG Tab Take 100 mg by mouth every day.     • furosemide (LASIX) 20 MG Tab Take 20 mg by mouth every day.     • Iron-Vit C-Vit B12-Folic Acid (IRON 100 PLUS PO) Take  by mouth.       No current facility-administered medications for this visit.       Patient Active Problem List    Diagnosis Date Noted   • Paroxysmal A-fib ([facility]) 03/05/2021     Priority: Medium   • Hyponatremia 03/06/2021   • Dementia ([facility]) 03/06/2021   • Elevated serum globulin level 03/06/2021   • Acute exacerbation of CHF (congestive heart failure) ([facility]) 03/04/2021   • DM (diabetes mellitus) ([facility]) 03/04/2021   • Hypertension 03/04/2021   • Hyperlipidemia 03/04/2021       History reviewed. No pertinent family history.    She  has a past medical history of CAD (coronary artery disease), Chronic systolic heart failure ([facility]), Dementia ([facility]), Diabetes ([facility]), High cholesterol, and Hypertension.  She  has a  "past surgical history that includes coronary artery bypas, 4 and cholecystectomy.       Objective:   Temp 37 °C (98.6 °F) (Temporal)   Ht 1.6 m (5' 3\")   BMI 21.79 kg/m²     Physical Exam:  Constitutional: Alert, no distress, well-groomed.  Skin: No rashes in visible areas.  Eye: Round. Conjunctiva clear, lids normal. No icterus.   ENMT: Lips pink without lesions, good dentition, moist mucous membranes. Phonation normal.  Neck: No masses, no thyromegaly. Moves freely without pain.  Respiratory: Unlabored respiratory effort, no cough or audible wheeze  Psych: Alert, normal affect, normal mood.      Assessment and Plan:   The following treatment plan was discussed:     1. Acute on chronic combined systolic and diastolic congestive heart failure (HCC)  Chronic problem, patient doing much better, seen by cardiology on 3/8/2021.  Symptoms improved significantly.  Denies any shortness of breath.  No lower extremity edema currently.  We will have her follow-up in office on 4/6/2021 for close monitoring.    2. Dementia without behavioral disturbance, unspecified dementia type (HCC)  Chronic problem, has had a little bit of worsening.  She does have constant help right now with her son, also living with her daughter.  [facility] paperwork filled out for Sunday.    3. Hyponatremia  Chronic problem, back on salt tablets.  Volume status appeared normal today.  Most recent sodium level was 130 on 3/9/2021.  We will continue to monitor, follow-up appointment for 4/6/2021.    4. Essential hypertension  Chronic problem, they are allowing some permissive hypertension.  No longer on the losartan.  Plan to continue monitor.    Follow-up: No follow-ups on file.         "

## 2021-03-16 NOTE — ASSESSMENT & PLAN NOTE
Chronic problem.  The patient got established with her new cardiologist on 3/8/2021 after her most recent hospitalization.  She is now doing quite well with the change in medications.  Now on Lasix 20 mg daily, Coreg twice daily, spironolactone 12.5 mg daily.  EF 30 to 35% with grade 3 diastolic dysfunction.  Currently doing well.  Now only requiring 1 L of oxygen, mostly with exertion.

## 2021-03-16 NOTE — ASSESSMENT & PLAN NOTE
Chronic problem.  Blood pressure has been doing well per the patient's son.  No longer on losartan.  Continues on spironolactone 12.5 mg, Coreg 6.25 twice daily and Lasix 20 mg daily.

## 2021-03-17 ENCOUNTER — HOME CARE VISIT (OUTPATIENT)
Dept: HOME HEALTH SERVICES | Facility: HOME HEALTHCARE | Age: 86
End: 2021-03-17
Payer: MEDICARE

## 2021-03-17 VITALS
SYSTOLIC BLOOD PRESSURE: 121 MMHG | BODY MASS INDEX: 21.89 KG/M2 | DIASTOLIC BLOOD PRESSURE: 62 MMHG | OXYGEN SATURATION: 99 % | WEIGHT: 123.6 LBS | TEMPERATURE: 97.7 F | HEART RATE: 88 BPM | RESPIRATION RATE: 17 BRPM

## 2021-03-17 PROCEDURE — G0493 RN CARE EA 15 MIN HH/HOSPICE: HCPCS

## 2021-03-17 PROCEDURE — G0152 HHCP-SERV OF OT,EA 15 MIN: HCPCS

## 2021-03-17 ASSESSMENT — FIBROSIS 4 INDEX
FIB4 SCORE: 1.82
FIB4 SCORE: 1.82

## 2021-03-17 ASSESSMENT — ACTIVITIES OF DAILY LIVING (ADL)
AMBULATION ASSISTANCE: STAND BY ASSIST
CURRENT_FUNCTION: STAND BY ASSIST

## 2021-03-17 ASSESSMENT — ENCOUNTER SYMPTOMS
MUSCLE WEAKNESS: 1
NAUSEA: DENIES
SHORTNESS OF BREATH: T
VOMITING: DENIES

## 2021-03-17 NOTE — PROGRESS NOTES
I agree with these changes.    ----- Message -----  From: Rosalina Chacon R.N.  Sent: 3/16/2021  10:48 AM PDT  To: Tanvi Garcia R.N.      Quality Review for 3/9/21 SOC OASIS by DAQUAN Chacon, RENEE on  March 16, 2021    Edits completed by DAQUAN Chacon, RN:  1. Flu season is in effect, pt has not had a vaccine that is documented  2. Per narrative that patient needs moderate assistance and a walker for safety, the following changes were made:  is 2; [id number] I, J, K, N, O are 3  3.  is 3 per ambulation status  4.  is 13  5. Safety measures checked ambulate only with assistance  6. Completed F2F information  7. Added DM to the care plan and checked that you taught foot care per narrative

## 2021-03-18 ENCOUNTER — HOME CARE VISIT (OUTPATIENT)
Dept: HOME HEALTH SERVICES | Facility: HOME HEALTHCARE | Age: 86
End: 2021-03-18
Payer: MEDICARE

## 2021-03-18 VITALS
RESPIRATION RATE: 16 BRPM | BODY MASS INDEX: 21.75 KG/M2 | WEIGHT: 122.8 LBS | HEART RATE: 78 BPM | DIASTOLIC BLOOD PRESSURE: 61 MMHG | OXYGEN SATURATION: 100 % | SYSTOLIC BLOOD PRESSURE: 118 MMHG | TEMPERATURE: 97.8 F

## 2021-03-18 VITALS
TEMPERATURE: 98.3 F | DIASTOLIC BLOOD PRESSURE: 72 MMHG | BODY MASS INDEX: 21.89 KG/M2 | SYSTOLIC BLOOD PRESSURE: 143 MMHG | HEART RATE: 71 BPM | RESPIRATION RATE: 17 BRPM | WEIGHT: 123.6 LBS | OXYGEN SATURATION: 98 %

## 2021-03-18 PROCEDURE — G0151 HHCP-SERV OF PT,EA 15 MIN: HCPCS

## 2021-03-18 SDOH — ECONOMIC STABILITY: HOUSING INSECURITY
HOME SAFETY: FIRE ESCAPE PLAN DEVELOPED. PATIENT DOES NOT HAVE FLAMMABLE MATERIALS PRESENT IN THE HOME PRESENTING A FIRE HAZARD. NO EVIDENCE FOUND OF SMOKING MATERIALS PRESENT IN THE HOME. - PER SON  RECOMMEND LONGER 02 CORD TO REACH INSIDE HOME AS SON SPENDS EN

## 2021-03-18 SDOH — ECONOMIC STABILITY: HOUSING INSECURITY: HOME SAFETY: ERGY MOVING CORD TO ALLOW FOR ACCESS

## 2021-03-18 SDOH — ECONOMIC STABILITY: HOUSING INSECURITY: EVIDENCE OF SMOKING MATERIAL: 0

## 2021-03-18 ASSESSMENT — ENCOUNTER SYMPTOMS: DIFFICULTY THINKING: 1

## 2021-03-18 ASSESSMENT — FIBROSIS 4 INDEX: FIB4 SCORE: 1.82

## 2021-03-19 ENCOUNTER — HOME CARE VISIT (OUTPATIENT)
Dept: HOME HEALTH SERVICES | Facility: HOME HEALTHCARE | Age: 86
End: 2021-03-19
Payer: MEDICARE

## 2021-03-19 VITALS
HEART RATE: 73 BPM | OXYGEN SATURATION: 100 % | TEMPERATURE: 98.1 F | RESPIRATION RATE: 17 BRPM | SYSTOLIC BLOOD PRESSURE: 118 MMHG | DIASTOLIC BLOOD PRESSURE: 60 MMHG

## 2021-03-19 PROCEDURE — G0493 RN CARE EA 15 MIN HH/HOSPICE: HCPCS

## 2021-03-19 PROCEDURE — G0180 MD CERTIFICATION HHA PATIENT: HCPCS | Performed by: FAMILY MEDICINE

## 2021-03-19 PROCEDURE — G0152 HHCP-SERV OF OT,EA 15 MIN: HCPCS

## 2021-03-19 ASSESSMENT — FIBROSIS 4 INDEX: FIB4 SCORE: 2.05

## 2021-03-19 ASSESSMENT — ACTIVITIES OF DAILY LIVING (ADL)
AMBULATION ASSISTANCE: STAND BY ASSIST
CURRENT_FUNCTION: STAND BY ASSIST

## 2021-03-19 ASSESSMENT — ENCOUNTER SYMPTOMS
POOR JUDGMENT: 1
MUSCLE WEAKNESS: 1
DIFFICULTY THINKING: 1

## 2021-03-20 ENCOUNTER — HOME CARE VISIT (OUTPATIENT)
Dept: HOME HEALTH SERVICES | Facility: HOME HEALTHCARE | Age: 86
End: 2021-03-20
Payer: MEDICARE

## 2021-03-20 ENCOUNTER — HOSPITAL ENCOUNTER (OUTPATIENT)
Facility: MEDICAL CENTER | Age: 86
End: 2021-03-20
Attending: FAMILY MEDICINE
Payer: MEDICARE

## 2021-03-20 LAB
ALBUMIN SERPL BCP-MCNC: 3.5 G/DL (ref 3.2–4.9)
ALBUMIN/GLOB SERPL: 0.8 G/DL
ALP SERPL-CCNC: 110 U/L (ref 30–99)
ALT SERPL-CCNC: 15 U/L (ref 2–50)
ANION GAP SERPL CALC-SCNC: 9 MMOL/L (ref 7–16)
APPEARANCE UR: CLEAR
AST SERPL-CCNC: 17 U/L (ref 12–45)
BACTERIA #/AREA URNS HPF: NEGATIVE /HPF
BILIRUB SERPL-MCNC: 0.2 MG/DL (ref 0.1–1.5)
BILIRUB UR QL STRIP.AUTO: NEGATIVE
BUN SERPL-MCNC: 18 MG/DL (ref 8–22)
CALCIUM SERPL-MCNC: 9.7 MG/DL (ref 8.5–10.5)
CHLORIDE SERPL-SCNC: 95 MMOL/L (ref 96–112)
CO2 SERPL-SCNC: 24 MMOL/L (ref 20–33)
COLOR UR: YELLOW
CREAT SERPL-MCNC: 0.58 MG/DL (ref 0.5–1.4)
EPI CELLS #/AREA URNS HPF: NEGATIVE /HPF
GLOBULIN SER CALC-MCNC: 4.4 G/DL (ref 1.9–3.5)
GLUCOSE SERPL-MCNC: 109 MG/DL (ref 65–99)
GLUCOSE UR STRIP.AUTO-MCNC: NEGATIVE MG/DL
HYALINE CASTS #/AREA URNS LPF: NORMAL /LPF
KETONES UR STRIP.AUTO-MCNC: NEGATIVE MG/DL
LEUKOCYTE ESTERASE UR QL STRIP.AUTO: NEGATIVE
MICRO URNS: ABNORMAL
NITRITE UR QL STRIP.AUTO: NEGATIVE
PH UR STRIP.AUTO: 5.5 [PH] (ref 5–8)
POTASSIUM SERPL-SCNC: 5.1 MMOL/L (ref 3.6–5.5)
PROT SERPL-MCNC: 7.9 G/DL (ref 6–8.2)
PROT UR QL STRIP: 30 MG/DL
RBC # URNS HPF: NORMAL /HPF
RBC UR QL AUTO: NEGATIVE
SODIUM SERPL-SCNC: 128 MMOL/L (ref 135–145)
SP GR UR STRIP.AUTO: 1.01
UROBILINOGEN UR STRIP.AUTO-MCNC: 0.2 MG/DL
WBC #/AREA URNS HPF: NORMAL /HPF

## 2021-03-20 PROCEDURE — G0299 HHS/HOSPICE OF RN EA 15 MIN: HCPCS

## 2021-03-20 PROCEDURE — 81001 URINALYSIS AUTO W/SCOPE: CPT

## 2021-03-20 PROCEDURE — 87086 URINE CULTURE/COLONY COUNT: CPT

## 2021-03-20 PROCEDURE — 80053 COMPREHEN METABOLIC PANEL: CPT

## 2021-03-21 VITALS
TEMPERATURE: 98.1 F | OXYGEN SATURATION: 100 % | HEART RATE: 73 BPM | SYSTOLIC BLOOD PRESSURE: 118 MMHG | DIASTOLIC BLOOD PRESSURE: 60 MMHG | BODY MASS INDEX: 21.75 KG/M2 | RESPIRATION RATE: 17 BRPM | WEIGHT: 122.8 LBS

## 2021-03-21 ASSESSMENT — ENCOUNTER SYMPTOMS
POOR JUDGMENT: 1
DIFFICULTY THINKING: 1

## 2021-03-22 ENCOUNTER — HOME CARE VISIT (OUTPATIENT)
Dept: HOME HEALTH SERVICES | Facility: HOME HEALTHCARE | Age: 86
End: 2021-03-22
Payer: MEDICARE

## 2021-03-22 VITALS
TEMPERATURE: 96.7 F | BODY MASS INDEX: 23.33 KG/M2 | HEART RATE: 94 BPM | OXYGEN SATURATION: 98 % | SYSTOLIC BLOOD PRESSURE: 110 MMHG | HEIGHT: 61 IN | RESPIRATION RATE: 18 BRPM | WEIGHT: 123.6 LBS | DIASTOLIC BLOOD PRESSURE: 72 MMHG

## 2021-03-22 VITALS
OXYGEN SATURATION: 18 % | TEMPERATURE: 98.6 F | DIASTOLIC BLOOD PRESSURE: 72 MMHG | HEART RATE: 82 BPM | SYSTOLIC BLOOD PRESSURE: 110 MMHG | RESPIRATION RATE: 18 BRPM

## 2021-03-22 VITALS — WEIGHT: 123.6 LBS | BODY MASS INDEX: 23.35 KG/M2 | TEMPERATURE: 98.6 F | HEART RATE: 81 BPM

## 2021-03-22 LAB
BACTERIA UR CULT: NORMAL
SIGNIFICANT IND 70042: NORMAL
SITE SITE: NORMAL
SOURCE SOURCE: NORMAL

## 2021-03-22 PROCEDURE — G0270 MNT SUBS TX FOR CHANGE DX: HCPCS

## 2021-03-22 PROCEDURE — G0493 RN CARE EA 15 MIN HH/HOSPICE: HCPCS

## 2021-03-22 PROCEDURE — G0151 HHCP-SERV OF PT,EA 15 MIN: HCPCS

## 2021-03-22 SDOH — ECONOMIC STABILITY: HOUSING INSECURITY: EVIDENCE OF SMOKING MATERIAL: 0

## 2021-03-22 SDOH — ECONOMIC STABILITY: HOUSING INSECURITY
HOME SAFETY: FIRE ESCAPE PLAN DEVELOPED. PATIENT DOES NOT HAVE FLAMMABLE MATERIALS PRESENT IN THE HOME PRESENTING A FIRE HAZARD. NO EVIDENCE FOUND OF SMOKING MATERIALS PRESENT IN THE HOME. - PER SON

## 2021-03-22 SDOH — ECONOMIC STABILITY: HOUSING INSECURITY
HOME SAFETY: FALL RISK OF 8 PER SOC RN CHARTING.  PATIENT WEARING 1L OF OXYGEN VIA NASAL CANNULA.  PATIENT AND SON DENY CHANGES TO MEDICATION LIST.  STILL HAS NOT OBTAINED ANTIBIOTIC FOR POSSIBLE UTI AT THIS TIME.

## 2021-03-22 ASSESSMENT — ENCOUNTER SYMPTOMS
VOMITING: DENIES
NAUSEA: DENIES

## 2021-03-22 ASSESSMENT — FIBROSIS 4 INDEX
FIB4 SCORE: 2.05
FIB4 SCORE: 2.05

## 2021-03-22 ASSESSMENT — ACTIVITIES OF DAILY LIVING (ADL): TRANSPORTATION COMMENTS: VIA WALKER W/ A

## 2021-03-23 ENCOUNTER — HOME CARE VISIT (OUTPATIENT)
Dept: HOME HEALTH SERVICES | Facility: HOME HEALTHCARE | Age: 86
End: 2021-03-23
Payer: MEDICARE

## 2021-03-23 VITALS
TEMPERATURE: 98.2 F | DIASTOLIC BLOOD PRESSURE: 65 MMHG | SYSTOLIC BLOOD PRESSURE: 125 MMHG | OXYGEN SATURATION: 99 % | HEART RATE: 66 BPM | RESPIRATION RATE: 1 BRPM

## 2021-03-23 SDOH — ECONOMIC STABILITY: HOUSING INSECURITY: EVIDENCE OF SMOKING MATERIAL: 0

## 2021-03-23 ASSESSMENT — ENCOUNTER SYMPTOMS
MUSCLE WEAKNESS: 1
VOMITING: DENIES
NAUSEA: DENIES

## 2021-03-23 ASSESSMENT — ACTIVITIES OF DAILY LIVING (ADL)
CURRENT_FUNCTION: ONE PERSON
AMBULATION ASSISTANCE: STAND BY ASSIST

## 2021-03-24 ENCOUNTER — HOME CARE VISIT (OUTPATIENT)
Dept: HOME HEALTH SERVICES | Facility: HOME HEALTHCARE | Age: 86
End: 2021-03-24
Payer: MEDICARE

## 2021-03-24 VITALS
OXYGEN SATURATION: 99 % | BODY MASS INDEX: 23.48 KG/M2 | TEMPERATURE: 97.2 F | WEIGHT: 124.25 LBS | SYSTOLIC BLOOD PRESSURE: 118 MMHG | HEART RATE: 87 BPM | DIASTOLIC BLOOD PRESSURE: 62 MMHG | RESPIRATION RATE: 16 BRPM

## 2021-03-24 PROCEDURE — G0152 HHCP-SERV OF OT,EA 15 MIN: HCPCS

## 2021-03-24 PROCEDURE — G0493 RN CARE EA 15 MIN HH/HOSPICE: HCPCS

## 2021-03-24 SDOH — ECONOMIC STABILITY: HOUSING INSECURITY: EVIDENCE OF SMOKING MATERIAL: 0

## 2021-03-24 SDOH — ECONOMIC STABILITY: HOUSING INSECURITY
HOME SAFETY: FIRE ESCAPE PLAN DEVELOPED. PATIENT INSTRUCTED PATIENT/CAREGIVER TO REMOVE THE FLAMMABLE MATERIALS AS SOON AS POSSIBLE. NO EVIDENCE FOUND OF SMOKING MATERIALS PRESENT IN THE HOME.

## 2021-03-24 ASSESSMENT — ENCOUNTER SYMPTOMS
MUSCLE WEAKNESS: 1
VOMITING: DENIES
LIMITED RANGE OF MOTION: 1
DIFFICULTY THINKING: 1
NAUSEA: DENIES

## 2021-03-24 ASSESSMENT — FIBROSIS 4 INDEX: FIB4 SCORE: 2.05

## 2021-03-25 ENCOUNTER — HOME CARE VISIT (OUTPATIENT)
Dept: HOME HEALTH SERVICES | Facility: HOME HEALTHCARE | Age: 86
End: 2021-03-25
Payer: MEDICARE

## 2021-03-25 VITALS
HEART RATE: 78 BPM | BODY MASS INDEX: 22.94 KG/M2 | DIASTOLIC BLOOD PRESSURE: 50 MMHG | TEMPERATURE: 97.8 F | WEIGHT: 121.4 LBS | OXYGEN SATURATION: 94 % | SYSTOLIC BLOOD PRESSURE: 117 MMHG | RESPIRATION RATE: 17 BRPM

## 2021-03-25 PROCEDURE — G0151 HHCP-SERV OF PT,EA 15 MIN: HCPCS

## 2021-03-25 SDOH — ECONOMIC STABILITY: HOUSING INSECURITY
HOME SAFETY: FIRE ESCAPE PLAN DEVELOPED. PATIENT DOES NOT HAVE FLAMMABLE MATERIALS PRESENT IN THE HOME PRESENTING A FIRE HAZARD. NO EVIDENCE FOUND OF SMOKING MATERIALS PRESENT IN THE HOME. -PER SON

## 2021-03-25 SDOH — ECONOMIC STABILITY: HOUSING INSECURITY: EVIDENCE OF SMOKING MATERIAL: 0

## 2021-03-25 ASSESSMENT — ENCOUNTER SYMPTOMS
APPETITE LEVEL: FAIR
CHANGE IN APPETITE: DECREASED

## 2021-03-25 ASSESSMENT — FIBROSIS 4 INDEX: FIB4 SCORE: 2.05

## 2021-03-25 NOTE — PROGRESS NOTES
Family requested discharge from Speech Language Pathology/Dysphagia services.  They feel goals have been met with appropriate training for family to assume care.  Please discharge from this service.  Thank you,  Mi Malcolm, YUNG/SLP

## 2021-03-26 ENCOUNTER — HOME CARE VISIT (OUTPATIENT)
Dept: HOME HEALTH SERVICES | Facility: HOME HEALTHCARE | Age: 86
End: 2021-03-26
Payer: MEDICARE

## 2021-03-26 VITALS
TEMPERATURE: 98.4 F | DIASTOLIC BLOOD PRESSURE: 60 MMHG | HEART RATE: 88 BPM | SYSTOLIC BLOOD PRESSURE: 118 MMHG | OXYGEN SATURATION: 98 % | RESPIRATION RATE: 18 BRPM

## 2021-03-26 PROCEDURE — G0152 HHCP-SERV OF OT,EA 15 MIN: HCPCS

## 2021-03-26 ASSESSMENT — ENCOUNTER SYMPTOMS
POOR JUDGMENT: 1
DIFFICULTY THINKING: 1

## 2021-03-26 ASSESSMENT — FIBROSIS 4 INDEX: FIB4 SCORE: 2.05

## 2021-03-27 ENCOUNTER — HOME CARE VISIT (OUTPATIENT)
Dept: HOME HEALTH SERVICES | Facility: HOME HEALTHCARE | Age: 86
End: 2021-03-27
Payer: MEDICARE

## 2021-03-27 VITALS
OXYGEN SATURATION: 98 % | SYSTOLIC BLOOD PRESSURE: 132 MMHG | TEMPERATURE: 97.8 F | DIASTOLIC BLOOD PRESSURE: 78 MMHG | BODY MASS INDEX: 23.2 KG/M2 | HEART RATE: 82 BPM | RESPIRATION RATE: 17 BRPM | WEIGHT: 122.8 LBS

## 2021-03-27 PROCEDURE — G0495 RN CARE TRAIN/EDU IN HH: HCPCS

## 2021-03-27 SDOH — ECONOMIC STABILITY: HOUSING INSECURITY: EVIDENCE OF SMOKING MATERIAL: 0

## 2021-03-27 ASSESSMENT — ENCOUNTER SYMPTOMS
SHORTNESS OF BREATH: T
MUSCLE WEAKNESS: 1
NAUSEA: DENIES
LIMITED RANGE OF MOTION: 1
VOMITING: DENIES

## 2021-03-27 ASSESSMENT — FIBROSIS 4 INDEX: FIB4 SCORE: 2.05

## 2021-03-28 VITALS
SYSTOLIC BLOOD PRESSURE: 123 MMHG | WEIGHT: 123 LBS | TEMPERATURE: 98.3 F | BODY MASS INDEX: 23.24 KG/M2 | DIASTOLIC BLOOD PRESSURE: 60 MMHG | RESPIRATION RATE: 17 BRPM | HEART RATE: 79 BPM | OXYGEN SATURATION: 98 %

## 2021-03-28 ASSESSMENT — ENCOUNTER SYMPTOMS
POOR JUDGMENT: 1
DIFFICULTY THINKING: 1

## 2021-03-29 ENCOUNTER — HOME CARE VISIT (OUTPATIENT)
Dept: HOME HEALTH SERVICES | Facility: HOME HEALTHCARE | Age: 86
End: 2021-03-29
Payer: MEDICARE

## 2021-03-29 VITALS
RESPIRATION RATE: 16 BRPM | SYSTOLIC BLOOD PRESSURE: 121 MMHG | WEIGHT: 122.8 LBS | OXYGEN SATURATION: 99 % | HEART RATE: 78 BPM | BODY MASS INDEX: 23.2 KG/M2 | DIASTOLIC BLOOD PRESSURE: 60 MMHG | TEMPERATURE: 97.9 F

## 2021-03-29 PROCEDURE — G0151 HHCP-SERV OF PT,EA 15 MIN: HCPCS

## 2021-03-29 SDOH — ECONOMIC STABILITY: HOUSING INSECURITY: EVIDENCE OF SMOKING MATERIAL: 0

## 2021-03-29 ASSESSMENT — ENCOUNTER SYMPTOMS: DIFFICULTY THINKING: 1

## 2021-03-29 ASSESSMENT — FIBROSIS 4 INDEX: FIB4 SCORE: 2.05

## 2021-03-30 ENCOUNTER — HOME CARE VISIT (OUTPATIENT)
Dept: HOME HEALTH SERVICES | Facility: HOME HEALTHCARE | Age: 86
End: 2021-03-30
Payer: MEDICARE

## 2021-03-30 VITALS
OXYGEN SATURATION: 90 % | TEMPERATURE: 97.6 F | BODY MASS INDEX: 22.94 KG/M2 | RESPIRATION RATE: 18 BRPM | HEART RATE: 83 BPM | WEIGHT: 121.4 LBS | SYSTOLIC BLOOD PRESSURE: 120 MMHG | DIASTOLIC BLOOD PRESSURE: 58 MMHG

## 2021-03-30 PROCEDURE — G0299 HHS/HOSPICE OF RN EA 15 MIN: HCPCS

## 2021-03-30 SDOH — ECONOMIC STABILITY: HOUSING INSECURITY: EVIDENCE OF SMOKING MATERIAL: 0

## 2021-03-30 ASSESSMENT — ENCOUNTER SYMPTOMS
VOMITING: DENIES
MUSCLE WEAKNESS: 1
NAUSEA: DENIES

## 2021-03-30 ASSESSMENT — ACTIVITIES OF DAILY LIVING (ADL)
AMBULATION ASSISTANCE: STAND BY ASSIST
CURRENT_FUNCTION: STAND BY ASSIST

## 2021-03-30 ASSESSMENT — FIBROSIS 4 INDEX: FIB4 SCORE: 2.05

## 2021-03-31 ENCOUNTER — HOME CARE VISIT (OUTPATIENT)
Dept: HOME HEALTH SERVICES | Facility: HOME HEALTHCARE | Age: 86
End: 2021-03-31
Payer: MEDICARE

## 2021-03-31 VITALS
WEIGHT: 120 LBS | DIASTOLIC BLOOD PRESSURE: 58 MMHG | TEMPERATURE: 97.5 F | RESPIRATION RATE: 20 BRPM | OXYGEN SATURATION: 98 % | SYSTOLIC BLOOD PRESSURE: 120 MMHG | HEART RATE: 77 BPM | BODY MASS INDEX: 22.67 KG/M2

## 2021-03-31 PROCEDURE — G0493 RN CARE EA 15 MIN HH/HOSPICE: HCPCS

## 2021-03-31 ASSESSMENT — ACTIVITIES OF DAILY LIVING (ADL)
CURRENT_FUNCTION: STAND BY ASSIST
AMBULATION ASSISTANCE: STAND BY ASSIST
CURRENT_FUNCTION: ONE PERSON

## 2021-03-31 ASSESSMENT — FIBROSIS 4 INDEX: FIB4 SCORE: 2.05

## 2021-03-31 ASSESSMENT — ENCOUNTER SYMPTOMS
VOMITING: DENIES
POOR JUDGMENT: 1
NAUSEA: DENIES

## 2021-03-31 NOTE — PROGRESS NOTES
Unable to access vein (after 2 failed attempts for prescribed labs)  April at [facility] notified and mobile lab services will draw the labs. Instructed although diarrhea has subsided S/S of dehydration apparent.  Instructed mobile lab will call prior to visit at which time have pt. drink 16 - 20 oz of water if able.

## 2021-04-01 RX ORDER — CARVEDILOL 6.25 MG/1
6.25 TABLET ORAL 2 TIMES DAILY WITH MEALS
Qty: 60 TABLET | Refills: 11 | Status: SHIPPED
Start: 2021-04-01 | End: 2021-04-07

## 2021-04-01 RX ORDER — LOSARTAN POTASSIUM 50 MG/1
50 TABLET ORAL DAILY
Qty: 30 TABLET | Refills: 11 | Status: SHIPPED
Start: 2021-04-01 | End: 2021-04-07

## 2021-04-02 ENCOUNTER — HOME CARE VISIT (OUTPATIENT)
Dept: HOME HEALTH SERVICES | Facility: HOME HEALTHCARE | Age: 86
End: 2021-04-02
Payer: MEDICARE

## 2021-04-02 ENCOUNTER — HOSPITAL ENCOUNTER (OUTPATIENT)
Facility: MEDICAL CENTER | Age: 86
End: 2021-04-02
Attending: INTERNAL MEDICINE
Payer: MEDICARE

## 2021-04-02 VITALS
SYSTOLIC BLOOD PRESSURE: 128 MMHG | WEIGHT: 121.4 LBS | RESPIRATION RATE: 17 BRPM | HEART RATE: 82 BPM | TEMPERATURE: 97.5 F | OXYGEN SATURATION: 99 % | BODY MASS INDEX: 22.94 KG/M2 | DIASTOLIC BLOOD PRESSURE: 70 MMHG

## 2021-04-02 LAB
EST. AVERAGE GLUCOSE BLD GHB EST-MCNC: 160 MG/DL
HBA1C MFR BLD: 7.2 % (ref 4–5.6)

## 2021-04-02 PROCEDURE — 83690 ASSAY OF LIPASE: CPT

## 2021-04-02 PROCEDURE — 83036 HEMOGLOBIN GLYCOSYLATED A1C: CPT

## 2021-04-02 PROCEDURE — 80053 COMPREHEN METABOLIC PANEL: CPT

## 2021-04-02 PROCEDURE — 82150 ASSAY OF AMYLASE: CPT

## 2021-04-02 PROCEDURE — 83880 ASSAY OF NATRIURETIC PEPTIDE: CPT

## 2021-04-02 PROCEDURE — G0493 RN CARE EA 15 MIN HH/HOSPICE: HCPCS

## 2021-04-02 ASSESSMENT — ENCOUNTER SYMPTOMS
VOMITING: DENIES
SHORTNESS OF BREATH: T
MUSCLE WEAKNESS: 1
NAUSEA: DENIES

## 2021-04-02 ASSESSMENT — FIBROSIS 4 INDEX: FIB4 SCORE: 2.05

## 2021-04-02 ASSESSMENT — ACTIVITIES OF DAILY LIVING (ADL)
AMBULATION ASSISTANCE: STAND BY ASSIST
CURRENT_FUNCTION: STAND BY ASSIST

## 2021-04-03 LAB
ALBUMIN SERPL BCP-MCNC: 4.1 G/DL (ref 3.2–4.9)
ALBUMIN/GLOB SERPL: 0.9 G/DL
ALP SERPL-CCNC: 116 U/L (ref 30–99)
ALT SERPL-CCNC: 17 U/L (ref 2–50)
AMYLASE SERPL-CCNC: 65 U/L (ref 20–103)
ANION GAP SERPL CALC-SCNC: 18 MMOL/L (ref 7–16)
AST SERPL-CCNC: 28 U/L (ref 12–45)
BILIRUB SERPL-MCNC: 0.3 MG/DL (ref 0.1–1.5)
BUN SERPL-MCNC: 12 MG/DL (ref 8–22)
CALCIUM SERPL-MCNC: 9.8 MG/DL (ref 8.5–10.5)
CHLORIDE SERPL-SCNC: 92 MMOL/L (ref 96–112)
CO2 SERPL-SCNC: 17 MMOL/L (ref 20–33)
CREAT SERPL-MCNC: 0.51 MG/DL (ref 0.5–1.4)
GLOBULIN SER CALC-MCNC: 4.8 G/DL (ref 1.9–3.5)
GLUCOSE SERPL-MCNC: 90 MG/DL (ref 65–99)
LIPASE SERPL-CCNC: 43 U/L (ref 11–82)
NT-PROBNP SERPL IA-MCNC: 5320 PG/ML (ref 0–125)
POTASSIUM SERPL-SCNC: 4.2 MMOL/L (ref 3.6–5.5)
PROT SERPL-MCNC: 8.9 G/DL (ref 6–8.2)
SODIUM SERPL-SCNC: 127 MMOL/L (ref 135–145)

## 2021-04-05 RX ORDER — ATORVASTATIN CALCIUM 20 MG/1
20 TABLET, FILM COATED ORAL EVERY EVENING
Qty: 90 TABLET | Refills: 3 | Status: SHIPPED | OUTPATIENT
Start: 2021-04-05 | End: 2021-05-05

## 2021-04-06 ENCOUNTER — HOME CARE VISIT (OUTPATIENT)
Dept: HOME HEALTH SERVICES | Facility: HOME HEALTHCARE | Age: 86
End: 2021-04-06
Payer: MEDICARE

## 2021-04-06 VITALS
RESPIRATION RATE: 16 BRPM | DIASTOLIC BLOOD PRESSURE: 72 MMHG | TEMPERATURE: 97.6 F | WEIGHT: 121.6 LBS | OXYGEN SATURATION: 96 % | HEART RATE: 89 BPM | SYSTOLIC BLOOD PRESSURE: 128 MMHG | BODY MASS INDEX: 22.98 KG/M2

## 2021-04-06 PROCEDURE — G0493 RN CARE EA 15 MIN HH/HOSPICE: HCPCS

## 2021-04-06 ASSESSMENT — ACTIVITIES OF DAILY LIVING (ADL)
CURRENT_FUNCTION: STAND BY ASSIST
AMBULATION ASSISTANCE: STAND BY ASSIST

## 2021-04-06 ASSESSMENT — FIBROSIS 4 INDEX: FIB4 SCORE: 3.17

## 2021-04-06 ASSESSMENT — ENCOUNTER SYMPTOMS
MUSCLE WEAKNESS: 1
NAUSEA: DENIES
DIFFICULTY THINKING: 1
SHORTNESS OF BREATH: T
VOMITING: DENIES

## 2021-04-07 ENCOUNTER — OFFICE VISIT (OUTPATIENT)
Dept: CARDIOLOGY | Facility: MEDICAL CENTER | Age: 86
End: 2021-04-07
Payer: MEDICARE

## 2021-04-07 ENCOUNTER — PATIENT MESSAGE (OUTPATIENT)
Dept: CARDIOLOGY | Facility: MEDICAL CENTER | Age: 86
End: 2021-04-07

## 2021-04-07 VITALS
WEIGHT: 122 LBS | HEIGHT: 63 IN | HEART RATE: 73 BPM | BODY MASS INDEX: 21.62 KG/M2 | OXYGEN SATURATION: 97 % | SYSTOLIC BLOOD PRESSURE: 140 MMHG | DIASTOLIC BLOOD PRESSURE: 80 MMHG

## 2021-04-07 DIAGNOSIS — R77.1 ELEVATED SERUM GLOBULIN LEVEL: ICD-10-CM

## 2021-04-07 DIAGNOSIS — I50.43 ACUTE ON CHRONIC COMBINED SYSTOLIC AND DIASTOLIC CONGESTIVE HEART FAILURE (HCC): ICD-10-CM

## 2021-04-07 DIAGNOSIS — E11.9 TYPE 2 DIABETES MELLITUS WITHOUT COMPLICATION, WITHOUT LONG-TERM CURRENT USE OF INSULIN (HCC): ICD-10-CM

## 2021-04-07 DIAGNOSIS — F03.90 DEMENTIA WITHOUT BEHAVIORAL DISTURBANCE, UNSPECIFIED DEMENTIA TYPE: ICD-10-CM

## 2021-04-07 DIAGNOSIS — I10 ESSENTIAL HYPERTENSION: ICD-10-CM

## 2021-04-07 DIAGNOSIS — E78.5 HYPERLIPIDEMIA, UNSPECIFIED HYPERLIPIDEMIA TYPE: ICD-10-CM

## 2021-04-07 DIAGNOSIS — I48.0 PAROXYSMAL A-FIB (HCC): ICD-10-CM

## 2021-04-07 DIAGNOSIS — E87.1 HYPONATREMIA: ICD-10-CM

## 2021-04-07 PROCEDURE — 99215 OFFICE O/P EST HI 40 MIN: CPT | Performed by: INTERNAL MEDICINE

## 2021-04-07 RX ORDER — CARVEDILOL 3.12 MG/1
3.12 TABLET ORAL 2 TIMES DAILY WITH MEALS
Qty: 60 TABLET | Refills: 3 | Status: SHIPPED | OUTPATIENT
Start: 2021-04-07

## 2021-04-07 RX ORDER — LOSARTAN POTASSIUM 25 MG/1
25 TABLET ORAL DAILY
Qty: 30 TABLET | Refills: 11 | Status: SHIPPED | OUTPATIENT
Start: 2021-04-07

## 2021-04-07 RX ORDER — FUROSEMIDE 20 MG/1
20 TABLET ORAL DAILY
Qty: 30 TABLET | Refills: 11
Start: 2021-04-07 | End: 2021-05-19 | Stop reason: SDUPTHER

## 2021-04-07 ASSESSMENT — ENCOUNTER SYMPTOMS
ORTHOPNEA: 0
CLAUDICATION: 0
BRUISES/BLEEDS EASILY: 0
SORE THROAT: 0
EYES NEGATIVE: 1
SHORTNESS OF BREATH: 1
WEAKNESS: 0
DIZZINESS: 0
PALPITATIONS: 0
STRIDOR: 0
GASTROINTESTINAL NEGATIVE: 1
CHILLS: 0
HEMOPTYSIS: 0
NEUROLOGICAL NEGATIVE: 1
LOSS OF CONSCIOUSNESS: 0
CARDIOVASCULAR NEGATIVE: 1
WHEEZING: 0
SPUTUM PRODUCTION: 0
PND: 0
MUSCULOSKELETAL NEGATIVE: 1
FEVER: 0
COUGH: 0
CONSTITUTIONAL NEGATIVE: 1

## 2021-04-07 ASSESSMENT — FIBROSIS 4 INDEX: FIB4 SCORE: 3.17

## 2021-04-07 NOTE — PROGRESS NOTES
Chief Complaint   Patient presents with   • Atrial Fibrillation   • Congestive Heart Failure     & Acute exacerbation of CHF (congestive heart failure) (HCC)   • Hyperlipidemia   • HTN (Controlled)       Subjective:   Yoon Richards is a 89 y.o. female who presents today as a new consultation for heart failure with reduced ejection fraction.  Since she was last seen she has been dealing with issues around hyponatremia.  She is followed by geriatrics who decided to stop her heart failure medications and put her on sodium pills as a treatment for her hyponatremia.  She continues to have PND and lower extremity edema.    Past Medical History:   Diagnosis Date   • CAD (coronary artery disease)    • Chronic systolic heart failure (HCC)    • Dementia (HCC)    • Diabetes (HCC)    • High cholesterol    • Hypertension      Past Surgical History:   Procedure Laterality Date   • CHOLECYSTECTOMY     • CORONARY ARTERY BYPAS, 4       No family history on file.  Social History     Socioeconomic History   • Marital status:      Spouse name: Not on file   • Number of children: Not on file   • Years of education: Not on file   • Highest education level: Not on file   Occupational History   • Not on file   Tobacco Use   • Smoking status: Former Smoker   • Smokeless tobacco: Never Used   Substance and Sexual Activity   • Alcohol use: Never   • Drug use: Never   • Sexual activity: Not Currently   Other Topics Concern   • Not on file   Social History Narrative   • Not on file     Social Determinants of Health     Financial Resource Strain:    • Difficulty of Paying Living Expenses:    Food Insecurity:    • Worried About Running Out of Food in the Last Year:    • Ran Out of Food in the Last Year:    Transportation Needs:    • Lack of Transportation (Medical):    • Lack of Transportation (Non-Medical):    Physical Activity:    • Days of Exercise per Week:    • Minutes of Exercise per Session:    Stress:    • Feeling of Stress :     Social Connections:    • Frequency of Communication with Friends and Family:    • Frequency of Social Gatherings with Friends and Family:    • Attends [employer] Services:    • Active Member of Clubs or Organizations:    • Attends Club or Organization Meetings:    • Marital Status:    Intimate Partner Violence:    • Fear of Current or Ex-Partner:    • Emotionally Abused:    • Physically Abused:    • Sexually Abused:      Allergies   Allergen Reactions   • Ciprofloxacin    • Ciprofloxacin Hcl    • Donepezil    • Hydrochlorothiazide    • Sulfa Drugs    • Trimethoprim      Outpatient Encounter Medications as of 4/7/2021   Medication Sig Dispense Refill   • carvedilol (COREG) 3.125 MG Tab Take 1 tablet by mouth 2 times a day with meals. 60 tablet 3   • losartan (COZAAR) 25 MG Tab Take 1 tablet by mouth every day. 30 tablet 11   • furosemide (LASIX) 20 MG Tab Take 1 tablet by mouth every day. 30 tablet 11   • apixaban (ELIQUIS) 2.5mg Tab Take 2.5 mg by mouth 2 times a day.     • atorvastatin (LIPITOR) 20 MG Tab Take 1 tablet by mouth every evening for 30 days. 90 tablet 3   • Calcium Carb-Cholecalciferol (OYSTER SHELL CALCIUM/VITAMIN D) 250-125 MG-UNIT Tab tablet Take 1 tablet by mouth every day for 30 days. 90 tablet 3   • acetaminophen (TYLENOL) 650 MG CR tablet Take 500 mg by mouth every 6 hours as needed. Indications: Fever, Pain     • Home Care Oxygen Inhale 1 L/min continuous. Oxygen dose range: 1 L/min  Respiratory route via: Nasal Cannula   Oxygen supplier: [employer]     • vitamin D (VITAMIND D3) 1000 UNIT Tab Take 1 tablet by mouth every day for 30 days. 30 tablet 0   • thiamine (VITAMIN B-1) 100 MG Tab Take 100 mg by mouth every day.     • Iron-Vit C-Vit B12-Folic Acid (IRON 100 PLUS PO) Take  by mouth.     • [DISCONTINUED] SODIUM CHLORIDE PO Take 1 g by mouth 3 times a day. Indications: low sodium     • [DISCONTINUED] carvedilol (COREG) 6.25 MG Tab Take 1 tablet by mouth 2 times a day, with meals. Pt taking  "3.125 mg by mouth 2 times a day with meals as reported by pt's daughter  on 3/21/21 60 tablet 11   • [DISCONTINUED] losartan (COZAAR) 50 MG Tab Take 1 tablet by mouth every day. pt taking half of losartan 25mg 1 tab by mouth every day as reported by daughter on 3/27/21 30 tablet 11   • [DISCONTINUED] spironolactone (ALDACTONE) 25 MG Tab Take 0.5 Tablets by mouth every day for 30 days. (Patient not taking: Reported on 4/7/2021) 15 tablet 0   • [DISCONTINUED] furosemide (LASIX) 20 MG Tab Take 20 mg by mouth every day.       No facility-administered encounter medications on file as of 4/7/2021.     Review of Systems   Constitutional: Negative.  Negative for chills, fever and malaise/fatigue.   HENT: Negative.  Negative for sore throat.    Eyes: Negative.    Respiratory: Positive for shortness of breath. Negative for cough, hemoptysis, sputum production, wheezing and stridor.    Cardiovascular: Negative.  Negative for chest pain, palpitations, orthopnea, claudication, leg swelling and PND.   Gastrointestinal: Negative.    Genitourinary: Negative.    Musculoskeletal: Negative.    Skin: Negative.    Neurological: Negative.  Negative for dizziness, loss of consciousness and weakness.   Endo/Heme/Allergies: Negative.  Does not bruise/bleed easily.   All other systems reviewed and are negative.       Objective:   /80 (BP Location: Right arm, Patient Position: Sitting, BP Cuff Size: Adult)   Pulse 73   Ht 1.6 m (5' 3\")   Wt 55.3 kg (122 lb)   SpO2 97%   BMI 21.61 kg/m²     Physical Exam   Constitutional: She appears well-developed and well-nourished. No distress.   HENT:   Head: Normocephalic and atraumatic.   Right Ear: External ear normal.   Left Ear: External ear normal.   Nose: Nose normal.   Mouth/Throat: No oropharyngeal exudate.   Eyes: Pupils are equal, round, and reactive to light. Conjunctivae and EOM are normal. Right eye exhibits no discharge. Left eye exhibits no discharge. No scleral icterus.   Neck: " No JVD present.   Cardiovascular: Normal rate, regular rhythm and intact distal pulses. Exam reveals no gallop and no friction rub.   No murmur heard.  Pulmonary/Chest: Effort normal. No stridor. No respiratory distress. She has no wheezes. She has no rales. She exhibits no tenderness.   Abdominal: Soft. She exhibits no distension. There is no guarding.   Musculoskeletal:         General: No tenderness, deformity or edema. Normal range of motion.      Cervical back: Neck supple.   Neurological: She is alert. She has normal reflexes. She displays normal reflexes. No cranial nerve deficit. She exhibits normal muscle tone. Coordination normal.   Skin: Skin is warm and dry. No rash noted. She is not diaphoretic. No erythema. No pallor.   Psychiatric: She has a normal mood and affect. Her behavior is normal. Judgment and thought content normal.   Nursing note and vitals reviewed.      Assessment:     1. Acute on chronic combined systolic and diastolic congestive heart failure (HCC)     2. Dementia without behavioral disturbance, unspecified dementia type (HCC)  carvedilol (COREG) 3.125 MG Tab    furosemide (LASIX) 20 MG Tab   3. Type 2 diabetes mellitus without complication, without long-term current use of insulin (HCC)  carvedilol (COREG) 3.125 MG Tab    losartan (COZAAR) 25 MG Tab    furosemide (LASIX) 20 MG Tab   4. Elevated serum globulin level  carvedilol (COREG) 3.125 MG Tab    losartan (COZAAR) 25 MG Tab    furosemide (LASIX) 20 MG Tab   5. Hyperlipidemia, unspecified hyperlipidemia type  carvedilol (COREG) 3.125 MG Tab    losartan (COZAAR) 25 MG Tab    furosemide (LASIX) 20 MG Tab   6. Paroxysmal A-fib (HCC)  losartan (COZAAR) 25 MG Tab   7. Essential hypertension     8. Hyponatremia         Medical Decision Making:  Today's Assessment / Status / Plan:     89-year-old female with heart failure with reduced ejection fraction likely from multivessel coronary disease.  I discussed with the daughter that the  treatment for hyponatremia is likely mediated through treating her heart failure given the elevated BNP.  Treating hyponatremia with sodium pills is contraindicated and is unlikely be successful and only leads to sodium retention which leads to further fluid retention.  I would like her to stop the sodium pills and start on a low dose of furosemide for decongestion as she is volume overloaded on exam.  We will also reduce the carvedilol and losartan down to 3 mg and 25 mg.  I encouraged her to have the geriatric physicians reach out to us should they have any questions.  In terms of end-of-life she reinforced that she wants her mom to be happy and comfortable and I think that getting her diuresed is well within the goals of care.

## 2021-04-08 ENCOUNTER — PATIENT OUTREACH (OUTPATIENT)
Dept: HEALTH INFORMATION MANAGEMENT | Facility: OTHER | Age: 86
End: 2021-04-08

## 2021-04-08 ENCOUNTER — PATIENT MESSAGE (OUTPATIENT)
Dept: CARDIOLOGY | Facility: MEDICAL CENTER | Age: 86
End: 2021-04-08

## 2021-04-08 NOTE — PROGRESS NOTES
Called patient to schedule Comprehensive Geriatric Assessment. Spoke with her daughter and she will call back if decides she wants an appointment.  Attempt 1

## 2021-04-09 ENCOUNTER — TELEPHONE (OUTPATIENT)
Dept: CARDIOLOGY | Facility: MEDICAL CENTER | Age: 86
End: 2021-04-09

## 2021-04-09 ENCOUNTER — HOME CARE VISIT (OUTPATIENT)
Dept: HOME HEALTH SERVICES | Facility: HOME HEALTHCARE | Age: 86
End: 2021-04-09
Payer: MEDICARE

## 2021-04-09 VITALS
RESPIRATION RATE: 16 BRPM | SYSTOLIC BLOOD PRESSURE: 126 MMHG | OXYGEN SATURATION: 96 % | WEIGHT: 123.6 LBS | TEMPERATURE: 98 F | DIASTOLIC BLOOD PRESSURE: 62 MMHG | BODY MASS INDEX: 21.89 KG/M2 | HEART RATE: 69 BPM

## 2021-04-09 PROCEDURE — G0152 HHCP-SERV OF OT,EA 15 MIN: HCPCS

## 2021-04-09 PROCEDURE — 665001 SOC-HOME HEALTH

## 2021-04-09 PROCEDURE — G0493 RN CARE EA 15 MIN HH/HOSPICE: HCPCS

## 2021-04-09 ASSESSMENT — FIBROSIS 4 INDEX: FIB4 SCORE: 3.17

## 2021-04-09 ASSESSMENT — ENCOUNTER SYMPTOMS
SHORTNESS OF BREATH: T
MUSCLE WEAKNESS: 1
VOMITING: DENIES
DIFFICULTY THINKING: 1
NAUSEA: DENIES

## 2021-04-09 ASSESSMENT — ACTIVITIES OF DAILY LIVING (ADL)
CURRENT_FUNCTION: STAND BY ASSIST
AMBULATION ASSISTANCE: STAND BY ASSIST

## 2021-04-09 NOTE — TELEPHONE ENCOUNTER
Spoke with patient's daughter about the goal of her HF care to lessen the workload on her mother's heart and decrease fluid retention, spoke about different things that could have caused confusion. Also spoke about goals of care and patient's quality of life. Patient's daughter said patient should be a DNR. Sent Referral for [facility] paramedicine program and requested they draw labs next week per Dr. Ferrer's orders

## 2021-04-09 NOTE — LETTER
Type of Thirty (30) Day Referral: Outpatient    Diagnosis: CHF    Patient in Hospital? NO    Patient's Current Location (including Room if applicable): home    Discharge Date (approximate if not known): NA    Referral Source:   [facility]  [address], [address]  [place] [address]  Phone:  [phone]  Fax:  [phone]    Referral Resource Name and Title: Claude Ferrer MD    Patient Name: Yoon Richards    Patient Address:   [address] [address]         Patient Phone: [phone]    Patient : 1928    Patient MRN: [id number]    Patients Insurance: Payor: SENIOR CARE PLUS / Plan: SENIOR CARE PLUS / Product Type: *No Product type* /     Emergency Contact Relationship: Extended Emergency Contact Information  Primary Emergency Contact: Joy Longo  Address: [address]  Home Phone: [phone]  Mobile Phone: [phone]  Relation: Daughter    Patients PCP: Deepti Uribe M.D.        PCP's Phone Number: [phone]    Is the Primary Care Provider aware this referral is being made?: NO    Is the patient being referred to Home Health care?: NO    Is the patient and/or family aware this referral is being made?: YES - please speak to joanne Hamilton  ~~~~~~~~~~~~~~~~~~~~~~~~~~~~~~~~~~~~~~~~~~~~~~~~~~~~~~~~~~~~~~~~

## 2021-04-09 NOTE — LETTER
There is a CBC w/diff, w/o platelets and a CMP in the computer for Dr. Ferrer that the patient's daughter Joy would like drawn when you see them next week if possible, please let me know  Thanks   Nessa- [phone]

## 2021-04-09 NOTE — PATIENT COMMUNICATION
Late documentation form 4/8/21    S/W daughter Elana regarding symptoms. Patient has underlying dementia, unclear if episode was related to dementia or hyponatremia or hypotension. Discussed medication regimen: Patient was given lasix tablet around 1200pm and then carvedilol around 630-730. Advised. During time of episode, unable to obtain pulse Ox or BP. Advised daughter that if episode re-occurs to please be sure to optain as many VS as possible. Concern daughter has is that pt normally wakes up in the middle of the night but is not disoriented like she was. Patient had already received lasix for the day and daughter will monitor and update us with overnight activity.

## 2021-04-11 VITALS
OXYGEN SATURATION: 96 % | HEART RATE: 69 BPM | TEMPERATURE: 98 F | DIASTOLIC BLOOD PRESSURE: 81 MMHG | RESPIRATION RATE: 16 BRPM | SYSTOLIC BLOOD PRESSURE: 138 MMHG

## 2021-04-11 ASSESSMENT — ENCOUNTER SYMPTOMS
POOR JUDGMENT: 1
DIFFICULTY THINKING: 1

## 2021-04-12 ENCOUNTER — HOSPITAL ENCOUNTER (OUTPATIENT)
Facility: MEDICAL CENTER | Age: 86
End: 2021-04-12
Attending: PHYSICIAN ASSISTANT
Payer: MEDICARE

## 2021-04-12 LAB
ANION GAP SERPL CALC-SCNC: 6 MMOL/L (ref 7–16)
BUN SERPL-MCNC: 12 MG/DL (ref 8–22)
CALCIUM SERPL-MCNC: 9.2 MG/DL (ref 8.5–10.5)
CHLORIDE SERPL-SCNC: 95 MMOL/L (ref 96–112)
CO2 SERPL-SCNC: 30 MMOL/L (ref 20–33)
CREAT SERPL-MCNC: 0.49 MG/DL (ref 0.5–1.4)
GLUCOSE SERPL-MCNC: 139 MG/DL (ref 65–99)
POTASSIUM SERPL-SCNC: 3.9 MMOL/L (ref 3.6–5.5)
SODIUM SERPL-SCNC: 131 MMOL/L (ref 135–145)

## 2021-04-12 PROCEDURE — 80048 BASIC METABOLIC PNL TOTAL CA: CPT

## 2021-04-14 ENCOUNTER — HOME CARE VISIT (OUTPATIENT)
Dept: HOME HEALTH SERVICES | Facility: HOME HEALTHCARE | Age: 86
End: 2021-04-14
Payer: MEDICARE

## 2021-04-14 ENCOUNTER — TELEPHONE (OUTPATIENT)
Dept: CARDIOLOGY | Facility: MEDICAL CENTER | Age: 86
End: 2021-04-14

## 2021-04-16 ENCOUNTER — HOME CARE VISIT (OUTPATIENT)
Dept: HOME HEALTH SERVICES | Facility: HOME HEALTHCARE | Age: 86
End: 2021-04-16
Payer: MEDICARE

## 2021-04-16 VITALS
OXYGEN SATURATION: 97 % | SYSTOLIC BLOOD PRESSURE: 124 MMHG | BODY MASS INDEX: 21.47 KG/M2 | RESPIRATION RATE: 17 BRPM | WEIGHT: 121.2 LBS | TEMPERATURE: 98.7 F | HEART RATE: 89 BPM | DIASTOLIC BLOOD PRESSURE: 63 MMHG

## 2021-04-16 PROCEDURE — G0493 RN CARE EA 15 MIN HH/HOSPICE: HCPCS

## 2021-04-16 ASSESSMENT — ACTIVITIES OF DAILY LIVING (ADL)
CURRENT_FUNCTION: STAND BY ASSIST
AMBULATION ASSISTANCE: STAND BY ASSIST

## 2021-04-16 ASSESSMENT — ENCOUNTER SYMPTOMS
VOMITING: DENIES
DIFFICULTY THINKING: 1
MUSCLE WEAKNESS: 1
NAUSEA: DENIES
SHORTNESS OF BREATH: T

## 2021-04-16 ASSESSMENT — FIBROSIS 4 INDEX: FIB4 SCORE: 3.17

## 2021-04-23 ENCOUNTER — HOME CARE VISIT (OUTPATIENT)
Dept: HOME HEALTH SERVICES | Facility: HOME HEALTHCARE | Age: 86
End: 2021-04-23
Payer: MEDICARE

## 2021-04-23 NOTE — CASE COMMUNICATION
SPOKE TO DAUGHTER LAST NIGHT TO SCHEDULE SNV FOR TODAY. DAUGHTER STATED PT. HAD [facility] COMING OVER TO PUT PT. ON THEIR PROGRAM AND ALSO HAD PA COMING TO SEE PT. IN THE AFTERNOON. REQUESTED TO HAVE THE NEXT SNV NEXT WEEK. WAN BULLOCK RN/SUPERVISOR, PCP, CM NOTIFIED.

## 2021-04-26 ENCOUNTER — HOSPITAL ENCOUNTER (OUTPATIENT)
Facility: MEDICAL CENTER | Age: 86
End: 2021-04-26
Attending: PHYSICIAN ASSISTANT
Payer: MEDICARE

## 2021-04-26 LAB
ALBUMIN SERPL BCP-MCNC: 3.3 G/DL (ref 3.2–4.9)
ALBUMIN/GLOB SERPL: 0.7 G/DL
ALP SERPL-CCNC: 136 U/L (ref 30–99)
ALT SERPL-CCNC: 9 U/L (ref 2–50)
ANION GAP SERPL CALC-SCNC: 7 MMOL/L (ref 7–16)
APPEARANCE UR: CLEAR
AST SERPL-CCNC: 14 U/L (ref 12–45)
BACTERIA #/AREA URNS HPF: NEGATIVE /HPF
BASOPHILS # BLD AUTO: 0.3 % (ref 0–1.8)
BASOPHILS # BLD: 0.02 K/UL (ref 0–0.12)
BILIRUB SERPL-MCNC: 0.3 MG/DL (ref 0.1–1.5)
BILIRUB UR QL STRIP.AUTO: NEGATIVE
BUN SERPL-MCNC: 15 MG/DL (ref 8–22)
CALCIUM SERPL-MCNC: 8.9 MG/DL (ref 8.5–10.5)
CHLORIDE SERPL-SCNC: 90 MMOL/L (ref 96–112)
CO2 SERPL-SCNC: 28 MMOL/L (ref 20–33)
COLOR UR: YELLOW
CREAT SERPL-MCNC: 0.51 MG/DL (ref 0.5–1.4)
EOSINOPHIL # BLD AUTO: 0.1 K/UL (ref 0–0.51)
EOSINOPHIL NFR BLD: 1.7 % (ref 0–6.9)
EPI CELLS #/AREA URNS HPF: NORMAL /HPF
ERYTHROCYTE [DISTWIDTH] IN BLOOD BY AUTOMATED COUNT: 50 FL (ref 35.9–50)
GLOBULIN SER CALC-MCNC: 4.5 G/DL (ref 1.9–3.5)
GLUCOSE SERPL-MCNC: 140 MG/DL (ref 65–99)
GLUCOSE UR STRIP.AUTO-MCNC: NEGATIVE MG/DL
HCT VFR BLD AUTO: 35.5 % (ref 37–47)
HGB BLD-MCNC: 11.2 G/DL (ref 12–16)
HYALINE CASTS #/AREA URNS LPF: NORMAL /LPF
IMM GRANULOCYTES # BLD AUTO: 0.05 K/UL (ref 0–0.11)
IMM GRANULOCYTES NFR BLD AUTO: 0.8 % (ref 0–0.9)
KETONES UR STRIP.AUTO-MCNC: NEGATIVE MG/DL
LEUKOCYTE ESTERASE UR QL STRIP.AUTO: NEGATIVE
LYMPHOCYTES # BLD AUTO: 0.55 K/UL (ref 1–4.8)
LYMPHOCYTES NFR BLD: 9.2 % (ref 22–41)
MCH RBC QN AUTO: 27.7 PG (ref 27–33)
MCHC RBC AUTO-ENTMCNC: 31.5 G/DL (ref 33.6–35)
MCV RBC AUTO: 87.9 FL (ref 81.4–97.8)
MICRO URNS: ABNORMAL
MONOCYTES # BLD AUTO: 0.57 K/UL (ref 0–0.85)
MONOCYTES NFR BLD AUTO: 9.5 % (ref 0–13.4)
NEUTROPHILS # BLD AUTO: 4.7 K/UL (ref 2–7.15)
NEUTROPHILS NFR BLD: 78.5 % (ref 44–72)
NITRITE UR QL STRIP.AUTO: NEGATIVE
NRBC # BLD AUTO: 0 K/UL
NRBC BLD-RTO: 0 /100 WBC
PH UR STRIP.AUTO: 7.5 [PH] (ref 5–8)
PLATELET # BLD AUTO: 280 K/UL (ref 164–446)
PMV BLD AUTO: 10.5 FL (ref 9–12.9)
POTASSIUM SERPL-SCNC: 4.8 MMOL/L (ref 3.6–5.5)
PROT SERPL-MCNC: 7.8 G/DL (ref 6–8.2)
PROT UR QL STRIP: 30 MG/DL
RBC # BLD AUTO: 4.04 M/UL (ref 4.2–5.4)
RBC # URNS HPF: NORMAL /HPF
RBC UR QL AUTO: NEGATIVE
SODIUM SERPL-SCNC: 125 MMOL/L (ref 135–145)
SP GR UR STRIP.AUTO: 1.01
UROBILINOGEN UR STRIP.AUTO-MCNC: 0.2 MG/DL
WBC # BLD AUTO: 6 K/UL (ref 4.8–10.8)
WBC #/AREA URNS HPF: NORMAL /HPF

## 2021-04-26 PROCEDURE — 80053 COMPREHEN METABOLIC PANEL: CPT

## 2021-04-26 PROCEDURE — 87086 URINE CULTURE/COLONY COUNT: CPT

## 2021-04-26 PROCEDURE — 81001 URINALYSIS AUTO W/SCOPE: CPT

## 2021-04-26 PROCEDURE — 85025 COMPLETE CBC W/AUTO DIFF WBC: CPT

## 2021-04-28 ENCOUNTER — HOME CARE VISIT (OUTPATIENT)
Dept: HOME HEALTH SERVICES | Facility: HOME HEALTHCARE | Age: 86
End: 2021-04-28
Payer: MEDICARE

## 2021-04-28 VITALS
BODY MASS INDEX: 21.86 KG/M2 | HEART RATE: 78 BPM | RESPIRATION RATE: 17 BRPM | WEIGHT: 123.4 LBS | SYSTOLIC BLOOD PRESSURE: 130 MMHG | OXYGEN SATURATION: 99 % | TEMPERATURE: 98.2 F | DIASTOLIC BLOOD PRESSURE: 67 MMHG

## 2021-04-28 LAB
BACTERIA UR CULT: NORMAL
SIGNIFICANT IND 70042: NORMAL
SITE SITE: NORMAL
SOURCE SOURCE: NORMAL

## 2021-04-28 PROCEDURE — G0495 RN CARE TRAIN/EDU IN HH: HCPCS

## 2021-04-28 ASSESSMENT — FIBROSIS 4 INDEX: FIB4 SCORE: 1.55

## 2021-04-28 ASSESSMENT — ENCOUNTER SYMPTOMS
NAUSEA: DENIES
VOMITING: DENIES
DIFFICULTY THINKING: 1

## 2021-05-05 ENCOUNTER — HOME CARE VISIT (OUTPATIENT)
Dept: HOME HEALTH SERVICES | Facility: HOME HEALTHCARE | Age: 86
End: 2021-05-05
Payer: MEDICARE

## 2021-05-05 PROCEDURE — G0493 RN CARE EA 15 MIN HH/HOSPICE: HCPCS

## 2021-05-05 ASSESSMENT — FIBROSIS 4 INDEX: FIB4 SCORE: 1.55

## 2021-05-06 ENCOUNTER — HOME CARE VISIT (OUTPATIENT)
Dept: HOME HEALTH SERVICES | Facility: HOME HEALTHCARE | Age: 86
End: 2021-05-06
Payer: MEDICARE

## 2021-05-06 VITALS
TEMPERATURE: 98.7 F | BODY MASS INDEX: 22.28 KG/M2 | DIASTOLIC BLOOD PRESSURE: 60 MMHG | WEIGHT: 125.8 LBS | OXYGEN SATURATION: 93 % | SYSTOLIC BLOOD PRESSURE: 128 MMHG | HEART RATE: 89 BPM | RESPIRATION RATE: 16 BRPM

## 2021-05-06 SDOH — ECONOMIC STABILITY: HOUSING INSECURITY
HOME SAFETY: FIRE ESCAPE PLAN DEVELOPED. PATIENT DOES NOT HAVE FLAMMABLE MATERIALS PRESENT IN THE HOME PRESENTING A FIRE HAZARD. NO EVIDENCE FOUND OF SMOKING MATERIALS PRESENT IN THE HOME.  PER SON, SMOKE ALARMS FUNCTIONAL WITH NEW BATTERIES.

## 2021-05-06 SDOH — ECONOMIC STABILITY: HOUSING INSECURITY: EVIDENCE OF SMOKING MATERIAL: 0

## 2021-05-06 ASSESSMENT — PATIENT HEALTH QUESTIONNAIRE - PHQ9: CLINICAL INTERPRETATION OF PHQ2 SCORE: 0

## 2021-05-06 ASSESSMENT — ENCOUNTER SYMPTOMS
VOMITING: DENIES
SHORTNESS OF BREATH: T
NAUSEA: DENIES

## 2021-05-06 ASSESSMENT — ACTIVITIES OF DAILY LIVING (ADL): OASIS_M1830: 03

## 2021-05-07 ENCOUNTER — HOME CARE VISIT (OUTPATIENT)
Dept: HOME HEALTH SERVICES | Facility: HOME HEALTHCARE | Age: 86
End: 2021-05-07
Payer: MEDICARE

## 2021-05-07 ENCOUNTER — TELEPHONE (OUTPATIENT)
Dept: MEDICAL GROUP | Facility: PHYSICIAN GROUP | Age: 86
End: 2021-05-07

## 2021-05-07 NOTE — CASE COMMUNICATION
On call report:  Received call from patient's daughter, inquiring if lab orders had been received yet for blood work and UA, states patient is fasting currently, wants to know if she should eat breakfast as she is diabetic. SN reviewed patient's chart, informed patient's daughter that a nursing visit is not scheduled today, and orders not received from provider. Patient's daughter will provide breakfast. RNCM, please followup with patient's daughter and provider on lab orders.

## 2021-05-07 NOTE — TELEPHONE ENCOUNTER
Medication chart review for [facility] services    PCP:  Deepti Uribe M.D.  [address]  [place] [address]  Fax: [phone]    Current medication list     Current Outpatient Medications:   •  Tradjenta, 5 mg, Oral, DAILY  •  carvedilol, 3.125 mg, Oral, BID WITH MEALS  •  losartan, 25 mg, Oral, DAILY  •  furosemide, 20 mg, Oral, DAILY  •  apixaban, 2.5 mg, Oral, BID  •  acetaminophen, 500 mg, Oral, Q6HRS PRN  •  Home Care Oxygen, 1 L/min, Inhalation, Continuous  •  thiamine, 100 mg, Oral, DAILY  •  Iron-Vit C-Vit B12-Folic Acid (IRON 100 PLUS PO), Take  by mouth.    Allergies   Allergen Reactions   • Ciprofloxacin    • Ciprofloxacin Hcl    • Donepezil    • Hydrochlorothiazide    • Sulfa Drugs    • Trimethoprim          Labs / Images Reviewed:     Lab Results   Component Value Date/Time    SODIUM 125 (L) 04/26/2021 11:04 AM    POTASSIUM 4.8 04/26/2021 11:04 AM    CHLORIDE 90 (L) 04/26/2021 11:04 AM    CO2 28 04/26/2021 11:04 AM    GLUCOSE 140 (H) 04/26/2021 11:04 AM    BUN 15 04/26/2021 11:04 AM    CREATININE 0.51 04/26/2021 11:04 AM      Lab Results   Component Value Date/Time    ALKPHOSPHAT 136 (H) 04/26/2021 11:04 AM    ASTSGOT 14 04/26/2021 11:04 AM    ALTSGPT 9 04/26/2021 11:04 AM    TBILIRUBIN 0.3 04/26/2021 11:04 AM    ALBUMIN 3.3 04/26/2021 11:04 AM    ALBUMIN 3.58 (L) 03/06/2021 01:57 PM            Assessment and Plan:   • Received referral from [facility]. Medications reviewed. No clinically significant interactions noted.             Jacques Poe, PharmD, MS, BCACP, C  [facility]  Phone [phone] fax [phone]    This note was created using voice recognition software (Dragon). The accuracy of the dictation is limited by the abilities of the software. I have reviewed the note prior to signing, however some errors in grammar and context are still possible. If you have any questions related to this note please do not hesitate to contact  our office.

## 2021-05-08 ENCOUNTER — HOME CARE VISIT (OUTPATIENT)
Dept: HOME HEALTH SERVICES | Facility: HOME HEALTHCARE | Age: 86
End: 2021-05-08
Payer: MEDICARE

## 2021-05-08 ENCOUNTER — HOSPITAL ENCOUNTER (OUTPATIENT)
Facility: MEDICAL CENTER | Age: 86
End: 2021-05-08
Attending: PHYSICIAN ASSISTANT
Payer: MEDICARE

## 2021-05-08 VITALS
BODY MASS INDEX: 22.25 KG/M2 | HEART RATE: 74 BPM | DIASTOLIC BLOOD PRESSURE: 64 MMHG | TEMPERATURE: 98.5 F | SYSTOLIC BLOOD PRESSURE: 108 MMHG | OXYGEN SATURATION: 97 % | RESPIRATION RATE: 18 BRPM | WEIGHT: 125.6 LBS

## 2021-05-08 LAB
APPEARANCE UR: CLEAR
BILIRUB UR QL STRIP.AUTO: NEGATIVE
COLOR UR: YELLOW
GLUCOSE UR STRIP.AUTO-MCNC: NEGATIVE MG/DL
KETONES UR STRIP.AUTO-MCNC: NEGATIVE MG/DL
LEUKOCYTE ESTERASE UR QL STRIP.AUTO: NEGATIVE
MICRO URNS: NORMAL
NITRITE UR QL STRIP.AUTO: NEGATIVE
PH UR STRIP.AUTO: 7.5 [PH] (ref 5–8)
PROT UR QL STRIP: NEGATIVE MG/DL
RBC UR QL AUTO: NEGATIVE
SP GR UR STRIP.AUTO: 1.01
UROBILINOGEN UR STRIP.AUTO-MCNC: 0.2 MG/DL

## 2021-05-08 PROCEDURE — 665003 FOLLOW UP-HOME HEALTH

## 2021-05-08 PROCEDURE — G0493 RN CARE EA 15 MIN HH/HOSPICE: HCPCS

## 2021-05-08 PROCEDURE — 81003 URINALYSIS AUTO W/O SCOPE: CPT

## 2021-05-08 ASSESSMENT — ACTIVITIES OF DAILY LIVING (ADL)
CURRENT_FUNCTION: STAND BY ASSIST
AMBULATION ASSISTANCE: STAND BY ASSIST

## 2021-05-08 ASSESSMENT — ENCOUNTER SYMPTOMS
SHORTNESS OF BREATH: T
NAUSEA: DENIES
VOMITING: DENIES
MUSCLE WEAKNESS: 1

## 2021-05-08 ASSESSMENT — FIBROSIS 4 INDEX: FIB4 SCORE: 1.55

## 2021-05-10 ENCOUNTER — HOME CARE VISIT (OUTPATIENT)
Dept: HOME HEALTH SERVICES | Facility: HOME HEALTHCARE | Age: 86
End: 2021-05-10
Payer: MEDICARE

## 2021-05-10 VITALS
OXYGEN SATURATION: 97 % | TEMPERATURE: 98 F | BODY MASS INDEX: 21.88 KG/M2 | HEART RATE: 81 BPM | SYSTOLIC BLOOD PRESSURE: 138 MMHG | DIASTOLIC BLOOD PRESSURE: 64 MMHG | WEIGHT: 123.5 LBS | RESPIRATION RATE: 18 BRPM

## 2021-05-10 PROCEDURE — G0299 HHS/HOSPICE OF RN EA 15 MIN: HCPCS

## 2021-05-10 SDOH — ECONOMIC STABILITY: HOUSING INSECURITY
HOME SAFETY: OXYGEN SAFETY RISK ASSESSMENT PERFORMED. PATIENT DOES HAVE A NO SMOKING SIGN POSTED IN THE HOME. PATIENT DOES NOT HAVE A WORKING FIRE EXTINGUISHER PRESENT IN THE HOME. SMOKE ALARMS ARE PRESENT AND FUNCTIONAL ON EACH LEVEL OF THE HOME. PATIENT DOES HA

## 2021-05-10 SDOH — ECONOMIC STABILITY: HOUSING INSECURITY
HOME SAFETY: VE A FIRE ESCAPE PLAN DEVELOPED. PATIENT DOES NOT HAVE FLAMMABLE MATERIALS PRESENT IN THE HOME PRESENTING A FIRE HAZARD. NO EVIDENCE FOUND OF SMOKING MATERIALS PRESENT IN THE HOME.

## 2021-05-10 SDOH — ECONOMIC STABILITY: HOUSING INSECURITY: EVIDENCE OF SMOKING MATERIAL: 0

## 2021-05-10 ASSESSMENT — ENCOUNTER SYMPTOMS
MUSCLE WEAKNESS: 1
SHORTNESS OF BREATH: T
SEVERE DYSPNEA: 1
LIMITED RANGE OF MOTION: 1

## 2021-05-10 ASSESSMENT — FIBROSIS 4 INDEX: FIB4 SCORE: 1.55

## 2021-05-12 ENCOUNTER — HOME CARE VISIT (OUTPATIENT)
Dept: HOME HEALTH SERVICES | Facility: HOME HEALTHCARE | Age: 86
End: 2021-05-12
Payer: MEDICARE

## 2021-05-12 ASSESSMENT — ENCOUNTER SYMPTOMS
DIFFICULTY THINKING: 1
SEVERE DYSPNEA: 1

## 2021-05-12 NOTE — CASE COMMUNICATION
Quality Review for 5/5/21 Recertification OASIS by DAQUAN Chacon RN on  May 12, 2021      Edits completed by DAQUAN Chacon RN:  1. Completed homebound status section per narrative  2. Activities permitted checked walker  3. Safety measures checked oxygen precautions

## 2021-05-13 ENCOUNTER — HOSPITAL ENCOUNTER (OUTPATIENT)
Dept: LAB | Facility: MEDICAL CENTER | Age: 86
End: 2021-05-13
Attending: INTERNAL MEDICINE
Payer: MEDICARE

## 2021-05-13 ENCOUNTER — OFFICE VISIT (OUTPATIENT)
Dept: CARDIOLOGY | Facility: MEDICAL CENTER | Age: 86
End: 2021-05-13
Payer: MEDICARE

## 2021-05-13 VITALS
DIASTOLIC BLOOD PRESSURE: 56 MMHG | HEIGHT: 63 IN | HEART RATE: 84 BPM | OXYGEN SATURATION: 99 % | RESPIRATION RATE: 14 BRPM | SYSTOLIC BLOOD PRESSURE: 126 MMHG | BODY MASS INDEX: 23.39 KG/M2 | WEIGHT: 132 LBS

## 2021-05-13 DIAGNOSIS — I48.0 PAROXYSMAL A-FIB (HCC): ICD-10-CM

## 2021-05-13 DIAGNOSIS — E78.5 HYPERLIPIDEMIA, UNSPECIFIED HYPERLIPIDEMIA TYPE: ICD-10-CM

## 2021-05-13 DIAGNOSIS — I50.43 ACUTE ON CHRONIC COMBINED SYSTOLIC AND DIASTOLIC CONGESTIVE HEART FAILURE (HCC): ICD-10-CM

## 2021-05-13 DIAGNOSIS — E11.9 TYPE 2 DIABETES MELLITUS WITHOUT COMPLICATION, WITHOUT LONG-TERM CURRENT USE OF INSULIN (HCC): ICD-10-CM

## 2021-05-13 DIAGNOSIS — E87.1 HYPONATREMIA: ICD-10-CM

## 2021-05-13 LAB
ALBUMIN SERPL BCP-MCNC: 3.2 G/DL (ref 3.2–4.9)
ALBUMIN/GLOB SERPL: 0.6 G/DL
ALP SERPL-CCNC: 132 U/L (ref 30–99)
ALT SERPL-CCNC: 14 U/L (ref 2–50)
ANION GAP SERPL CALC-SCNC: 8 MMOL/L (ref 7–16)
AST SERPL-CCNC: 19 U/L (ref 12–45)
BASOPHILS # BLD AUTO: 0.3 % (ref 0–1.8)
BASOPHILS # BLD: 0.02 K/UL (ref 0–0.12)
BILIRUB SERPL-MCNC: 0.3 MG/DL (ref 0.1–1.5)
BUN SERPL-MCNC: 18 MG/DL (ref 8–22)
CALCIUM SERPL-MCNC: 9.3 MG/DL (ref 8.5–10.5)
CHLORIDE SERPL-SCNC: 92 MMOL/L (ref 96–112)
CO2 SERPL-SCNC: 28 MMOL/L (ref 20–33)
CREAT SERPL-MCNC: 0.58 MG/DL (ref 0.5–1.4)
EOSINOPHIL # BLD AUTO: 0.12 K/UL (ref 0–0.51)
EOSINOPHIL NFR BLD: 2 % (ref 0–6.9)
ERYTHROCYTE [DISTWIDTH] IN BLOOD BY AUTOMATED COUNT: 49.2 FL (ref 35.9–50)
GLOBULIN SER CALC-MCNC: 5.2 G/DL (ref 1.9–3.5)
GLUCOSE SERPL-MCNC: 122 MG/DL (ref 65–99)
HCT VFR BLD AUTO: 29.6 % (ref 37–47)
HGB BLD-MCNC: 9.2 G/DL (ref 12–16)
IMM GRANULOCYTES # BLD AUTO: 0.07 K/UL (ref 0–0.11)
IMM GRANULOCYTES NFR BLD AUTO: 1.1 % (ref 0–0.9)
LYMPHOCYTES # BLD AUTO: 0.67 K/UL (ref 1–4.8)
LYMPHOCYTES NFR BLD: 11 % (ref 22–41)
MCH RBC QN AUTO: 25.9 PG (ref 27–33)
MCHC RBC AUTO-ENTMCNC: 31.1 G/DL (ref 33.6–35)
MCV RBC AUTO: 83.4 FL (ref 81.4–97.8)
MONOCYTES # BLD AUTO: 0.64 K/UL (ref 0–0.85)
MONOCYTES NFR BLD AUTO: 10.5 % (ref 0–13.4)
NEUTROPHILS # BLD AUTO: 4.59 K/UL (ref 2–7.15)
NEUTROPHILS NFR BLD: 75.1 % (ref 44–72)
NRBC # BLD AUTO: 0 K/UL
NRBC BLD-RTO: 0 /100 WBC
POTASSIUM SERPL-SCNC: 4.9 MMOL/L (ref 3.6–5.5)
PROT SERPL-MCNC: 8.4 G/DL (ref 6–8.2)
RBC # BLD AUTO: 3.55 M/UL (ref 4.2–5.4)
SODIUM SERPL-SCNC: 128 MMOL/L (ref 135–145)
WBC # BLD AUTO: 6.1 K/UL (ref 4.8–10.8)

## 2021-05-13 PROCEDURE — G0179 MD RECERTIFICATION HHA PT: HCPCS | Performed by: FAMILY MEDICINE

## 2021-05-13 PROCEDURE — 85018 HEMOGLOBIN: CPT

## 2021-05-13 PROCEDURE — 85048 AUTOMATED LEUKOCYTE COUNT: CPT

## 2021-05-13 PROCEDURE — 85041 AUTOMATED RBC COUNT: CPT

## 2021-05-13 PROCEDURE — 80053 COMPREHEN METABOLIC PANEL: CPT

## 2021-05-13 PROCEDURE — 85014 HEMATOCRIT: CPT

## 2021-05-13 PROCEDURE — 36415 COLL VENOUS BLD VENIPUNCTURE: CPT

## 2021-05-13 PROCEDURE — 99214 OFFICE O/P EST MOD 30 MIN: CPT | Performed by: INTERNAL MEDICINE

## 2021-05-13 RX ORDER — VITAMIN B COMPLEX
1000 TABLET ORAL DAILY
COMMUNITY

## 2021-05-13 ASSESSMENT — MINNESOTA LIVING WITH HEART FAILURE QUESTIONNAIRE (MLHF)
DIFFICULTY WITH SEXUAL ACTIVITIES: 0
DIFFICULTY SOCIALIZING WITH FAMILY OR FRIENDS: 5
MAKING YOU FEEL DEPRESSED: 0
MAKING YOU SHORT OF BREATH: 4
DIFFICULTY GOING AWAY FROM HOME: 5
DIFFICULTY WITH RECREATIONAL PASTIMES, SPORTS, HOBBIES: 5
TIRED, FATIGUED OR LOW ON ENERGY: 5
WALKING ABOUT OR CLIMBING STAIRS DIFFICULT: 5
LOSS OF SELF CONTROL IN YOUR LIFE: 0
DIFFICULTY TO CONCENTRATE OR REMEMBERING THINGS: 5
FEELING LIKE A BURDEN TO FAMILY AND FRIENDS: 0
MAKING YOU STAY IN A HOSPITAL: 0
HAVING TO SIT OR LIE DOWN DURING THE DAY: 5
DIFFICULTY WORKING TO EARN A LIVING: 0
GIVING YOU SIDE EFFECTS FROM TREATMENTS: 4
WORKING AROUND THE HOUSE OR YARD DIFFICULT: 5
COSTING YOU MONEY FOR MEDICAL CARE: 5
SWELLING IN ANKLES OR LEGS: 1
DIFFICULTY SLEEPING WELL AT NIGHT: 5
MAKING YOU WORRY: 0
TOTAL_SCORE: 64
EATING LESS FOODS YOU LIKE: 5

## 2021-05-13 ASSESSMENT — FIBROSIS 4 INDEX: FIB4 SCORE: 1.55

## 2021-05-13 ASSESSMENT — ENCOUNTER SYMPTOMS
CLAUDICATION: 0
RESPIRATORY NEGATIVE: 1
WEAKNESS: 0
SPUTUM PRODUCTION: 0
CARDIOVASCULAR NEGATIVE: 1
LOSS OF CONSCIOUSNESS: 0
SORE THROAT: 0
HEMOPTYSIS: 0
MUSCULOSKELETAL NEGATIVE: 1
EYES NEGATIVE: 1
CONSTITUTIONAL NEGATIVE: 1
PND: 0
FEVER: 0
STRIDOR: 0
ORTHOPNEA: 0
BRUISES/BLEEDS EASILY: 0
COUGH: 0
PALPITATIONS: 0
NEUROLOGICAL NEGATIVE: 1
DIZZINESS: 0
GASTROINTESTINAL NEGATIVE: 1
CHILLS: 0
SHORTNESS OF BREATH: 0
WHEEZING: 0

## 2021-05-13 NOTE — CASE COMMUNICATION
Agree with changes.   ----- Message -----  From: Rosalina Chacon R.N.  Sent: 5/12/2021  10:31 AM PDT  To: Rosemary Solo R.N.      Quality Review for 5/5/21 Recertification OASIS by DAQUAN Chacon RN on  May 12, 2021      Edits completed by DAQUAN Chacon RN:  1. Completed homebound status section per narrative  2. Activities permitted checked walker  3. Safety measures checked oxygen precautions

## 2021-05-14 ENCOUNTER — HOME CARE VISIT (OUTPATIENT)
Dept: HOME HEALTH SERVICES | Facility: HOME HEALTHCARE | Age: 86
End: 2021-05-14
Payer: MEDICARE

## 2021-05-14 VITALS
HEART RATE: 78 BPM | SYSTOLIC BLOOD PRESSURE: 129 MMHG | TEMPERATURE: 98.1 F | WEIGHT: 127.2 LBS | BODY MASS INDEX: 22.53 KG/M2 | DIASTOLIC BLOOD PRESSURE: 71 MMHG | RESPIRATION RATE: 16 BRPM | OXYGEN SATURATION: 96 %

## 2021-05-14 PROCEDURE — G0495 RN CARE TRAIN/EDU IN HH: HCPCS

## 2021-05-14 ASSESSMENT — ENCOUNTER SYMPTOMS
VOMITING: DENIES
NAUSEA: DENIES

## 2021-05-14 ASSESSMENT — FIBROSIS 4 INDEX: FIB4 SCORE: 1.69

## 2021-05-14 NOTE — PROGRESS NOTES
Chief Complaint   Patient presents with   • Atrial Fibrillation     F/V Dx: Paroxysmal A-fib (HCC)   • Hypertension   • Hyperlipidemia       Subjective:   Yoon Richards is a 89 y.o. female who presents today as a follow-up for her heart failure preserved ejection fraction.  Since he was last seen she continues to be somewhat short of breath but does appear somewhat better.  She has been taking the furosemide every other day.  Her geriatrician also wants to restart potassium pills.  Her chest x-ray appears better.    Past Medical History:   Diagnosis Date   • CAD (coronary artery disease)    • Chronic systolic heart failure (HCC)    • Dementia (HCC)    • Diabetes (HCC)    • High cholesterol    • Hypertension      Past Surgical History:   Procedure Laterality Date   • CHOLECYSTECTOMY     • CORONARY ARTERY BYPAS, 4       History reviewed. No pertinent family history.  Social History     Socioeconomic History   • Marital status:      Spouse name: Not on file   • Number of children: Not on file   • Years of education: Not on file   • Highest education level: Not on file   Occupational History   • Not on file   Tobacco Use   • Smoking status: Former Smoker   • Smokeless tobacco: Never Used   Vaping Use   • Vaping Use: Never used   Substance and Sexual Activity   • Alcohol use: Never   • Drug use: Never   • Sexual activity: Not Currently   Other Topics Concern   • Not on file   Social History Narrative   • Not on file     Social Determinants of Health     Financial Resource Strain:    • Difficulty of Paying Living Expenses:    Food Insecurity:    • Worried About Running Out of Food in the Last Year:    • Ran Out of Food in the Last Year:    Transportation Needs:    • Lack of Transportation (Medical):    • Lack of Transportation (Non-Medical):    Physical Activity:    • Days of Exercise per Week:    • Minutes of Exercise per Session:    Stress:    • Feeling of Stress :    Social Connections:    • Frequency of  Communication with Friends and Family:    • Frequency of Social Gatherings with Friends and Family:    • Attends [employer] Services:    • Active Member of Clubs or Organizations:    • Attends Club or Organization Meetings:    • Marital Status:    Intimate Partner Violence:    • Fear of Current or Ex-Partner:    • Emotionally Abused:    • Physically Abused:    • Sexually Abused:      Allergies   Allergen Reactions   • Ciprofloxacin    • Ciprofloxacin Hcl    • Donepezil    • Hydrochlorothiazide    • Sulfa Drugs    • Trimethoprim      Outpatient Encounter Medications as of 5/13/2021   Medication Sig Dispense Refill   • vitamin D (CHOLECALCIFEROL) 1000 Unit (25 mcg) Tab Take 1,000 Units by mouth every day.     • Probiotic Product (PROBIOTIC-10 PO) Take 1 tablet by mouth every day.     • linagliptin (TRADJENTA) 5 MG Tab tablet Take 5 mg by mouth every day. Indications: Type 2 Diabetes     • carvedilol (COREG) 3.125 MG Tab Take 1 tablet by mouth 2 times a day with meals. 60 tablet 3   • losartan (COZAAR) 25 MG Tab Take 1 tablet by mouth every day. 30 tablet 11   • furosemide (LASIX) 20 MG Tab Take 1 tablet by mouth every day. (Patient taking differently: Take 20 mg by mouth every Mon, Wed, Fri, Sat, and Sun at 6 PM. Saturday & Sunday as needed) 30 tablet 11   • apixaban (ELIQUIS) 2.5mg Tab Take 1 tablet by mouth 2 times a day. 60 tablet 11   • acetaminophen (TYLENOL) 650 MG CR tablet Take 500 mg by mouth every 6 hours as needed. Indications: Fever, Pain     • Home Care Oxygen Inhale 2 L/min continuous. Oxygen dose range: 1 L/min  Respiratory route via: Nasal Cannula   Oxygen supplier: [employer]         • thiamine (VITAMIN B-1) 100 MG Tab Take 100 mg by mouth every day.     • Iron-Vit C-Vit B12-Folic Acid (IRON 100 PLUS PO) Take  by mouth.       No facility-administered encounter medications on file as of 5/13/2021.     Review of Systems   Constitutional: Negative.  Negative for chills, fever and malaise/fatigue.   HENT:  "Negative.  Negative for sore throat.    Eyes: Negative.    Respiratory: Negative.  Negative for cough, hemoptysis, sputum production, shortness of breath, wheezing and stridor.    Cardiovascular: Negative.  Negative for chest pain, palpitations, orthopnea, claudication, leg swelling and PND.   Gastrointestinal: Negative.    Genitourinary: Negative.    Musculoskeletal: Negative.    Skin: Negative.    Neurological: Negative.  Negative for dizziness, loss of consciousness and weakness.   Endo/Heme/Allergies: Negative.  Does not bruise/bleed easily.   All other systems reviewed and are negative.       Objective:   /56 (BP Location: Left arm, Patient Position: Sitting, BP Cuff Size: Adult)   Pulse 84   Resp 14   Ht 1.6 m (5' 3\")   Wt 59.9 kg (132 lb)   SpO2 99%   BMI 23.38 kg/m²     Physical Exam   Constitutional: She appears well-developed. No distress.   HENT:   Head: Normocephalic and atraumatic.   Right Ear: External ear normal.   Left Ear: External ear normal.   Nose: Nose normal.   Mouth/Throat: No oropharyngeal exudate.   Eyes: Pupils are equal, round, and reactive to light. Conjunctivae are normal. Right eye exhibits no discharge. Left eye exhibits no discharge. No scleral icterus.   Neck: No JVD present.   Cardiovascular: Normal rate and regular rhythm. Exam reveals no gallop and no friction rub.   No murmur heard.  Pulmonary/Chest: Effort normal. No stridor. No respiratory distress. She has no wheezes. She has no rales. She exhibits no tenderness.   Abdominal: Soft. She exhibits no distension. There is no guarding.   Musculoskeletal:         General: No tenderness or deformity. Normal range of motion.      Cervical back: Neck supple.   Neurological: She is alert. She has normal reflexes. She displays normal reflexes. No cranial nerve deficit. She exhibits normal muscle tone. Coordination normal.   Skin: Skin is warm and dry. No rash noted. She is not diaphoretic. No erythema. No pallor. "   Psychiatric: Her behavior is normal. Judgment and thought content normal.   Nursing note and vitals reviewed.      Assessment:     1. Hyponatremia  Basic Metabolic Panel   2. Paroxysmal A-fib ([facility])  Basic Metabolic Panel    EC-ECHOCARDIOGRAM COMPLETE W/O CONT   3. Hyperlipidemia, unspecified hyperlipidemia type  Basic Metabolic Panel    EC-ECHOCARDIOGRAM COMPLETE W/O CONT   4. Type 2 diabetes mellitus without complication, without long-term current use of insulin ([facility])  Basic Metabolic Panel    EC-ECHOCARDIOGRAM COMPLETE W/O CONT   5. Acute on chronic combined systolic and diastolic congestive heart failure (HCC)  EC-ECHOCARDIOGRAM COMPLETE W/O CONT       Medical Decision Making:  Today's Assessment / Status / Plan:     89-year-old female with heart failure with reduced ejection fraction possibly from multivessel coronary disease.  At this point I think we should go ahead and get a formal echocardiogram to reassess the etiology of her heart failure and see if her EF is making any recovery.  At this point we will keep on the same dose of furosemide.  I think she is relatively euvolemic overall.  If her sodium continues to be low which likely resulted elevated BNP which would not be amenable to sodium repletion.  I like to see her back in 4 weeks with labs in between.

## 2021-05-17 ENCOUNTER — PATIENT MESSAGE (OUTPATIENT)
Dept: CARDIOLOGY | Facility: MEDICAL CENTER | Age: 86
End: 2021-05-17

## 2021-05-17 DIAGNOSIS — F03.90 DEMENTIA WITHOUT BEHAVIORAL DISTURBANCE, UNSPECIFIED DEMENTIA TYPE: ICD-10-CM

## 2021-05-17 DIAGNOSIS — E78.5 HYPERLIPIDEMIA, UNSPECIFIED HYPERLIPIDEMIA TYPE: ICD-10-CM

## 2021-05-17 DIAGNOSIS — R77.1 ELEVATED SERUM GLOBULIN LEVEL: ICD-10-CM

## 2021-05-17 DIAGNOSIS — E11.9 TYPE 2 DIABETES MELLITUS WITHOUT COMPLICATION, WITHOUT LONG-TERM CURRENT USE OF INSULIN (HCC): ICD-10-CM

## 2021-05-18 ENCOUNTER — HOME CARE VISIT (OUTPATIENT)
Dept: HOME HEALTH SERVICES | Facility: HOME HEALTHCARE | Age: 86
End: 2021-05-18
Payer: MEDICARE

## 2021-05-18 VITALS
SYSTOLIC BLOOD PRESSURE: 121 MMHG | RESPIRATION RATE: 16 BRPM | DIASTOLIC BLOOD PRESSURE: 69 MMHG | HEART RATE: 79 BPM | OXYGEN SATURATION: 96 % | WEIGHT: 128 LBS | BODY MASS INDEX: 22.67 KG/M2 | TEMPERATURE: 97.6 F

## 2021-05-18 PROCEDURE — G0493 RN CARE EA 15 MIN HH/HOSPICE: HCPCS

## 2021-05-18 ASSESSMENT — FIBROSIS 4 INDEX: FIB4 SCORE: 1.69

## 2021-05-18 ASSESSMENT — ACTIVITIES OF DAILY LIVING (ADL)
CURRENT_FUNCTION: STAND BY ASSIST
AMBULATION ASSISTANCE: STAND BY ASSIST

## 2021-05-18 ASSESSMENT — ENCOUNTER SYMPTOMS
SHORTNESS OF BREATH: T
NAUSEA: DENIES
MUSCLE WEAKNESS: 1
VOMITING: DENIES
DIFFICULTY THINKING: 1

## 2021-05-19 RX ORDER — FUROSEMIDE 20 MG/1
20 TABLET ORAL
Qty: 90 TABLET | Refills: 3 | Status: ON HOLD | OUTPATIENT
Start: 2021-05-19 | End: 2021-06-29 | Stop reason: SDUPTHER

## 2021-05-21 ENCOUNTER — HOME CARE VISIT (OUTPATIENT)
Dept: HOME HEALTH SERVICES | Facility: HOME HEALTHCARE | Age: 86
End: 2021-05-21
Payer: MEDICARE

## 2021-05-25 ENCOUNTER — HOME CARE VISIT (OUTPATIENT)
Dept: HOME HEALTH SERVICES | Facility: HOME HEALTHCARE | Age: 86
End: 2021-05-25
Payer: MEDICARE

## 2021-05-25 PROCEDURE — G0493 RN CARE EA 15 MIN HH/HOSPICE: HCPCS

## 2021-05-25 ASSESSMENT — FIBROSIS 4 INDEX: FIB4 SCORE: 1.69

## 2021-05-26 ENCOUNTER — HOME CARE VISIT (OUTPATIENT)
Dept: HOME HEALTH SERVICES | Facility: HOME HEALTHCARE | Age: 86
End: 2021-05-26
Payer: MEDICARE

## 2021-05-26 VITALS
RESPIRATION RATE: 17 BRPM | HEART RATE: 82 BPM | DIASTOLIC BLOOD PRESSURE: 66 MMHG | WEIGHT: 129.6 LBS | TEMPERATURE: 97.6 F | SYSTOLIC BLOOD PRESSURE: 128 MMHG | OXYGEN SATURATION: 98 % | BODY MASS INDEX: 22.96 KG/M2

## 2021-05-26 ASSESSMENT — ENCOUNTER SYMPTOMS
VOMITING: DENIES
MUSCLE WEAKNESS: 1
DIFFICULTY THINKING: 1
SHORTNESS OF BREATH: T
NAUSEA: DENIES

## 2021-05-26 ASSESSMENT — ACTIVITIES OF DAILY LIVING (ADL)
AMBULATION ASSISTANCE: STAND BY ASSIST
CURRENT_FUNCTION: STAND BY ASSIST

## 2021-05-27 ENCOUNTER — HOME CARE VISIT (OUTPATIENT)
Dept: HOME HEALTH SERVICES | Facility: HOME HEALTHCARE | Age: 86
End: 2021-05-27
Payer: MEDICARE

## 2021-05-27 ENCOUNTER — HOSPITAL ENCOUNTER (EMERGENCY)
Facility: MEDICAL CENTER | Age: 86
End: 2021-05-27
Attending: EMERGENCY MEDICINE
Payer: MEDICARE

## 2021-05-27 ENCOUNTER — PATIENT MESSAGE (OUTPATIENT)
Dept: CARDIOLOGY | Facility: MEDICAL CENTER | Age: 86
End: 2021-05-27

## 2021-05-27 ENCOUNTER — APPOINTMENT (OUTPATIENT)
Dept: RADIOLOGY | Facility: MEDICAL CENTER | Age: 86
End: 2021-05-27
Attending: EMERGENCY MEDICINE
Payer: MEDICARE

## 2021-05-27 VITALS
HEIGHT: 63 IN | OXYGEN SATURATION: 98 % | WEIGHT: 132.94 LBS | BODY MASS INDEX: 23.55 KG/M2 | HEART RATE: 77 BPM | DIASTOLIC BLOOD PRESSURE: 79 MMHG | SYSTOLIC BLOOD PRESSURE: 154 MMHG | RESPIRATION RATE: 18 BRPM | TEMPERATURE: 97.6 F

## 2021-05-27 DIAGNOSIS — S93.402A SPRAIN OF LEFT ANKLE, UNSPECIFIED LIGAMENT, INITIAL ENCOUNTER: ICD-10-CM

## 2021-05-27 DIAGNOSIS — S09.90XA CLOSED HEAD INJURY, INITIAL ENCOUNTER: ICD-10-CM

## 2021-05-27 LAB
APPEARANCE UR: CLEAR
BILIRUB UR QL STRIP.AUTO: NEGATIVE
COLOR UR: YELLOW
GLUCOSE UR STRIP.AUTO-MCNC: NEGATIVE MG/DL
KETONES UR STRIP.AUTO-MCNC: NEGATIVE MG/DL
LEUKOCYTE ESTERASE UR QL STRIP.AUTO: NEGATIVE
MICRO URNS: NORMAL
NITRITE UR QL STRIP.AUTO: NEGATIVE
PH UR STRIP.AUTO: 7 [PH] (ref 5–8)
PROT UR QL STRIP: NEGATIVE MG/DL
RBC UR QL AUTO: NEGATIVE
SP GR UR STRIP.AUTO: 1.01

## 2021-05-27 PROCEDURE — 99283 EMERGENCY DEPT VISIT LOW MDM: CPT

## 2021-05-27 PROCEDURE — 73610 X-RAY EXAM OF ANKLE: CPT | Mod: LT

## 2021-05-27 PROCEDURE — G0493 RN CARE EA 15 MIN HH/HOSPICE: HCPCS

## 2021-05-27 PROCEDURE — 70450 CT HEAD/BRAIN W/O DYE: CPT | Mod: ME

## 2021-05-27 PROCEDURE — 81003 URINALYSIS AUTO W/O SCOPE: CPT

## 2021-05-27 RX ORDER — FLUCONAZOLE 150 MG/1
150 TABLET ORAL DAILY
Qty: 1 TABLET | Refills: 0 | Status: SHIPPED | OUTPATIENT
Start: 2021-05-27 | End: 2021-05-31

## 2021-05-27 RX ORDER — ATORVASTATIN CALCIUM 20 MG/1
20 TABLET, FILM COATED ORAL NIGHTLY
COMMUNITY

## 2021-05-27 ASSESSMENT — ENCOUNTER SYMPTOMS
VOMITING: DENIES
SHORTNESS OF BREATH: T
MUSCLE WEAKNESS: 1
DIFFICULTY THINKING: 1
NAUSEA: DENIES

## 2021-05-27 ASSESSMENT — FIBROSIS 4 INDEX
FIB4 SCORE: 1.69
FIB4 SCORE: 1.69

## 2021-05-27 ASSESSMENT — ACTIVITIES OF DAILY LIVING (ADL)
CURRENT_FUNCTION: STAND BY ASSIST
AMBULATION ASSISTANCE: STAND BY ASSIST

## 2021-05-27 NOTE — ED TRIAGE NOTES
Pt bib w/c w family (pt lives with family); c/o t5000 GLF this am. Pt fell onto L side of body, hitting her head. Pt/family denies LOC, pt has hx dementia. Pt taking blood thinners

## 2021-05-27 NOTE — ED NOTES
Seen by ERP at the BS to discuss results and d/c paln     Pt and family given d/c paperwork and Rx p/u information, pt and family verbalized understanding all information given. Pt assisted out of the ER in w/c w/ family

## 2021-05-27 NOTE — ED NOTES
Pt discharged to family, reviewed all discharge instructions including follow up, pt verbalizes understanding, and denies questions.   Escorted to lobby. No belongings left in room.

## 2021-05-27 NOTE — CASE COMMUNICATION
PATIENT FALL OVERNIGHT:  Daughter called to report fall while coming to standing position at side of bed, fell forward striking side of head against night stand. No LOC and 'looks good' however she felt she needed to report this fall. Patient AAOx4. No bleeding/head wound or other c/o injury. Review of medical history and medications reveals current use of eliquis. Daughter informed by this RN that she is to have patient evaluated by EMS or taken to the hospital due to risk of bleeding, possible SDH or other hemmorhagic trauma may not be evident to the naked eye or obvious and needs to be evaluated. Daughter understands. She states that instead she will 'keep an eye' on her mother tonight and maybe take her to the hospital in AM. Informed daughter that this RN would pass message/information to CM and Nursing Sup.

## 2021-05-27 NOTE — ED NOTES
Pt back to room in wheelchair with daughter and son  Pt A & 0 x 1 which they report is pt baseline, speech clear, pupils equal and reactive    Pt assisted onto gurney, pt weak which family reports is normal for patient    Pt resting on gurney, pt in no acute distress, pt provided call light, family oriented to call light, instructed to call if needing any assistance, instructed not to get up by self, gurney in lowest position.

## 2021-05-27 NOTE — PATIENT COMMUNICATION
Yoon Richards Christopher J, M.D. 17 minutes ago (10:08 AM)   TW  continuation of last message. She has itchy vagina. Urinating or feeling of urinating all night up until 4am. We were up every 10 to 25 minutes from evening to 4am. She feels pressure to go, sometimes she would void and other times not.  Thanks, Joy      S/W Joy, aware to take pt to ER for complete workup. Wants to wait until this afternoon to take her. Advised to monitor for change if choosing to wait but really needs to go today, Joy agreeable to POC

## 2021-05-27 NOTE — ED PROVIDER NOTES
ED Provider Note    CHIEF COMPLAINT  Chief Complaint   Patient presents with   • T-5000 GLF       HPI  Yoon Richards is a 89 y.o. female here for evaluation after a fall.  Patient was brought in by caregiver and family after is noted that last evening she went to get up out of bed, and slipped on the floor.  She was attempting to use her walker, but was unable to do so.  Patient fell to the ground, did not lose consciousness, but on the way down did hit the nightstand.  She is on Eliquis.  Patient has no neck pain, back pain, abdominal pain, or arm pain.  Patient does have some mild left ankle tenderness.  She denies any chest pain or shortness of breath.  She has no headache.  Pt care giver state she may have a 'yeast infection' as well.    ROS;  Please see HPI  O/W negative     PAST MEDICAL HISTORY   has a past medical history of CAD (coronary artery disease), Chronic systolic heart failure (HCC), Dementia (HCC), Diabetes (HCC), High cholesterol, and Hypertension.    SOCIAL HISTORY  Social History     Tobacco Use   • Smoking status: Former Smoker   • Smokeless tobacco: Never Used   Vaping Use   • Vaping Use: Never used   Substance and Sexual Activity   • Alcohol use: Never   • Drug use: Never   • Sexual activity: Not Currently       SURGICAL HISTORY   has a past surgical history that includes coronary artery bypas, 4 and cholecystectomy.    CURRENT MEDICATIONS  Home Medications     Reviewed by Christine Eugene (Pharmacy Tech) on 05/27/21 at 1556  Med List Status: Complete   Medication Last Dose Status   acetaminophen (TYLENOL) 650 MG CR tablet 5/26/2021 Active   apixaban (ELIQUIS) 2.5mg Tab 5/26/2021 Active   atorvastatin (LIPITOR) 20 MG Tab 5/26/2021 Active   Calcium Carb-Cholecalciferol (OYSTER SHELL CALCIUM/VITAMIN D) 250-125 MG-UNIT Tab tablet 5/26/2021 Active   carvedilol (COREG) 3.125 MG Tab 5/27/2021 Active   furosemide (LASIX) 20 MG Tab 5/27/2021 Active   Home Care Oxygen CONTINUOUS Active   Iron-Vit  "C-Vit B12-Folic Acid (IRON 100 PLUS PO) 5/26/2021 Active   linagliptin (TRADJENTA) 5 MG Tab tablet 5/27/2021 Active   losartan (COZAAR) 25 MG Tab 5/27/2021 Active   polyethylene glycol/lytes (MIRALAX) 17 g Pack 5/25/2021 Active   Probiotic Product (PROBIOTIC-10 PO) 5/27/2021 Active   thiamine (VITAMIN B-1) 100 MG Tab 5/26/2021 Active   vitamin D (CHOLECALCIFEROL) 1000 Unit (25 mcg) Tab 5/26/2021 Active                ALLERGIES  Allergies   Allergen Reactions   • Ciprofloxacin Itching   • Donepezil Vomiting   • Hydrochlorothiazide Itching   • Sulfa Drugs Itching   • Trimethoprim Itching       REVIEW OF SYSTEMS  See HPI for further details. Review of systems as above, otherwise all other systems are negative.     PHYSICAL EXAM  VITAL SIGNS: /76   Pulse 82   Temp 36.4 °C (97.6 °F) (Temporal)   Resp 18   Ht 1.6 m (5' 3\")   Wt 60.3 kg (132 lb 15 oz)   SpO2 100%   BMI 23.55 kg/m²     Constitutional: Well developed, well nourished. No acute distress.  HEENT: Normocephalic, atraumatic. MMM  Neck: Supple, Full range of motion, nontender midline  Chest/Pulmonary:  No respiratory distress.  Equal expansion   Musculoskeletal: No deformity, no edema, neurovascular intact.  Tenderness to the left lateral malleolus, nontender left knee.  Good range of motion.  Nontender foot.  Neuro: Clear speech, appropriate, cooperative, cranial nerves II-XII grossly intact.  Psych: Normal mood and affect      PROCEDURES     MEDICAL RECORD  I have reviewed patient's medical record and pertinent results are listed.    COURSE & MEDICAL DECISION MAKING  I have reviewed any medical record information, laboratory studies and radiographic results as noted above.    CT-HEAD W/O   Final Result      1.  No evidence of acute intracranial process.      2.  Cerebral atrophy as well as periventricular chronic small vessel ischemic change.      DX-ANKLE 3+ VIEWS LEFT   Final Result      Mild bony spurring with no acute displaced fracture " "identified        Results for orders placed or performed during the hospital encounter of 05/27/21   URINALYSIS,CULTURE IF INDICATED    Specimen: Urine   Result Value Ref Range    Color Yellow     Character Clear     Specific Gravity 1.010 <1.035    Ph 7.0 5.0 - 8.0    Glucose Negative Negative mg/dL    Ketones Negative Negative mg/dL    Protein Negative Negative mg/dL    Bilirubin Negative Negative    Nitrite Negative Negative    Leukocyte Esterase Negative Negative    Occult Blood Negative Negative    Micro Urine Req see below          I you have had any blood pressure issues while here in the emergency department, please see your doctor for a further evaluation or work up.    4:01 PM  Patient has no acute finding on CT or left ankle x-ray series.  She has no other medical complaints, the caregiver however states that she may have a \"yeast infection.\"  I will treat her with Diflucan one-time dose as well.    Differential diagnoses include but not limited to: closed head, subdural, epidural, sah, fracture vs strain.     This patient presents with closed head injury.  At this time, I have counseled the patient/family regarding their medications, pain control, and follow up.  They will continue their medications, if any, as prescribed.  They will return immediately for any worsening symptoms and/or any other medical concerns.  They will see their doctor, or contact the doctor provided, in 1-2 days for follow up.       FINAL IMPRESSION  Closed head injury  Left ankle strain         Electronically signed by: Ramirez Becker D.O., 5/27/2021 3:59 PM      "

## 2021-05-27 NOTE — ED NOTES
Pharmacy Medication Reconciliation      Medication reconciliation updated and complete per pt family at bedside with Rx bottles. Reviewed Rx bottles and returned at bedside  Allergies have been verified and updated   No oral ABX within the last 14 days  Patient home pharmacy:[facility]    Pt family reports pt received 10mg (1/2 tablet) Lasix at 0800 today 05/27/2021

## 2021-05-28 VITALS
HEART RATE: 79 BPM | WEIGHT: 128.8 LBS | DIASTOLIC BLOOD PRESSURE: 60 MMHG | BODY MASS INDEX: 22.82 KG/M2 | OXYGEN SATURATION: 98 % | RESPIRATION RATE: 16 BRPM | TEMPERATURE: 97.8 F | SYSTOLIC BLOOD PRESSURE: 120 MMHG

## 2021-05-31 ENCOUNTER — HOME CARE VISIT (OUTPATIENT)
Dept: HOME HEALTH SERVICES | Facility: HOME HEALTHCARE | Age: 86
End: 2021-05-31
Payer: MEDICARE

## 2021-05-31 VITALS
BODY MASS INDEX: 23.91 KG/M2 | OXYGEN SATURATION: 98 % | RESPIRATION RATE: 16 BRPM | SYSTOLIC BLOOD PRESSURE: 119 MMHG | TEMPERATURE: 97.8 F | HEART RATE: 77 BPM | DIASTOLIC BLOOD PRESSURE: 60 MMHG | WEIGHT: 135 LBS

## 2021-05-31 PROCEDURE — G0493 RN CARE EA 15 MIN HH/HOSPICE: HCPCS

## 2021-05-31 ASSESSMENT — ENCOUNTER SYMPTOMS
VOMITING: DENIES
SHORTNESS OF BREATH: T
MUSCLE WEAKNESS: 1
NAUSEA: DENIES

## 2021-05-31 ASSESSMENT — FIBROSIS 4 INDEX: FIB4 SCORE: 1.69

## 2021-05-31 ASSESSMENT — ACTIVITIES OF DAILY LIVING (ADL)
CURRENT_FUNCTION: STAND BY ASSIST
AMBULATION ASSISTANCE: STAND BY ASSIST

## 2021-06-03 PROBLEM — R26.81 GAIT INSTABILITY: Status: ACTIVE | Noted: 2021-06-03

## 2021-06-03 PROBLEM — Z91.81 AT HIGH RISK FOR FALLS: Status: ACTIVE | Noted: 2021-06-03

## 2021-06-03 PROBLEM — L60.2 ONYCHOGRYPHOSIS: Status: ACTIVE | Noted: 2021-06-03

## 2021-06-03 PROBLEM — R33.9 URINARY RETENTION: Status: ACTIVE | Noted: 2021-06-03

## 2021-06-03 PROBLEM — R09.02 HYPOXEMIA: Status: ACTIVE | Noted: 2021-06-03

## 2021-06-10 ENCOUNTER — HOME CARE VISIT (OUTPATIENT)
Dept: HOME HEALTH SERVICES | Facility: HOME HEALTHCARE | Age: 86
End: 2021-06-10
Payer: MEDICARE

## 2021-06-10 VITALS
BODY MASS INDEX: 23.88 KG/M2 | RESPIRATION RATE: 17 BRPM | TEMPERATURE: 98.3 F | HEART RATE: 73 BPM | OXYGEN SATURATION: 98 % | SYSTOLIC BLOOD PRESSURE: 116 MMHG | WEIGHT: 134.8 LBS | DIASTOLIC BLOOD PRESSURE: 60 MMHG

## 2021-06-10 PROCEDURE — G0493 RN CARE EA 15 MIN HH/HOSPICE: HCPCS

## 2021-06-10 PROCEDURE — 665003 FOLLOW UP-HOME HEALTH

## 2021-06-10 ASSESSMENT — ACTIVITIES OF DAILY LIVING (ADL)
AMBULATION ASSISTANCE: STAND BY ASSIST
CURRENT_FUNCTION: STAND BY ASSIST

## 2021-06-10 ASSESSMENT — ENCOUNTER SYMPTOMS
MUSCLE WEAKNESS: 1
VOMITING: DENIES
LIMITED RANGE OF MOTION: 1
NAUSEA: DENIES

## 2021-06-10 ASSESSMENT — FIBROSIS 4 INDEX: FIB4 SCORE: 1.69

## 2021-06-15 ENCOUNTER — HOME CARE VISIT (OUTPATIENT)
Dept: HOME HEALTH SERVICES | Facility: HOME HEALTHCARE | Age: 86
End: 2021-06-15
Payer: MEDICARE

## 2021-06-15 ENCOUNTER — HOSPITAL ENCOUNTER (EMERGENCY)
Facility: MEDICAL CENTER | Age: 86
End: 2021-06-16
Attending: EMERGENCY MEDICINE
Payer: MEDICARE

## 2021-06-15 ENCOUNTER — APPOINTMENT (OUTPATIENT)
Dept: RADIOLOGY | Facility: MEDICAL CENTER | Age: 86
End: 2021-06-15
Attending: EMERGENCY MEDICINE
Payer: MEDICARE

## 2021-06-15 ENCOUNTER — HOSPITAL ENCOUNTER (OUTPATIENT)
Facility: MEDICAL CENTER | Age: 86
End: 2021-06-15
Attending: NURSE PRACTITIONER
Payer: MEDICARE

## 2021-06-15 DIAGNOSIS — N39.0 ACUTE UTI: ICD-10-CM

## 2021-06-15 DIAGNOSIS — R33.9 URINARY RETENTION: ICD-10-CM

## 2021-06-15 DIAGNOSIS — R31.9 HEMATURIA, UNSPECIFIED TYPE: ICD-10-CM

## 2021-06-15 DIAGNOSIS — R10.84 GENERALIZED ABDOMINAL PAIN: ICD-10-CM

## 2021-06-15 LAB
ALBUMIN SERPL BCP-MCNC: 3 G/DL (ref 3.2–4.9)
ALBUMIN/GLOB SERPL: 0.7 G/DL
ALP SERPL-CCNC: 159 U/L (ref 30–99)
ALT SERPL-CCNC: 24 U/L (ref 2–50)
ANION GAP SERPL CALC-SCNC: 9 MMOL/L (ref 7–16)
AST SERPL-CCNC: 37 U/L (ref 12–45)
BILIRUB SERPL-MCNC: 0.4 MG/DL (ref 0.1–1.5)
BUN SERPL-MCNC: 15 MG/DL (ref 8–22)
CALCIUM SERPL-MCNC: 8.7 MG/DL (ref 8.5–10.5)
CHLORIDE SERPL-SCNC: 96 MMOL/L (ref 96–112)
CO2 SERPL-SCNC: 22 MMOL/L (ref 20–33)
CREAT SERPL-MCNC: 0.57 MG/DL (ref 0.5–1.4)
GLOBULIN SER CALC-MCNC: 4.4 G/DL (ref 1.9–3.5)
GLUCOSE SERPL-MCNC: 172 MG/DL (ref 65–99)
LACTATE BLD-SCNC: 1.1 MMOL/L (ref 0.5–2)
LIPASE SERPL-CCNC: 24 U/L (ref 11–82)
POTASSIUM SERPL-SCNC: 4.1 MMOL/L (ref 3.6–5.5)
PROT SERPL-MCNC: 7.4 G/DL (ref 6–8.2)
SODIUM SERPL-SCNC: 127 MMOL/L (ref 135–145)

## 2021-06-15 PROCEDURE — 81001 URINALYSIS AUTO W/SCOPE: CPT

## 2021-06-15 PROCEDURE — 99284 EMERGENCY DEPT VISIT MOD MDM: CPT

## 2021-06-15 PROCEDURE — 83690 ASSAY OF LIPASE: CPT

## 2021-06-15 PROCEDURE — 87077 CULTURE AEROBIC IDENTIFY: CPT

## 2021-06-15 PROCEDURE — 87086 URINE CULTURE/COLONY COUNT: CPT

## 2021-06-15 PROCEDURE — 83605 ASSAY OF LACTIC ACID: CPT

## 2021-06-15 PROCEDURE — 80053 COMPREHEN METABOLIC PANEL: CPT

## 2021-06-15 ASSESSMENT — FIBROSIS 4 INDEX: FIB4 SCORE: 1.69

## 2021-06-16 ENCOUNTER — APPOINTMENT (OUTPATIENT)
Dept: RADIOLOGY | Facility: MEDICAL CENTER | Age: 86
End: 2021-06-16
Attending: EMERGENCY MEDICINE
Payer: MEDICARE

## 2021-06-16 ENCOUNTER — HOME CARE VISIT (OUTPATIENT)
Dept: HOME HEALTH SERVICES | Facility: HOME HEALTHCARE | Age: 86
End: 2021-06-16
Payer: MEDICARE

## 2021-06-16 VITALS
TEMPERATURE: 96.8 F | SYSTOLIC BLOOD PRESSURE: 166 MMHG | HEIGHT: 63 IN | DIASTOLIC BLOOD PRESSURE: 85 MMHG | OXYGEN SATURATION: 99 % | HEART RATE: 78 BPM | RESPIRATION RATE: 20 BRPM | BODY MASS INDEX: 24.45 KG/M2 | WEIGHT: 138 LBS

## 2021-06-16 LAB
APPEARANCE UR: CLEAR
BACTERIA #/AREA URNS HPF: NEGATIVE /HPF
BASOPHILS # BLD AUTO: 0.4 % (ref 0–1.8)
BASOPHILS # BLD: 0.02 K/UL (ref 0–0.12)
BILIRUB UR QL STRIP.AUTO: NEGATIVE
COLOR UR: YELLOW
COMMENT 1642: NORMAL
EOSINOPHIL # BLD AUTO: 0.08 K/UL (ref 0–0.51)
EOSINOPHIL NFR BLD: 1.4 % (ref 0–6.9)
EPI CELLS #/AREA URNS HPF: ABNORMAL /HPF
ERYTHROCYTE [DISTWIDTH] IN BLOOD BY AUTOMATED COUNT: 64.2 FL (ref 35.9–50)
GLUCOSE UR STRIP.AUTO-MCNC: NEGATIVE MG/DL
HCT VFR BLD AUTO: 27.3 % (ref 37–47)
HGB BLD-MCNC: 8.7 G/DL (ref 12–16)
HYALINE CASTS #/AREA URNS LPF: ABNORMAL /LPF
IMM GRANULOCYTES # BLD AUTO: 0.02 K/UL (ref 0–0.11)
IMM GRANULOCYTES NFR BLD AUTO: 0.4 % (ref 0–0.9)
KETONES UR STRIP.AUTO-MCNC: NEGATIVE MG/DL
LEUKOCYTE ESTERASE UR QL STRIP.AUTO: ABNORMAL
LYMPHOCYTES # BLD AUTO: 0.44 K/UL (ref 1–4.8)
LYMPHOCYTES NFR BLD: 8 % (ref 22–41)
MCH RBC QN AUTO: 27.7 PG (ref 27–33)
MCHC RBC AUTO-ENTMCNC: 31.9 G/DL (ref 33.6–35)
MCV RBC AUTO: 86.9 FL (ref 81.4–97.8)
MICRO URNS: ABNORMAL
MONOCYTES # BLD AUTO: 0.58 K/UL (ref 0–0.85)
MONOCYTES NFR BLD AUTO: 10.5 % (ref 0–13.4)
MORPHOLOGY BLD-IMP: NORMAL
NEUTROPHILS # BLD AUTO: 4.38 K/UL (ref 2–7.15)
NEUTROPHILS NFR BLD: 79.3 % (ref 44–72)
NITRITE UR QL STRIP.AUTO: NEGATIVE
NRBC # BLD AUTO: 0 K/UL
NRBC BLD-RTO: 0 /100 WBC
PH UR STRIP.AUTO: 6.5 [PH] (ref 5–8)
PMV BLD AUTO: 10.6 FL (ref 9–12.9)
PROT UR QL STRIP: 100 MG/DL
RBC # BLD AUTO: 3.14 M/UL (ref 4.2–5.4)
RBC # URNS HPF: >150 /HPF
RBC UR QL AUTO: ABNORMAL
RENAL EPI CELLS #/AREA URNS HPF: ABNORMAL /HPF
SP GR UR STRIP.AUTO: 1.01
UROBILINOGEN UR STRIP.AUTO-MCNC: 0.2 MG/DL
WBC # BLD AUTO: 5.5 K/UL (ref 4.8–10.8)
WBC #/AREA URNS HPF: ABNORMAL /HPF

## 2021-06-16 PROCEDURE — 700111 HCHG RX REV CODE 636 W/ 250 OVERRIDE (IP): Performed by: EMERGENCY MEDICINE

## 2021-06-16 PROCEDURE — 700102 HCHG RX REV CODE 250 W/ 637 OVERRIDE(OP): Performed by: EMERGENCY MEDICINE

## 2021-06-16 PROCEDURE — 74177 CT ABD & PELVIS W/CONTRAST: CPT | Mod: ME

## 2021-06-16 PROCEDURE — A9270 NON-COVERED ITEM OR SERVICE: HCPCS | Performed by: EMERGENCY MEDICINE

## 2021-06-16 PROCEDURE — 700117 HCHG RX CONTRAST REV CODE 255: Performed by: EMERGENCY MEDICINE

## 2021-06-16 PROCEDURE — 85025 COMPLETE CBC W/AUTO DIFF WBC: CPT

## 2021-06-16 PROCEDURE — 96374 THER/PROPH/DIAG INJ IV PUSH: CPT

## 2021-06-16 RX ORDER — HYDROCODONE BITARTRATE AND ACETAMINOPHEN 5; 325 MG/1; MG/1
.5-1 TABLET ORAL EVERY 6 HOURS PRN
Qty: 12 TABLET | Refills: 0 | Status: ON HOLD | OUTPATIENT
Start: 2021-06-16 | End: 2021-06-29

## 2021-06-16 RX ORDER — HYDROCODONE BITARTRATE AND ACETAMINOPHEN 5; 325 MG/1; MG/1
1 TABLET ORAL ONCE
Status: COMPLETED | OUTPATIENT
Start: 2021-06-16 | End: 2021-06-16

## 2021-06-16 RX ORDER — HYDROXYZINE HYDROCHLORIDE 25 MG/1
25 TABLET, FILM COATED ORAL ONCE
Status: COMPLETED | OUTPATIENT
Start: 2021-06-16 | End: 2021-06-16

## 2021-06-16 RX ADMIN — CEFTRIAXONE SODIUM 1 G: 10 INJECTION, POWDER, FOR SOLUTION INTRAVENOUS at 02:57

## 2021-06-16 RX ADMIN — IOHEXOL 100 ML: 350 INJECTION, SOLUTION INTRAVENOUS at 00:58

## 2021-06-16 RX ADMIN — HYDROXYZINE HYDROCHLORIDE 25 MG: 25 TABLET, FILM COATED ORAL at 02:10

## 2021-06-16 RX ADMIN — HYDROCODONE BITARTRATE AND ACETAMINOPHEN 1 TABLET: 5; 325 TABLET ORAL at 03:21

## 2021-06-16 NOTE — DISCHARGE INSTRUCTIONS
No drinking or drive on Norco. Keep using her antibiotics until gone. Return if she has increasing pain, fever, severe vomiting or no urine in the bag for 6 hours.  Make sure she uses MiraLAX with the narcotics.

## 2021-06-16 NOTE — ED PROVIDER NOTES
"ED Provider Note    Scribed for Brendan Desir M.D. by Chuck Arellano. 6/15/2021, 10:32 PM.    Primary care provider: Radha Brady P.A.-C.  Means of arrival: EMS  History obtained from: patient, daughter  History limited by: none    CHIEF COMPLAINT  Chief Complaint   Patient presents with    Urinary Catheter Problem     Pt bib EMS from home where she lives with her daughter. Pt complaining of pain at site of new catheter that was placed today. Pt's daughter concerned with pink/bloody output and decreased urinary output. Pt's daughter also states that the patient is weak. Pt is currently on abx for a UTI. Pt has dementia at baseline A&Ox1, speaks Mongolian and english, pt was also given fentanyl and zofran by EMS. Pt wears 2L NC at home due to CHF. 83% on RA.        CARI Richards is a 89 y.o. female with a history of dementia who presents to the Emergency Department for a urinary catheter problem occurring earlier today. The patient is A&O x1 at baseline. Per the daughter, the patient's abdomen had been distended for months prior to today. The patient was visited by home health earlier today for this issue and had a echeverria catheter placed, which removed 400 mL of urine right away. She was diagnosed with a UTI and started on Keflex. She has tugging at the catheter and appears irritated by it. The daughter also expresses concerns that the patient has had decreased urinary output and that the urine appears \"blood-tinged.\" The daughter also states the patient seems to have been having abdominal pain prior to the catheter placement. No vomiting, diarrhea, or fevers. Per the daughter, the patient's arms and legs have been itchy today.    REVIEW OF SYSTEMS  Pertinent positives include urinary catheter problem, decreased urinary output, pinkish urine, abdominal pain, itchy skin. Pertinent negatives include no vomiting, diarrhea, fevers. All other systems negative.    PAST MEDICAL HISTORY   has a past medical " "history of CAD (coronary artery disease), Chronic systolic heart failure (HCC), Dementia (HCC), Diabetes (HCC), High cholesterol, and Hypertension.    SURGICAL HISTORY   has a past surgical history that includes coronary artery bypas, 4 and cholecystectomy.    SOCIAL HISTORY  Social History     Tobacco Use    Smoking status: Former Smoker    Smokeless tobacco: Never Used   Vaping Use    Vaping Use: Never used   Substance Use Topics    Alcohol use: Never    Drug use: Never      Social History     Substance and Sexual Activity   Drug Use Never       FAMILY HISTORY  None noted.    CURRENT MEDICATIONS  Home Medications       Reviewed by Amber Dee R.N. (Registered Nurse) on 06/15/21 at 2201  Med List Status: Not Addressed     Medication Last Dose Status   acetaminophen (TYLENOL) 650 MG CR tablet  Active   apixaban (ELIQUIS) 2.5mg Tab  Active   atorvastatin (LIPITOR) 20 MG Tab  Active   Calcium Carb-Cholecalciferol (OYSTER SHELL CALCIUM/VITAMIN D) 250-125 MG-UNIT Tab tablet  Active   carvedilol (COREG) 3.125 MG Tab  Active   furosemide (LASIX) 20 MG Tab  Active   Home Care Oxygen  Active   Iron-Vit C-Vit B12-Folic Acid (IRON 100 PLUS PO)  Active   linagliptin (TRADJENTA) 5 MG Tab tablet  Active   Loratadine 5 MG TABLET DISPERSIBLE  Active   losartan (COZAAR) 25 MG Tab  Active   oxybutynin (DITROPAN) 5 MG Tab  Active   polyethylene glycol/lytes (MIRALAX) 17 g Pack  Active   Probiotic Product (PROBIOTIC-10 PO)  Active   thiamine (VITAMIN B-1) 100 MG Tab  Active   vitamin D (CHOLECALCIFEROL) 1000 Unit (25 mcg) Tab  Active                    ALLERGIES  Allergies   Allergen Reactions    Ciprofloxacin Itching    Donepezil Vomiting    Hydrochlorothiazide Itching    Sulfa Drugs Itching    Trimethoprim Itching       PHYSICAL EXAM  VITAL SIGNS: BP (!) 187/83   Pulse 74   Temp 36 °C (96.8 °F) (Oral)   Resp 16   Ht 1.6 m (5' 3\")   Wt 62.6 kg (138 lb)   SpO2 99%   BMI 24.45 kg/m²     Constitutional: Well developed, " Well nourished, mild distress.   HENT: Normocephalic, Atraumatic, mask in place.  Eyes: Conjunctiva normal, No discharge.   Cardiovascular: Normal heart rate, Normal rhythm, No murmurs, equal pulses.   Pulmonary: Normal breath sounds, No respiratory distress, No wheezing, No rales, No rhonchi.  Abdomen: Soft, Mild suprapubic tenderness, Bladder US shows bladder is decompressed and echeverria catheter is placed correctly. No masses, no rebound, no guarding.   Back: Right CVA tenderness.   Musculoskeletal: No major deformities noted  Skin: Warm, Dry, No erythema, No rash.   Neurologic: Alert & oriented x 3, Normal motor function,  No focal deficits noted.   Psychiatric: Affect normal, Judgment normal, Mood normal.     LABS  Results for orders placed or performed during the hospital encounter of 06/15/21   URINALYSIS (UA)    Specimen: Urine   Result Value Ref Range    Color Yellow     Character Clear     Specific Gravity 1.014 <1.035    Ph 6.5 5.0 - 8.0    Glucose Negative Negative mg/dL    Ketones Negative Negative mg/dL    Protein 100 (A) Negative mg/dL    Bilirubin Negative Negative    Urobilinogen, Urine 0.2 Negative    Nitrite Negative Negative    Leukocyte Esterase Moderate (A) Negative    Occult Blood Large (A) Negative    Micro Urine Req Microscopic    COMP METABOLIC PANEL   Result Value Ref Range    Sodium 127 (L) 135 - 145 mmol/L    Potassium 4.1 3.6 - 5.5 mmol/L    Chloride 96 96 - 112 mmol/L    Co2 22 20 - 33 mmol/L    Anion Gap 9.0 7.0 - 16.0    Glucose 172 (H) 65 - 99 mg/dL    Bun 15 8 - 22 mg/dL    Creatinine 0.57 0.50 - 1.40 mg/dL    Calcium 8.7 8.5 - 10.5 mg/dL    AST(SGOT) 37 12 - 45 U/L    ALT(SGPT) 24 2 - 50 U/L    Alkaline Phosphatase 159 (H) 30 - 99 U/L    Total Bilirubin 0.4 0.1 - 1.5 mg/dL    Albumin 3.0 (L) 3.2 - 4.9 g/dL    Total Protein 7.4 6.0 - 8.2 g/dL    Globulin 4.4 (H) 1.9 - 3.5 g/dL    A-G Ratio 0.7 g/dL   LIPASE   Result Value Ref Range    Lipase 24 11 - 82 U/L   LACTIC ACID   Result Value  Ref Range    Lactic Acid 1.1 0.5 - 2.0 mmol/L   ESTIMATED GFR   Result Value Ref Range    GFR If African American >60 >60 mL/min/1.73 m 2    GFR If Non African American >60 >60 mL/min/1.73 m 2   URINE MICROSCOPIC (W/UA)   Result Value Ref Range    WBC  (A) /hpf    RBC >150 (A) /hpf    Bacteria Negative None /hpf    Epithelial Cells Few /hpf    Epithelial Cells Renal Few /hpf    Hyaline Cast 3-5 (A) /lpf   CBC WITH DIFFERENTIAL   Result Value Ref Range    WBC 5.5 4.8 - 10.8 K/uL    RBC 3.14 (L) 4.20 - 5.40 M/uL    Hemoglobin 8.7 (L) 12.0 - 16.0 g/dL    Hematocrit 27.3 (L) 37.0 - 47.0 %    MCV 86.9 81.4 - 97.8 fL    MCH 27.7 27.0 - 33.0 pg    MCHC 31.9 (L) 33.6 - 35.0 g/dL    RDW 64.2 (H) 35.9 - 50.0 fL    MPV 10.6 9.0 - 12.9 fL    Neutrophils-Polys 79.30 (H) 44.00 - 72.00 %    Lymphocytes 8.00 (L) 22.00 - 41.00 %    Monocytes 10.50 0.00 - 13.40 %    Eosinophils 1.40 0.00 - 6.90 %    Basophils 0.40 0.00 - 1.80 %    Immature Granulocytes 0.40 0.00 - 0.90 %    Nucleated RBC 0.00 /100 WBC    Neutrophils (Absolute) 4.38 2.00 - 7.15 K/uL    Lymphs (Absolute) 0.44 (L) 1.00 - 4.80 K/uL    Monos (Absolute) 0.58 0.00 - 0.85 K/uL    Eos (Absolute) 0.08 0.00 - 0.51 K/uL    Baso (Absolute) 0.02 0.00 - 0.12 K/uL    Immature Granulocytes (abs) 0.02 0.00 - 0.11 K/uL    NRBC (Absolute) 0.00 K/uL   PERIPHERAL SMEAR REVIEW   Result Value Ref Range    Peripheral Smear Review see below    DIFFERENTIAL COMMENT   Result Value Ref Range    Comments-Diff see below      All labs reviewed by me.      RADIOLOGY  CT-ABDOMEN-PELVIS WITH   Final Result         1.  No acute abnormality.   2.  Hepatomegaly and diffuse hepatic steatosis   3.  Small bilateral pleural effusions and bilateral dependent atelectasis.   4.  Small fat-containing left inguinal hernia   5.  Cardiomegaly   6.  Diverticulosis   7.  Atherosclerosis and atherosclerotic coronary artery disease.        The radiologist's interpretation of all radiological studies have been  reviewed by me.    COURSE & MEDICAL DECISION MAKING  Pertinent Labs & Imaging studies reviewed. (See chart for details)    10:32 PM - Patient seen and examined at bedside. Ordered CT abdomen pelvis w/ IV contrast, CMP, lipase, CBC w/, LA, UA, eGFR to evaluate her symptoms. The differential diagnoses include but are not limited to: Urinary tract infection, sepsis, constipation, surgical abdomen    Discussed with the patient and her daughter that CT does not show any acute abnormalities.  The she does look like she has a urinary tract infection we will give her dose of IV Rocephin.  Patient does not appear to be septic.  Discussed pain control with Norco with the daughter.  She understands the start of a very low dose of half a tablet to 1 tablet every 6 hours.  Discussed using MiraLAX to help prevent constipation.    Medical Decision Making: Presents with abdominal pain after placement of Gomes catheter.  On CT and ultrasound the patient's Gomes catheter is in the right place.  Patient does look like she has a urinary tract infection but does not currently show any signs of sepsis therefore after IV antibiotics I think the patient can be discharged home.    I reviewed prescription monitoring program for patient's narcotic use before prescribing a scheduled drug.The patient will not drink alcohol nor drive with prescribed medications. The patient will return for new or worsening symptoms and is stable at the time of discharge.    The patient is referred to a primary physician for blood pressure management, diabetic screening, and for all other preventative health concerns.    In prescribing controlled substances to this patient, I certify that I have obtained and reviewed the medical history of Yoon Richards. I have also made a good kendra effort to obtain applicable records from other providers who have treated the patient and records did not demonstrate any increased risk of substance abuse that would prevent me  from prescribing controlled substances.     I have conducted a physical exam and documented it. I have reviewed Ms. Richards’s prescription history as maintained by the [place] Prescription Monitoring Program.     I have assessed the patient’s risk for abuse, dependency, and addiction using the validated Opioid Risk Tool available at https://www.mdcalc.com/eltcnn-plcj-tjxi-ort-narcotic-abuse.     Given the above, I believe the benefits of controlled substance therapy outweigh the risks. The reasons for prescribing controlled substances include non-narcotic, oral analgesic alternatives have been inadequate for pain control. Accordingly, I have discussed the risk and benefits, treatment plan, and alternative therapies with the patient.       DISPOSITION:  Patient will be discharged home in stable condition.    FOLLOW UP:  Radha Brady P.A.-C.  [address] [place] [id number] [phone]    Schedule an appointment as soon as possible for a visit in 2 days      OUTPATIENT MEDICATIONS:  New Prescriptions    HYDROCODONE-ACETAMINOPHEN (NORCO) 5-325 MG TAB PER TABLET    Take 0.5-1 Tablets by mouth every 6 hours as needed for up to 4 days.          FINAL IMPRESSION  1. Acute UTI    2. Urinary retention    3. Hematuria, unspecified type    4. Generalized abdominal pain          Chuck SIMON (Yenny), am scribing for, and in the presence of, Brendan Desir M.D.    Electronically signed by: Chuck Arellano (Yenny), 6/15/2021    Brendan SIMON M.D. personally performed the services described in this documentation, as scribed by Chuck Arellano in my presence, and it is both accurate and complete. C    The note accurately reflects work and decisions made by me.  Brendan Desir M.D.  6/16/2021  3:16 AM

## 2021-06-16 NOTE — ED TRIAGE NOTES
"Chief Complaint   Patient presents with   • Urinary Catheter Problem     Pt bib EMS from home where she lives with her daughter. Pt complaining of pain at site of new catheter that was placed today. Pt's daughter concerned with pink/bloody output and decreased urinary output. Pt's daughter also states that the patient is weak. Pt is currently on abx for a UTI. Pt has dementia at baseline A&Ox1, speaks Beninese and english, pt was also given fentanyl and zofran by EMS. Pt wears 2L NC at home due to CHF. 83% on RA.      BP (!) 187/83   Pulse 74   Temp 36 °C (96.8 °F) (Oral)   Resp 16   Ht 1.6 m (5' 3\")   Wt 59 kg (130 lb)   SpO2 99%   BMI 23.03 kg/m²     Daughter now at bedside.   "

## 2021-06-16 NOTE — CASE COMMUNICATION
ON CALL RN NOTE:    2045: Call received from daughter of patient, Elana, stating that Yoon is complaining of excessive pain in her belly and vagina from the echeverria catheter and is insisting it is removed. Elana states there is 100 mL of slight blood tinged urine in the collection bag. Patient is on eliquis. Agreed with patients decision to go to ED for excessive pain as patient is insisting on calling 911 and having it removed. ED visit seems prudent at this point for addititonal imaging or follow up related to discomfort. \    Thank you,    Matt Chino RN  [phone]

## 2021-06-18 ENCOUNTER — HOME CARE VISIT (OUTPATIENT)
Dept: HOME HEALTH SERVICES | Facility: HOME HEALTHCARE | Age: 86
End: 2021-06-18
Payer: MEDICARE

## 2021-06-18 LAB
BACTERIA UR CULT: NORMAL
SIGNIFICANT IND 70042: NORMAL
SITE SITE: NORMAL
SOURCE SOURCE: NORMAL

## 2021-06-18 PROCEDURE — G0493 RN CARE EA 15 MIN HH/HOSPICE: HCPCS

## 2021-06-18 ASSESSMENT — FIBROSIS 4 INDEX: FIB4 SCORE: 4.01

## 2021-06-19 ENCOUNTER — HOME CARE VISIT (OUTPATIENT)
Dept: HOME HEALTH SERVICES | Facility: HOME HEALTHCARE | Age: 86
End: 2021-06-19
Payer: MEDICARE

## 2021-06-20 ENCOUNTER — APPOINTMENT (OUTPATIENT)
Dept: RADIOLOGY | Facility: MEDICAL CENTER | Age: 86
DRG: 871 | End: 2021-06-20
Attending: EMERGENCY MEDICINE
Payer: MEDICARE

## 2021-06-20 ENCOUNTER — HOSPITAL ENCOUNTER (INPATIENT)
Facility: MEDICAL CENTER | Age: 86
LOS: 9 days | DRG: 871 | End: 2021-06-29
Attending: EMERGENCY MEDICINE | Admitting: HOSPITALIST
Payer: MEDICARE

## 2021-06-20 VITALS
BODY MASS INDEX: 24.2 KG/M2 | TEMPERATURE: 98 F | DIASTOLIC BLOOD PRESSURE: 80 MMHG | RESPIRATION RATE: 18 BRPM | OXYGEN SATURATION: 97 % | WEIGHT: 136.6 LBS | SYSTOLIC BLOOD PRESSURE: 121 MMHG | HEART RATE: 90 BPM

## 2021-06-20 DIAGNOSIS — I50.22 CHRONIC SYSTOLIC HEART FAILURE (HCC): ICD-10-CM

## 2021-06-20 DIAGNOSIS — R41.82 ALTERED MENTAL STATUS, UNSPECIFIED ALTERED MENTAL STATUS TYPE: ICD-10-CM

## 2021-06-20 DIAGNOSIS — E78.5 HYPERLIPIDEMIA, UNSPECIFIED HYPERLIPIDEMIA TYPE: ICD-10-CM

## 2021-06-20 DIAGNOSIS — E11.9 TYPE 2 DIABETES MELLITUS WITHOUT COMPLICATION, WITHOUT LONG-TERM CURRENT USE OF INSULIN (HCC): ICD-10-CM

## 2021-06-20 DIAGNOSIS — G93.41 SEPSIS WITH ENCEPHALOPATHY WITHOUT SEPTIC SHOCK, DUE TO UNSPECIFIED ORGANISM (HCC): ICD-10-CM

## 2021-06-20 DIAGNOSIS — A41.9 SEPSIS WITH ENCEPHALOPATHY WITHOUT SEPTIC SHOCK, DUE TO UNSPECIFIED ORGANISM (HCC): ICD-10-CM

## 2021-06-20 DIAGNOSIS — R77.1 ELEVATED SERUM GLOBULIN LEVEL: ICD-10-CM

## 2021-06-20 DIAGNOSIS — R65.20 SEPSIS WITH ENCEPHALOPATHY WITHOUT SEPTIC SHOCK, DUE TO UNSPECIFIED ORGANISM (HCC): ICD-10-CM

## 2021-06-20 DIAGNOSIS — J96.11 CHRONIC RESPIRATORY FAILURE WITH HYPOXIA (HCC): ICD-10-CM

## 2021-06-20 DIAGNOSIS — R10.84 GENERALIZED ABDOMINAL PAIN: ICD-10-CM

## 2021-06-20 DIAGNOSIS — F03.90 DEMENTIA WITHOUT BEHAVIORAL DISTURBANCE, UNSPECIFIED DEMENTIA TYPE: ICD-10-CM

## 2021-06-20 DIAGNOSIS — N30.90 CYSTITIS: ICD-10-CM

## 2021-06-20 PROBLEM — I50.42 CHRONIC COMBINED SYSTOLIC AND DIASTOLIC CONGESTIVE HEART FAILURE (HCC): Status: ACTIVE | Noted: 2021-06-20

## 2021-06-20 LAB
ALBUMIN SERPL BCP-MCNC: 2.4 G/DL (ref 3.2–4.9)
ALBUMIN/GLOB SERPL: 0.5 G/DL
ALP SERPL-CCNC: 121 U/L (ref 30–99)
ALT SERPL-CCNC: 9 U/L (ref 2–50)
ANION GAP SERPL CALC-SCNC: 9 MMOL/L (ref 7–16)
APPEARANCE UR: CLEAR
APTT PPP: 38.3 SEC (ref 24.7–36)
AST SERPL-CCNC: 15 U/L (ref 12–45)
BACTERIA #/AREA URNS HPF: NEGATIVE /HPF
BASOPHILS # BLD AUTO: 0.2 % (ref 0–1.8)
BASOPHILS # BLD: 0.03 K/UL (ref 0–0.12)
BILIRUB SERPL-MCNC: 1 MG/DL (ref 0.1–1.5)
BILIRUB UR QL STRIP.AUTO: NEGATIVE
BUN SERPL-MCNC: 16 MG/DL (ref 8–22)
CALCIUM SERPL-MCNC: 8.2 MG/DL (ref 8.5–10.5)
CHLORIDE SERPL-SCNC: 94 MMOL/L (ref 96–112)
CO2 SERPL-SCNC: 22 MMOL/L (ref 20–33)
COLOR UR: ABNORMAL
COMMENT 1642: NORMAL
CREAT SERPL-MCNC: 0.63 MG/DL (ref 0.5–1.4)
EKG IMPRESSION: NORMAL
EOSINOPHIL # BLD AUTO: 0 K/UL (ref 0–0.51)
EOSINOPHIL NFR BLD: 0 % (ref 0–6.9)
EPI CELLS #/AREA URNS HPF: ABNORMAL /HPF
ERYTHROCYTE [DISTWIDTH] IN BLOOD BY AUTOMATED COUNT: 64.4 FL (ref 35.9–50)
FLUAV RNA SPEC QL NAA+PROBE: NEGATIVE
FLUBV RNA SPEC QL NAA+PROBE: NEGATIVE
GLOBULIN SER CALC-MCNC: 4.6 G/DL (ref 1.9–3.5)
GLUCOSE BLD-MCNC: 123 MG/DL (ref 65–99)
GLUCOSE BLD-MCNC: 141 MG/DL (ref 65–99)
GLUCOSE SERPL-MCNC: 161 MG/DL (ref 65–99)
GLUCOSE UR STRIP.AUTO-MCNC: NEGATIVE MG/DL
HCT VFR BLD AUTO: 30.5 % (ref 37–47)
HGB BLD-MCNC: 9.7 G/DL (ref 12–16)
HYALINE CASTS #/AREA URNS LPF: ABNORMAL /LPF
IMM GRANULOCYTES # BLD AUTO: 0.09 K/UL (ref 0–0.11)
IMM GRANULOCYTES NFR BLD AUTO: 0.6 % (ref 0–0.9)
INR PPP: 1.68 (ref 0.87–1.13)
KETONES UR STRIP.AUTO-MCNC: ABNORMAL MG/DL
LACTATE BLD-SCNC: 1.3 MMOL/L (ref 0.5–2)
LACTATE BLD-SCNC: 1.4 MMOL/L (ref 0.5–2)
LACTATE BLD-SCNC: 1.4 MMOL/L (ref 0.5–2)
LEUKOCYTE ESTERASE UR QL STRIP.AUTO: ABNORMAL
LYMPHOCYTES # BLD AUTO: 0.48 K/UL (ref 1–4.8)
LYMPHOCYTES NFR BLD: 3.4 % (ref 22–41)
MCH RBC QN AUTO: 27.5 PG (ref 27–33)
MCHC RBC AUTO-ENTMCNC: 31.8 G/DL (ref 33.6–35)
MCV RBC AUTO: 86.4 FL (ref 81.4–97.8)
MICRO URNS: ABNORMAL
MONOCYTES # BLD AUTO: 0.99 K/UL (ref 0–0.85)
MONOCYTES NFR BLD AUTO: 7 % (ref 0–13.4)
MORPHOLOGY BLD-IMP: NORMAL
NEUTROPHILS # BLD AUTO: 12.65 K/UL (ref 2–7.15)
NEUTROPHILS NFR BLD: 88.8 % (ref 44–72)
NITRITE UR QL STRIP.AUTO: NEGATIVE
NRBC # BLD AUTO: 0 K/UL
NRBC BLD-RTO: 0 /100 WBC
PH UR STRIP.AUTO: 5.5 [PH] (ref 5–8)
PLATELET # BLD AUTO: 116 K/UL (ref 164–446)
PMV BLD AUTO: 11 FL (ref 9–12.9)
POTASSIUM SERPL-SCNC: 3.8 MMOL/L (ref 3.6–5.5)
PROT SERPL-MCNC: 7 G/DL (ref 6–8.2)
PROT UR QL STRIP: 300 MG/DL
PROTHROMBIN TIME: 19.2 SEC (ref 12–14.6)
RBC # BLD AUTO: 3.53 M/UL (ref 4.2–5.4)
RBC # URNS HPF: ABNORMAL /HPF
RBC UR QL AUTO: ABNORMAL
RSV RNA SPEC QL NAA+PROBE: NEGATIVE
SARS-COV-2 RNA RESP QL NAA+PROBE: NOTDETECTED
SODIUM SERPL-SCNC: 125 MMOL/L (ref 135–145)
SP GR UR STRIP.AUTO: 1.02
SPECIMEN SOURCE: NORMAL
TROPONIN T SERPL-MCNC: 61 NG/L (ref 6–19)
UROBILINOGEN UR STRIP.AUTO-MCNC: 1 MG/DL
WBC # BLD AUTO: 14.2 K/UL (ref 4.8–10.8)
WBC #/AREA URNS HPF: ABNORMAL /HPF

## 2021-06-20 PROCEDURE — 96365 THER/PROPH/DIAG IV INF INIT: CPT

## 2021-06-20 PROCEDURE — 87086 URINE CULTURE/COLONY COUNT: CPT

## 2021-06-20 PROCEDURE — 0241U HCHG SARS-COV-2 COVID-19 NFCT DS RESP RNA 4 TRGT MIC: CPT

## 2021-06-20 PROCEDURE — 84484 ASSAY OF TROPONIN QUANT: CPT

## 2021-06-20 PROCEDURE — 85025 COMPLETE CBC W/AUTO DIFF WBC: CPT

## 2021-06-20 PROCEDURE — 700111 HCHG RX REV CODE 636 W/ 250 OVERRIDE (IP): Performed by: EMERGENCY MEDICINE

## 2021-06-20 PROCEDURE — 700102 HCHG RX REV CODE 250 W/ 637 OVERRIDE(OP): Performed by: HOSPITALIST

## 2021-06-20 PROCEDURE — 99285 EMERGENCY DEPT VISIT HI MDM: CPT

## 2021-06-20 PROCEDURE — 36415 COLL VENOUS BLD VENIPUNCTURE: CPT

## 2021-06-20 PROCEDURE — 87040 BLOOD CULTURE FOR BACTERIA: CPT | Mod: 91

## 2021-06-20 PROCEDURE — 700102 HCHG RX REV CODE 250 W/ 637 OVERRIDE(OP): Performed by: STUDENT IN AN ORGANIZED HEALTH CARE EDUCATION/TRAINING PROGRAM

## 2021-06-20 PROCEDURE — 700111 HCHG RX REV CODE 636 W/ 250 OVERRIDE (IP): Performed by: HOSPITALIST

## 2021-06-20 PROCEDURE — 85610 PROTHROMBIN TIME: CPT

## 2021-06-20 PROCEDURE — 700105 HCHG RX REV CODE 258: Performed by: EMERGENCY MEDICINE

## 2021-06-20 PROCEDURE — 70450 CT HEAD/BRAIN W/O DYE: CPT | Mod: ME

## 2021-06-20 PROCEDURE — 71045 X-RAY EXAM CHEST 1 VIEW: CPT

## 2021-06-20 PROCEDURE — 85730 THROMBOPLASTIN TIME PARTIAL: CPT

## 2021-06-20 PROCEDURE — 93005 ELECTROCARDIOGRAM TRACING: CPT | Performed by: EMERGENCY MEDICINE

## 2021-06-20 PROCEDURE — C9803 HOPD COVID-19 SPEC COLLECT: HCPCS | Performed by: EMERGENCY MEDICINE

## 2021-06-20 PROCEDURE — 82962 GLUCOSE BLOOD TEST: CPT

## 2021-06-20 PROCEDURE — 80053 COMPREHEN METABOLIC PANEL: CPT

## 2021-06-20 PROCEDURE — 700117 HCHG RX CONTRAST REV CODE 255: Performed by: EMERGENCY MEDICINE

## 2021-06-20 PROCEDURE — 770006 HCHG ROOM/CARE - MED/SURG/GYN SEMI*

## 2021-06-20 PROCEDURE — 74177 CT ABD & PELVIS W/CONTRAST: CPT | Mod: MG

## 2021-06-20 PROCEDURE — 83605 ASSAY OF LACTIC ACID: CPT

## 2021-06-20 PROCEDURE — 99223 1ST HOSP IP/OBS HIGH 75: CPT | Mod: AI | Performed by: HOSPITALIST

## 2021-06-20 PROCEDURE — 81001 URINALYSIS AUTO W/SCOPE: CPT

## 2021-06-20 PROCEDURE — A9270 NON-COVERED ITEM OR SERVICE: HCPCS | Performed by: STUDENT IN AN ORGANIZED HEALTH CARE EDUCATION/TRAINING PROGRAM

## 2021-06-20 PROCEDURE — A9270 NON-COVERED ITEM OR SERVICE: HCPCS | Performed by: HOSPITALIST

## 2021-06-20 PROCEDURE — 96375 TX/PRO/DX INJ NEW DRUG ADDON: CPT

## 2021-06-20 PROCEDURE — 700105 HCHG RX REV CODE 258: Performed by: HOSPITALIST

## 2021-06-20 RX ORDER — OXYCODONE HYDROCHLORIDE 5 MG/1
5 TABLET ORAL
Status: DISCONTINUED | OUTPATIENT
Start: 2021-06-20 | End: 2021-06-28

## 2021-06-20 RX ORDER — SODIUM CHLORIDE 9 MG/ML
INJECTION, SOLUTION INTRAVENOUS CONTINUOUS
Status: DISCONTINUED | OUTPATIENT
Start: 2021-06-20 | End: 2021-06-21

## 2021-06-20 RX ORDER — INSULIN LISPRO 100 [IU]/ML
0.12 INJECTION, SOLUTION INTRAVENOUS; SUBCUTANEOUS
Status: DISCONTINUED | OUTPATIENT
Start: 2021-06-20 | End: 2021-06-21

## 2021-06-20 RX ORDER — OXYCODONE HYDROCHLORIDE 5 MG/1
2.5 TABLET ORAL
Status: DISCONTINUED | OUTPATIENT
Start: 2021-06-20 | End: 2021-06-28

## 2021-06-20 RX ORDER — DEXTROSE MONOHYDRATE 25 G/50ML
50 INJECTION, SOLUTION INTRAVENOUS
Status: DISCONTINUED | OUTPATIENT
Start: 2021-06-20 | End: 2021-06-29 | Stop reason: HOSPADM

## 2021-06-20 RX ORDER — OXYBUTYNIN CHLORIDE 5 MG/1
5 TABLET ORAL 2 TIMES DAILY
Status: DISCONTINUED | OUTPATIENT
Start: 2021-06-20 | End: 2021-06-20

## 2021-06-20 RX ORDER — CEFDINIR 300 MG/1
300 CAPSULE ORAL 2 TIMES DAILY
Status: ON HOLD | COMMUNITY
Start: 2021-06-15 | End: 2021-06-29

## 2021-06-20 RX ORDER — ATORVASTATIN CALCIUM 20 MG/1
20 TABLET, FILM COATED ORAL NIGHTLY
Status: DISCONTINUED | OUTPATIENT
Start: 2021-06-20 | End: 2021-06-29 | Stop reason: HOSPADM

## 2021-06-20 RX ORDER — MORPHINE SULFATE 4 MG/ML
2 INJECTION, SOLUTION INTRAMUSCULAR; INTRAVENOUS
Status: DISCONTINUED | OUTPATIENT
Start: 2021-06-20 | End: 2021-06-28

## 2021-06-20 RX ORDER — POLYETHYLENE GLYCOL 3350 17 G/17G
1 POWDER, FOR SOLUTION ORAL 2 TIMES DAILY PRN
Status: DISCONTINUED | OUTPATIENT
Start: 2021-06-20 | End: 2021-06-23

## 2021-06-20 RX ORDER — LORATADINE 10 MG/1
10 TABLET ORAL
COMMUNITY

## 2021-06-20 RX ORDER — SODIUM CHLORIDE, SODIUM LACTATE, POTASSIUM CHLORIDE, AND CALCIUM CHLORIDE .6; .31; .03; .02 G/100ML; G/100ML; G/100ML; G/100ML
1000 INJECTION, SOLUTION INTRAVENOUS ONCE
Status: COMPLETED | OUTPATIENT
Start: 2021-06-20 | End: 2021-06-20

## 2021-06-20 RX ORDER — INSULIN LISPRO 100 [IU]/ML
1-6 INJECTION, SOLUTION INTRAVENOUS; SUBCUTANEOUS
Status: DISCONTINUED | OUTPATIENT
Start: 2021-06-20 | End: 2021-06-29 | Stop reason: HOSPADM

## 2021-06-20 RX ORDER — CARVEDILOL 6.25 MG/1
3.12 TABLET ORAL 2 TIMES DAILY WITH MEALS
Status: DISCONTINUED | OUTPATIENT
Start: 2021-06-20 | End: 2021-06-29 | Stop reason: HOSPADM

## 2021-06-20 RX ORDER — LOSARTAN POTASSIUM 50 MG/1
25 TABLET ORAL DAILY
Status: DISCONTINUED | OUTPATIENT
Start: 2021-06-20 | End: 2021-06-29 | Stop reason: HOSPADM

## 2021-06-20 RX ADMIN — CARVEDILOL 3.12 MG: 6.25 TABLET, FILM COATED ORAL at 18:07

## 2021-06-20 RX ADMIN — INSULIN GLARGINE 10 UNITS: 100 INJECTION, SOLUTION SUBCUTANEOUS at 18:11

## 2021-06-20 RX ADMIN — SODIUM CHLORIDE, POTASSIUM CHLORIDE, SODIUM LACTATE AND CALCIUM CHLORIDE 1000 ML: 600; 310; 30; 20 INJECTION, SOLUTION INTRAVENOUS at 10:16

## 2021-06-20 RX ADMIN — ATORVASTATIN CALCIUM 20 MG: 20 TABLET, FILM COATED ORAL at 21:46

## 2021-06-20 RX ADMIN — IOHEXOL 85 ML: 350 INJECTION, SOLUTION INTRAVENOUS at 10:44

## 2021-06-20 RX ADMIN — SODIUM CHLORIDE 3 G: 900 INJECTION INTRAVENOUS at 19:29

## 2021-06-20 RX ADMIN — SODIUM CHLORIDE: 9 INJECTION, SOLUTION INTRAVENOUS at 15:49

## 2021-06-20 RX ADMIN — INSULIN LISPRO 3 UNITS: 100 INJECTION, SOLUTION INTRAVENOUS; SUBCUTANEOUS at 18:11

## 2021-06-20 RX ADMIN — SODIUM CHLORIDE 3 G: 900 INJECTION INTRAVENOUS at 13:33

## 2021-06-20 RX ADMIN — APIXABAN 2.5 MG: 5 TABLET, FILM COATED ORAL at 18:07

## 2021-06-20 RX ADMIN — LOSARTAN POTASSIUM 25 MG: 50 TABLET, FILM COATED ORAL at 15:52

## 2021-06-20 RX ADMIN — CEFTRIAXONE SODIUM 1 G: 10 INJECTION, POWDER, FOR SOLUTION INTRAVENOUS at 10:54

## 2021-06-20 RX ADMIN — OXYCODONE 5 MG: 5 TABLET ORAL at 16:43

## 2021-06-20 ASSESSMENT — COGNITIVE AND FUNCTIONAL STATUS - GENERAL
SUGGESTED CMS G CODE MODIFIER DAILY ACTIVITY: CL
TOILETING: A LOT
DAILY ACTIVITIY SCORE: 12
DRESSING REGULAR LOWER BODY CLOTHING: A LOT
TURNING FROM BACK TO SIDE WHILE IN FLAT BAD: A LOT
DRESSING REGULAR UPPER BODY CLOTHING: A LOT
WALKING IN HOSPITAL ROOM: A LOT
MOVING FROM LYING ON BACK TO SITTING ON SIDE OF FLAT BED: A LOT
MOVING TO AND FROM BED TO CHAIR: A LOT
MOBILITY SCORE: 12
SUGGESTED CMS G CODE MODIFIER MOBILITY: CL
PERSONAL GROOMING: A LOT
EATING MEALS: A LOT
CLIMB 3 TO 5 STEPS WITH RAILING: A LOT
HELP NEEDED FOR BATHING: A LOT
STANDING UP FROM CHAIR USING ARMS: A LOT

## 2021-06-20 ASSESSMENT — ENCOUNTER SYMPTOMS
NAUSEA: DENIES
MUSCLE WEAKNESS: 1
VOMITING: DENIES
SHORTNESS OF BREATH: T

## 2021-06-20 ASSESSMENT — CHA2DS2 SCORE
AGE 65 TO 74: NO
PRIOR STROKE OR TIA OR THROMBOEMBOLISM: NO
HYPERTENSION: YES
CHF OR LEFT VENTRICULAR DYSFUNCTION: YES
SEX: FEMALE
CHA2DS2 VASC SCORE: 6
DIABETES: YES
VASCULAR DISEASE: NO
AGE 75 OR GREATER: YES

## 2021-06-20 ASSESSMENT — LIFESTYLE VARIABLES
HOW MANY TIMES IN THE PAST YEAR HAVE YOU HAD 5 OR MORE DRINKS IN A DAY: 0
EVER HAD A DRINK FIRST THING IN THE MORNING TO STEADY YOUR NERVES TO GET RID OF A HANGOVER: NO
CONSUMPTION TOTAL: NEGATIVE
EVER FELT BAD OR GUILTY ABOUT YOUR DRINKING: NO
TOTAL SCORE: 0
ALCOHOL_USE: NO
HAVE PEOPLE ANNOYED YOU BY CRITICIZING YOUR DRINKING: NO
ON A TYPICAL DAY WHEN YOU DRINK ALCOHOL HOW MANY DRINKS DO YOU HAVE: 0
AVERAGE NUMBER OF DAYS PER WEEK YOU HAVE A DRINK CONTAINING ALCOHOL: 0
TOTAL SCORE: 0
TOTAL SCORE: 0
HAVE YOU EVER FELT YOU SHOULD CUT DOWN ON YOUR DRINKING: NO
DOES PATIENT WANT TO STOP DRINKING: NO

## 2021-06-20 ASSESSMENT — PATIENT HEALTH QUESTIONNAIRE - PHQ9
2. FEELING DOWN, DEPRESSED, IRRITABLE, OR HOPELESS: NOT AT ALL
1. LITTLE INTEREST OR PLEASURE IN DOING THINGS: NOT AT ALL
SUM OF ALL RESPONSES TO PHQ9 QUESTIONS 1 AND 2: 0

## 2021-06-20 ASSESSMENT — PAIN DESCRIPTION - PAIN TYPE
TYPE: ACUTE PAIN
TYPE: ACUTE PAIN

## 2021-06-20 ASSESSMENT — ACTIVITIES OF DAILY LIVING (ADL)
CURRENT_FUNCTION: STAND BY ASSIST
AMBULATION ASSISTANCE: STAND BY ASSIST

## 2021-06-20 ASSESSMENT — FIBROSIS 4 INDEX
FIB4 SCORE: 4.01
FIB4 SCORE: 2.51

## 2021-06-20 NOTE — ASSESSMENT & PLAN NOTE
This is Sepsis Present on admission  SIRS criteria identified on my evaluation include: Fever, with temperature greater than 101 deg F, Tachypnea, with respirations greater than 20 per minute and Leukocytosis, with WBC greater than 12,000  Source - Pneumonia vs Proctitis  Fluid resuscitation ordered per protocol  IV antibiotics as appropriate for source of sepsis  While organ dysfunction may be noted elsewhere in this problem list or in the chart, degree of organ dysfunction does not meet CMS criteria for severe sepsis

## 2021-06-20 NOTE — CARE PLAN
The patient is Watcher - Medium risk of patient condition declining or worsening    Shift Goals  Clinical Goals: safety    Progress made toward(s) clinical / shift goals:  Use of all fall precautions    Patient is not progressing towards the following goals:

## 2021-06-20 NOTE — PROGRESS NOTES
Received report from ED.  Pt arrived to T415 bed 2 via gurney  No immediate distress.  A&Ox2 (disoriented to time and situation)  2L O2 nasal cannula  No complaints of pain  Denies n/v  Gomes in place  PIV 18RW/18LFA    Oriented to room, educated on welcome packet, white board, TV, call light, call before fall, plan of care, oral care expectations, ambulation goals, and up to chair for all meals goal.  Call light and personal belongings within reach.  Fall precautions and hourly rounding in place

## 2021-06-20 NOTE — ED TRIAGE NOTES
Yoon Richards  89 y.o.  female  Chief Complaint   Patient presents with   • Blood Infection     ALOC today per family - pt normally GCS 14, now responding minimally to questions. New bilateral edema to legs since yesterday & ++ distended abdomen. Discharged from [facility] 5 days ago for urinary retention, catheter placed. Small amount of dark urine in bag currently. Pt febrile at home.     Given 500mL NS with EMS, taking abx at home since discharge on [facility] 15 for possible UTI.

## 2021-06-20 NOTE — H&P
Hospital Medicine History & Physical Note    Date of Service  6/20/2021    Primary Care Physician  Radha Brady P.A.-C.    Consultants  None    Code Status  Prior    Chief Complaint  Chief Complaint   Patient presents with   • Blood Infection     ALOC today per family - pt normally GCS 14, now responding minimally to questions. New bilateral edema to legs since yesterday & ++ distended abdomen. Discharged from [facility] 5 days ago for urinary retention, catheter placed. Small amount of dark urine in bag currently. Pt febrile at home.       History of Presenting Illness  89 y.o. female who presented 6/20/2021 with several days of worsening lethargy.  Patient's daughter at bedside notes that she came into the emergency department approximately 1 week ago at that time was found to have urinary retention.  A Gomes catheter was placed and patient was sent back home.  She was to follow-up with outpatient urology this coming Thursday, however, she had worsening lethargy.  At baseline patient is ANO x3, with some perhaps early dementia and forgetfulness but nevertheless alert interactive jocular.  Over the past several days she has had a decrease in her oral intake, increasing lethargy, sleeping most of the day, and not meeting her nutritional nor hydration needs.  She became increasingly nonverbal for which her family brought her to the emergency department today for further evaluation.  On evaluation she was noted to have CT findings per below, new white count, new fever, tachypnea consistent with simple sepsis.  Unfortunately, patient is arousable but not able to provide reliable HPI at this juncture.    Review of Systems  All systems reviewed and negative except as noted per above.    Past Medical History   has a past medical history of CAD (coronary artery disease), Chronic systolic heart failure (HCC), Dementia (HCC), Diabetes (HCC), High cholesterol, and Hypertension.    Surgical History   has a past surgical history  that includes coronary artery bypas, 4 and cholecystectomy.     Family History  family history is not on file.     Social History   reports that she has quit smoking. She has never used smokeless tobacco. She reports that she does not drink alcohol and does not use drugs.    Allergies  Allergies   Allergen Reactions   • Ciprofloxacin Itching   • Donepezil Vomiting   • Hydrochlorothiazide Itching   • Sulfa Drugs Itching   • Trimethoprim Itching       Medications  Prior to Admission Medications   Prescriptions Last Dose Informant Patient Reported? Taking?   Calcium Carb-Cholecalciferol (OYSTER SHELL CALCIUM/VITAMIN D) 250-125 MG-UNIT Tab tablet 6/19/2021 at 1200 Rx Bottle (For Med Information) Yes No   Sig: Take 1 tablet by mouth every day.   HYDROcodone-acetaminophen (NORCO) 5-325 MG Tab per tablet unknown at Unknown time  No No   Sig: Take 0.5-1 Tablets by mouth every 6 hours as needed for up to 4 days.   Home Care Oxygen unknown at Unknown time Family Member Yes No   Sig: Inhale 2 L/min continuous. Oxygen dose range: 1 L/min  Respiratory route via: Nasal Cannula   Oxygen supplier: [facility]       Iron-Vit C-Vit B12-Folic Acid (IRON 100 PLUS PO) 6/19/2021 at 1200 Rx Bottle (For Med Information) Yes No   Sig: Take 1 tablet by mouth every day.   acetaminophen (TYLENOL) 650 MG CR tablet unknown at Unknown time Family Member Yes No   Sig: Take 650 mg by mouth every 6 hours as needed for Moderate Pain. Indications: Fever, Pain   apixaban (ELIQUIS) 2.5mg Tab 6/19/2021 at 2130 Rx Bottle (For Med Information) No No   Sig: Take 1 tablet by mouth 2 times a day.   atorvastatin (LIPITOR) 20 MG Tab 6/19/2021 at 2130 Rx Bottle (For Med Information) Yes No   Sig: Take 20 mg by mouth every evening.   carvedilol (COREG) 3.125 MG Tab 6/19/2021 at 2130 Rx Bottle (For Med Information) No No   Sig: Take 1 tablet by mouth 2 times a day with meals.   cefdinir (OMNICEF) 300 MG Cap 6/19/2021 at 2130  Yes Yes   Sig: Take 300 mg by mouth 2  times a day. For 7 days   furosemide (LASIX) 20 MG Tab 6/18/2021 at Unknown time Rx Bottle (For Med Information) No No   Sig: Take 1 tablet by mouth every Mon, Wed, Fri, Sat, and Sun at 6 PM. Saturday & Sunday as needed   Patient taking differently: Take 20 mg by mouth every Monday, Wednesday, and Friday.   linagliptin (TRADJENTA) 5 MG Tab tablet 6/19/2021 at am  No No   Sig: Take 1 tablet by mouth every day. Indications: Type 2 Diabetes   loratadine (CLARITIN) 10 MG Tab 6/19/2021 at Unknown time  Yes Yes   Sig: Take 10 mg by mouth 1 time a day as needed (for itching).   losartan (COZAAR) 25 MG Tab 6/19/2021 at am Rx Bottle (For Med Information) No No   Sig: Take 1 tablet by mouth every day.   oxybutynin (DITROPAN) 5 MG Tab STOPPED at 6/15/21  No No   Sig: Take 1 tablet by mouth 2 times a day.   polyethylene glycol/lytes (MIRALAX) 17 g Pack 6/19/2021 at Unknown time Family Member Yes No   Sig: Take 17 g by mouth 2 times a day as needed (constipation). Indications: Constipation   thiamine (VITAMIN B-1) 100 MG Tab 6/19/2021 at 1200 Rx Bottle (For Med Information) Yes No   Sig: Take 100 mg by mouth every day.   vitamin D (CHOLECALCIFEROL) 1000 Unit (25 mcg) Tab 6/19/2021 at 1200 Rx Bottle (For Med Information) Yes No   Sig: Take 1,000 Units by mouth every day.      Facility-Administered Medications: None       Physical Exam  Temp:  [37.6 °C (99.7 °F)-38.4 °C (101.2 °F)] 37.6 °C (99.7 °F)  Pulse:  [94-98] 94  Resp:  [21-28] 21  BP: (152-166)/(72-81) 152/75  SpO2:  [97 %-100 %] 100 %    General: No acute distress  HEENT atraumatic, normocephalic, pupils equal round reactive to light  Neck: No JVD  Chest: Respirations are unlabored  Cardiac: Physiologic S1 and S2  Abdomen: Soft, nontender, nondistended  Extremities: Without clubbing, cyanosis or edema  Neuro: Cranial nerves II through XII are grossly intact.  Psych: Unable to be assessed.        Laboratory:  Recent Labs     06/20/21  0956   WBC 14.2*   RBC 3.53*    HEMOGLOBIN 9.7*   HEMATOCRIT 30.5*   MCV 86.4   MCH 27.5   MCHC 31.8*   RDW 64.4*   PLATELETCT 116*   MPV 11.0     Recent Labs     06/20/21  0956   SODIUM 125*   POTASSIUM 3.8   CHLORIDE 94*   CO2 22   GLUCOSE 161*   BUN 16   CREATININE 0.63   CALCIUM 8.2*     Recent Labs     06/20/21  0956   ALTSGPT 9   ASTSGOT 15   ALKPHOSPHAT 121*   TBILIRUBIN 1.0   GLUCOSE 161*     Recent Labs     06/20/21  1045   APTT 38.3*   INR 1.68*     No results for input(s): NTPROBNP in the last 72 hours.      No results for input(s): TROPONINT in the last 72 hours.    Imaging:  CT-ABDOMEN-PELVIS WITH   Final Result      1.  Wall thickening of the rectum is noted and there is induration around the perirectal space. Findings could be due to proctitis.      2.  Small right pleural effusion is slightly larger than on prior exam.      3.  Consolidation and volume loss is again noted in each lung base.      4.  Cardiomegaly is again noted.         CT-HEAD W/O   Final Result         NO ACUTE ABNORMALITIES ARE NOTED ON CT SCAN OF THE HEAD.      Findings are consistent with atrophy.  Decreased attenuation in the periventricular white matter likely indicates microvascular ischemic disease.      DX-CHEST-PORTABLE (1 VIEW)   Final Result         Ill-defined opacifications in each lung have increased compared to the prior radiograph.  This could indicate worsening of pulmonary edema or inflammation.            Assessment/Plan:  I anticipate this patient will require at least two midnights for appropriate medical management, necessitating inpatient admission.    Sepsis (HCC)  Assessment & Plan  This is Sepsis Present on admission  SIRS criteria identified on my evaluation include: Fever, with temperature greater than 101 deg F, Tachypnea, with respirations greater than 20 per minute and Leukocytosis, with WBC greater than 12,000  Source is Fluid responsive, manifest by elevated white count, tachypnea and fever on admission.  Antibiotics for  suspected source of intra-abdominal.  Perirectal wall thickening noted on CT scan c/w proctitis.   Sepsis protocol initiated  Fluid resuscitation ordered per protocol  IV antibiotics as appropriate for source of sepsis  While organ dysfunction may be noted elsewhere in this problem list or in the chart, degree of organ dysfunction does not meet CMS criteria for severe sepsis          Chronic combined systolic and diastolic congestive heart failure (HCC)  Assessment & Plan  Heart failure does not appear exacerbated at this juncture, patient appears clinically dry and will be given IV fluids in accordance with sepsis protocol, monitor carefully, stop fluids if any signs of volume overload develop.    Gait instability- (present on admission)  Assessment & Plan  PT/OT Evals will be obtained.     Urinary retention- (present on admission)  Assessment & Plan  Con't cath placed last week.  CTM.  Con't home dose ditropan.  Consider voiding trial in 1-2 days once sepsis physiology resolves.      Hyponatremia- (present on admission)  Assessment & Plan  IVF per orders for sepsis.  Urine studies ordered.      Paroxysmal A-fib ([facility])- (present on admission)  Assessment & Plan  Continue home dose anticoagulation, continue home dose beta-blockade.    Hypertension- (present on admission)  Assessment & Plan  Continue home dose medications with hold parameters.    DM (diabetes mellitus) ([facility])- (present on admission)  Assessment & Plan  Check a hemoglobin A1c, basal, prandial, correctional insulins titrated daily to goal glucose of .

## 2021-06-20 NOTE — ED NOTES
Pt continues to rest with eyes closed, resps easy. Pt opening eyes to voice. Renetta urine draining from catheter bag - approx 200mL since bag changed at arrival to ED.

## 2021-06-20 NOTE — ED NOTES
Personal care provided - pt had a moderate soft BM. Skin on back & perineal area intact. Brief applied & pt repositioned.

## 2021-06-20 NOTE — ED PROVIDER NOTES
ED Provider Note    CHIEF COMPLAINT  Chief Complaint   Patient presents with   • Blood Infection     ALOC today per family - pt normally GCS 14, now responding minimally to questions. New bilateral edema to legs since yesterday & ++ distended abdomen. Discharged from [facility] 5 days ago for urinary retention, catheter placed. Small amount of dark urine in bag currently. Pt febrile at home.       HPI  Yoon Richards is a 89 y.o. female with a history of dementia, coronary artery disease, status post CABG, congestive heart failure, type 2 diabetes mellitus, hypertension, hypercholesterolemia, who presents with complaints of fever and altered mental status.  The patient has had poor oral intake over the past several days and daughter says she has been trying to push fluids.  Patient was talking and was able to eat a small amount and drink fluids yesterday.  This morning she has been very altered, confused, and somnolent.  Patient has had problems with her abdomen, has not had a bowel movement in a number of days.  She has had problems with abdominal pain and distention for the last month.  She is recently diagnosed with urinary retention had a Gomes catheter placed about 5 days ago in the ER.  She was placed on antibiotics for possible urinary tract infection, but the urine culture was negative.  The patient is unable to give any information at this time, and all information is obtained from chart review and family.    REVIEW OF SYSTEMS  See HPI for further details. All other systems are negative.     PAST MEDICAL HISTORY  Past Medical History:   Diagnosis Date   • CAD (coronary artery disease)    • Chronic systolic heart failure (HCC)    • Dementia (HCC)    • Diabetes (HCC)    • High cholesterol    • Hypertension        FAMILY HISTORY  No family history on file.    SOCIAL HISTORY  Social History     Tobacco Use   • Smoking status: Former Smoker   • Smokeless tobacco: Never Used   Vaping Use   • Vaping Use: Never used  "  Substance Use Topics   • Alcohol use: Never   • Drug use: Never      Social History     Substance and Sexual Activity   Drug Use Never       SURGICAL HISTORY  Past Surgical History:   Procedure Laterality Date   • CHOLECYSTECTOMY     • CORONARY ARTERY BYPAS, 4         CURRENT MEDICATIONS  Home Medications    **Home medications have not yet been reviewed for this encounter**         ALLERGIES  Allergies   Allergen Reactions   • Ciprofloxacin Itching   • Donepezil Vomiting   • Hydrochlorothiazide Itching   • Sulfa Drugs Itching   • Trimethoprim Itching       PHYSICAL EXAM0  VITAL SIGNS: Blood Pressure 152/75   Pulse 94   Temperature 37.6 °C (99.7 °F) (Temporal)   Respiration (Abnormal) 21   Height 1.6 m (5' 3\")   Weight 62.6 kg (138 lb)   Oxygen Saturation 100%   Body Mass Index 24.45 kg/m²   Constitutional: Somnolent, opens eyes to voice, mumbles a few words, mildly ill-appearing.   HENT: Atraumatic. Bilateral external ears normal, mucous membranes dry, throat nonerythematous without exudates, nose is normal.  Eyes: PERRL, EOMI, conjunctiva moist, noninjected.  Neck: Nontender, Normal range of motion, No nuchal rigidity, No stridor.   Lymphatic: No lymphadenopathy noted.   Cardiovascular: Regular rate and rhythm, no murmurs, rubs, gallops.  Thorax & Lungs:  Good breath sounds bilaterally, no wheezes, rales, or retractions.  No chest tenderness.  Abdomen: Decreased bowel sounds, abdomen is soft, mildly distended, nontympanic, patient grimaces on palpation, with no obvious focal tenderness.  Back: No CVA or spinal tenderness.  Extremities: Intact distal pulses, No edema, No tenderness.   Skin: Warm, Dry, No rashes.   Musculoskeletal: No joint swelling or tenderness.  Neurologic: Alert, arousable, appears oriented x0, cranial nerves II through XII shows no facial asymmetry, speech is very sparse, sensory patient localize noxious stimuli, and motor function spontaneous movement of all 4 extremities, patient " cannot comply with neurologic exam.   Psychiatric: Unable to assess.    EKG  EKG Interpretation:  Interpreted by me    Rhythm:  Normal sinus rhythm  Rate: 96  Intervals: Normal  Axis: normal  Ectopy: none  Conduction: Right bundle branch block  ST Segments: no evidence of elevation or depression  T Waves: Inverted T waves in leads V1 through V3, 3, aVF  Q Waves: none  Clinical Impression: No acute injury or ischemic pattern.   Comparison to previous EKG from March 2021 shows no acute changes, the right bundle branch block is old.      LABS  Labs Reviewed   CBC WITH DIFFERENTIAL - Abnormal; Notable for the following components:       Result Value    WBC 14.2 (*)     RBC 3.53 (*)     Hemoglobin 9.7 (*)     Hematocrit 30.5 (*)     MCHC 31.8 (*)     RDW 64.4 (*)     Platelet Count 116 (*)     Neutrophils-Polys 88.80 (*)     Lymphocytes 3.40 (*)     Neutrophils (Absolute) 12.65 (*)     Lymphs (Absolute) 0.48 (*)     Monos (Absolute) 0.99 (*)     All other components within normal limits   COMP METABOLIC PANEL - Abnormal; Notable for the following components:    Sodium 125 (*)     Chloride 94 (*)     Glucose 161 (*)     Calcium 8.2 (*)     Alkaline Phosphatase 121 (*)     Albumin 2.4 (*)     Globulin 4.6 (*)     All other components within normal limits   URINALYSIS - Abnormal; Notable for the following components:    Ketones Trace (*)     Protein 300 (*)     Leukocyte Esterase Trace (*)     Occult Blood Small (*)     All other components within normal limits    Narrative:     Indication for culture:->Evaluation for sepsis without a  clear source of infection   APTT - Abnormal; Notable for the following components:    APTT 38.3 (*)     All other components within normal limits    Narrative:     Indicate which anticoagulants the patient is on:->UNKNOWN   PROTHROMBIN TIME - Abnormal; Notable for the following components:    PT 19.2 (*)     INR 1.68 (*)     All other components within normal limits    Narrative:     Indicate  "which anticoagulants the patient is on:->UNKNOWN   URINE MICROSCOPIC (W/UA) - Abnormal; Notable for the following components:    WBC 20-50 (*)     RBC 5-10 (*)     Hyaline Cast 11-20 (*)     All other components within normal limits    Narrative:     Indication for culture:->Evaluation for sepsis without a  clear source of infection   URINE CULTURE(NEW)    Narrative:     Indication for culture:->Evaluation for sepsis without a  clear source of infection   BLOOD CULTURE    Narrative:     Per Hospital Policy: Only change Specimen Src: to \"Line\" if  specified by physician order.   BLOOD CULTURE    Narrative:     Per Hospital Policy: Only change Specimen Src: to \"Line\" if  specified by physician order.   LACTIC ACID   COV-2, FLU A/B, AND RSV BY PCR   ESTIMATED GFR   PERIPHERAL SMEAR REVIEW   DIFFERENTIAL COMMENT   LACTIC ACID   LACTIC ACID   TROPONIN       All labs reviewed by me.      RADIOLOGY/PROCEDURES  CT-ABDOMEN-PELVIS WITH   Final Result      1.  Wall thickening of the rectum is noted and there is induration around the perirectal space. Findings could be due to proctitis.      2.  Small right pleural effusion is slightly larger than on prior exam.      3.  Consolidation and volume loss is again noted in each lung base.      4.  Cardiomegaly is again noted.         CT-HEAD W/O   Final Result         NO ACUTE ABNORMALITIES ARE NOTED ON CT SCAN OF THE HEAD.      Findings are consistent with atrophy.  Decreased attenuation in the periventricular white matter likely indicates microvascular ischemic disease.      DX-CHEST-PORTABLE (1 VIEW)   Final Result         Ill-defined opacifications in each lung have increased compared to the prior radiograph.  This could indicate worsening of pulmonary edema or inflammation.          The radiologist's interpretations of all radiological studies have been reviewed by me.        COURSE & MEDICAL DECISION MAKING  Pertinent Labs & Imaging studies reviewed. (See chart for " details)  The patient presents with the above complaints.  She was noted be febrile and altered.  Sepsis protocol was initiated.  She was given a liter bolus of lactated Ringer's.  She was given a dose of Rocephin.  Because of her altered mental status a CT scan of the head without contrast was obtained which shows no acute intracranial findings.  CT scan of the abdomen/pelvis with IV contrast showed no acute obstruction, the appendix was not visualized, and appear to be some wall thickening of the rectum with some induration around the perirectal space which could be due to proctitis per radiology.    CBC showed a white count 14,200, hemoglobin 9.7, platelets 116, with 89% polys, Chemistry shows a sodium 125, glucose 161, normal renal function and liver function test, lactic acid normal 1.4, INR 1.68.  Patient was continued on lactated Ringer's.  Findings were discussed with the patient's family.  She may have an underlying urinary tract infection though she had more white cells in her urine previously and with a negative culture.  She was also noted to have a right pleural effusion on CT scan.  Case was dicussed with Dr. Lake the hospitalist for admission.  Critical care time of 35 minutes.    FINAL IMPRESSION  1. Sepsis with encephalopathy without septic shock, due to unspecified organism (HCC)    2. Altered mental status, unspecified altered mental status type    3. Generalized abdominal pain    4. Cystitis          Electronically signed by: Jonathan Thacker M.D., 6/20/2021 10:06 AM

## 2021-06-21 ENCOUNTER — HOME CARE VISIT (OUTPATIENT)
Dept: HOME HEALTH SERVICES | Facility: HOME HEALTHCARE | Age: 86
End: 2021-06-21
Payer: MEDICARE

## 2021-06-21 ENCOUNTER — TELEPHONE (OUTPATIENT)
Dept: CARDIOLOGY | Facility: MEDICAL CENTER | Age: 86
End: 2021-06-21

## 2021-06-21 ENCOUNTER — PATIENT OUTREACH (OUTPATIENT)
Dept: HEALTH INFORMATION MANAGEMENT | Facility: OTHER | Age: 86
End: 2021-06-21

## 2021-06-21 PROBLEM — D64.9 NORMOCYTIC ANEMIA: Status: ACTIVE | Noted: 2021-06-21

## 2021-06-21 PROBLEM — D69.6 THROMBOCYTOPENIA (HCC): Status: ACTIVE | Noted: 2021-06-21

## 2021-06-21 PROBLEM — E87.6 HYPOKALEMIA: Status: ACTIVE | Noted: 2021-06-21

## 2021-06-21 PROBLEM — J96.01 ACUTE RESPIRATORY FAILURE WITH HYPOXIA (HCC): Status: ACTIVE | Noted: 2021-06-21

## 2021-06-21 LAB
ALBUMIN SERPL BCP-MCNC: 2.5 G/DL (ref 3.2–4.9)
ALBUMIN/GLOB SERPL: 0.5 G/DL
ALP SERPL-CCNC: 121 U/L (ref 30–99)
ALT SERPL-CCNC: 16 U/L (ref 2–50)
ANION GAP SERPL CALC-SCNC: 9 MMOL/L (ref 7–16)
AST SERPL-CCNC: 25 U/L (ref 12–45)
BILIRUB SERPL-MCNC: 0.9 MG/DL (ref 0.1–1.5)
BUN SERPL-MCNC: 18 MG/DL (ref 8–22)
CALCIUM SERPL-MCNC: 7.5 MG/DL (ref 8.5–10.5)
CHLORIDE SERPL-SCNC: 100 MMOL/L (ref 96–112)
CO2 SERPL-SCNC: 21 MMOL/L (ref 20–33)
CREAT SERPL-MCNC: 0.64 MG/DL (ref 0.5–1.4)
ERYTHROCYTE [DISTWIDTH] IN BLOOD BY AUTOMATED COUNT: 66 FL (ref 35.9–50)
GLOBULIN SER CALC-MCNC: 4.6 G/DL (ref 1.9–3.5)
GLUCOSE BLD-MCNC: 103 MG/DL (ref 65–99)
GLUCOSE BLD-MCNC: 144 MG/DL (ref 65–99)
GLUCOSE BLD-MCNC: 182 MG/DL (ref 65–99)
GLUCOSE SERPL-MCNC: 122 MG/DL (ref 65–99)
HCT VFR BLD AUTO: 29.6 % (ref 37–47)
HGB BLD-MCNC: 9.1 G/DL (ref 12–16)
LACTATE BLD-SCNC: 1.5 MMOL/L (ref 0.5–2)
MCH RBC QN AUTO: 27 PG (ref 27–33)
MCHC RBC AUTO-ENTMCNC: 30.7 G/DL (ref 33.6–35)
MCV RBC AUTO: 87.8 FL (ref 81.4–97.8)
NT-PROBNP SERPL IA-MCNC: ABNORMAL PG/ML (ref 0–125)
PLATELET # BLD AUTO: 160 K/UL (ref 164–446)
PMV BLD AUTO: 11.4 FL (ref 9–12.9)
POTASSIUM SERPL-SCNC: 3.5 MMOL/L (ref 3.6–5.5)
PROCALCITONIN SERPL-MCNC: 0.61 NG/ML
PROT SERPL-MCNC: 7.1 G/DL (ref 6–8.2)
RBC # BLD AUTO: 3.37 M/UL (ref 4.2–5.4)
SODIUM SERPL-SCNC: 130 MMOL/L (ref 135–145)
TROPONIN T SERPL-MCNC: 66 NG/L (ref 6–19)
WBC # BLD AUTO: 17.7 K/UL (ref 4.8–10.8)

## 2021-06-21 PROCEDURE — 85027 COMPLETE CBC AUTOMATED: CPT

## 2021-06-21 PROCEDURE — 700102 HCHG RX REV CODE 250 W/ 637 OVERRIDE(OP): Performed by: HOSPITALIST

## 2021-06-21 PROCEDURE — 84145 PROCALCITONIN (PCT): CPT

## 2021-06-21 PROCEDURE — 84484 ASSAY OF TROPONIN QUANT: CPT

## 2021-06-21 PROCEDURE — A9270 NON-COVERED ITEM OR SERVICE: HCPCS | Performed by: NURSE PRACTITIONER

## 2021-06-21 PROCEDURE — 700105 HCHG RX REV CODE 258: Performed by: HOSPITALIST

## 2021-06-21 PROCEDURE — 700102 HCHG RX REV CODE 250 W/ 637 OVERRIDE(OP): Performed by: NURSE PRACTITIONER

## 2021-06-21 PROCEDURE — 83605 ASSAY OF LACTIC ACID: CPT

## 2021-06-21 PROCEDURE — 700102 HCHG RX REV CODE 250 W/ 637 OVERRIDE(OP): Performed by: INTERNAL MEDICINE

## 2021-06-21 PROCEDURE — 83880 ASSAY OF NATRIURETIC PEPTIDE: CPT

## 2021-06-21 PROCEDURE — A9270 NON-COVERED ITEM OR SERVICE: HCPCS | Performed by: INTERNAL MEDICINE

## 2021-06-21 PROCEDURE — 700102 HCHG RX REV CODE 250 W/ 637 OVERRIDE(OP): Performed by: STUDENT IN AN ORGANIZED HEALTH CARE EDUCATION/TRAINING PROGRAM

## 2021-06-21 PROCEDURE — A9270 NON-COVERED ITEM OR SERVICE: HCPCS | Performed by: STUDENT IN AN ORGANIZED HEALTH CARE EDUCATION/TRAINING PROGRAM

## 2021-06-21 PROCEDURE — 99233 SBSQ HOSP IP/OBS HIGH 50: CPT | Mod: 25 | Performed by: INTERNAL MEDICINE

## 2021-06-21 PROCEDURE — 770006 HCHG ROOM/CARE - MED/SURG/GYN SEMI*

## 2021-06-21 PROCEDURE — 700101 HCHG RX REV CODE 250: Performed by: INTERNAL MEDICINE

## 2021-06-21 PROCEDURE — 700111 HCHG RX REV CODE 636 W/ 250 OVERRIDE (IP): Performed by: HOSPITALIST

## 2021-06-21 PROCEDURE — 700111 HCHG RX REV CODE 636 W/ 250 OVERRIDE (IP): Performed by: INTERNAL MEDICINE

## 2021-06-21 PROCEDURE — 87045 FECES CULTURE AEROBIC BACT: CPT

## 2021-06-21 PROCEDURE — A9270 NON-COVERED ITEM OR SERVICE: HCPCS | Performed by: HOSPITALIST

## 2021-06-21 PROCEDURE — 83993 ASSAY FOR CALPROTECTIN FECAL: CPT

## 2021-06-21 PROCEDURE — 99497 ADVNCD CARE PLAN 30 MIN: CPT | Performed by: INTERNAL MEDICINE

## 2021-06-21 PROCEDURE — 87899 AGENT NOS ASSAY W/OPTIC: CPT

## 2021-06-21 PROCEDURE — 82962 GLUCOSE BLOOD TEST: CPT | Mod: 91

## 2021-06-21 PROCEDURE — 80053 COMPREHEN METABOLIC PANEL: CPT

## 2021-06-21 PROCEDURE — 36415 COLL VENOUS BLD VENIPUNCTURE: CPT

## 2021-06-21 RX ORDER — POTASSIUM CHLORIDE 20 MEQ/1
20 TABLET, EXTENDED RELEASE ORAL DAILY
Status: DISCONTINUED | OUTPATIENT
Start: 2021-06-21 | End: 2021-06-23

## 2021-06-21 RX ORDER — ENEMA 19; 7 G/133ML; G/133ML
1 ENEMA RECTAL ONCE
Status: COMPLETED | OUTPATIENT
Start: 2021-06-21 | End: 2021-06-22

## 2021-06-21 RX ORDER — ENEMA 19; 7 G/133ML; G/133ML
1 ENEMA RECTAL ONCE
Status: ACTIVE | OUTPATIENT
Start: 2021-06-22 | End: 2021-06-23

## 2021-06-21 RX ORDER — FUROSEMIDE 10 MG/ML
20 INJECTION INTRAMUSCULAR; INTRAVENOUS
Status: DISCONTINUED | OUTPATIENT
Start: 2021-06-21 | End: 2021-06-24

## 2021-06-21 RX ORDER — SODIUM CHLORIDE AND POTASSIUM CHLORIDE 150; 900 MG/100ML; MG/100ML
INJECTION, SOLUTION INTRAVENOUS CONTINUOUS
Status: DISCONTINUED | OUTPATIENT
Start: 2021-06-21 | End: 2021-06-21

## 2021-06-21 RX ADMIN — SODIUM CHLORIDE 3 G: 900 INJECTION INTRAVENOUS at 14:04

## 2021-06-21 RX ADMIN — POTASSIUM CHLORIDE AND SODIUM CHLORIDE: 900; 150 INJECTION, SOLUTION INTRAVENOUS at 09:34

## 2021-06-21 RX ADMIN — SODIUM CHLORIDE 3 G: 900 INJECTION INTRAVENOUS at 08:06

## 2021-06-21 RX ADMIN — INSULIN LISPRO 1 UNITS: 100 INJECTION, SOLUTION INTRAVENOUS; SUBCUTANEOUS at 23:06

## 2021-06-21 RX ADMIN — SODIUM CHLORIDE: 9 INJECTION, SOLUTION INTRAVENOUS at 02:10

## 2021-06-21 RX ADMIN — APIXABAN 2.5 MG: 5 TABLET, FILM COATED ORAL at 18:33

## 2021-06-21 RX ADMIN — MAGNESIUM CITRATE 296 ML: 1.75 LIQUID ORAL at 23:13

## 2021-06-21 RX ADMIN — SODIUM CHLORIDE 3 G: 900 INJECTION INTRAVENOUS at 00:54

## 2021-06-21 RX ADMIN — INSULIN LISPRO 1 UNITS: 100 INJECTION, SOLUTION INTRAVENOUS; SUBCUTANEOUS at 18:26

## 2021-06-21 RX ADMIN — OXYCODONE 2.5 MG: 5 TABLET ORAL at 06:34

## 2021-06-21 RX ADMIN — SODIUM CHLORIDE 3 G: 900 INJECTION INTRAVENOUS at 23:05

## 2021-06-21 RX ADMIN — POTASSIUM CHLORIDE 20 MEQ: 1500 TABLET, EXTENDED RELEASE ORAL at 18:28

## 2021-06-21 RX ADMIN — ATORVASTATIN CALCIUM 20 MG: 20 TABLET, FILM COATED ORAL at 23:05

## 2021-06-21 RX ADMIN — SODIUM CHLORIDE: 9 INJECTION, SOLUTION INTRAVENOUS at 00:56

## 2021-06-21 RX ADMIN — APIXABAN 2.5 MG: 5 TABLET, FILM COATED ORAL at 05:58

## 2021-06-21 RX ADMIN — FUROSEMIDE 20 MG: 10 INJECTION, SOLUTION INTRAMUSCULAR; INTRAVENOUS at 18:28

## 2021-06-21 SDOH — ECONOMIC STABILITY: FOOD INSECURITY: WITHIN THE PAST 12 MONTHS, THE FOOD YOU BOUGHT JUST DIDN'T LAST AND YOU DIDN'T HAVE MONEY TO GET MORE.: NEVER TRUE

## 2021-06-21 SDOH — ECONOMIC STABILITY: FOOD INSECURITY: WITHIN THE PAST 12 MONTHS, YOU WORRIED THAT YOUR FOOD WOULD RUN OUT BEFORE YOU GOT MONEY TO BUY MORE.: NEVER TRUE

## 2021-06-21 SDOH — ECONOMIC STABILITY: TRANSPORTATION INSECURITY
IN THE PAST 12 MONTHS, HAS THE LACK OF TRANSPORTATION KEPT YOU FROM MEDICAL APPOINTMENTS OR FROM GETTING MEDICATIONS?: NO

## 2021-06-21 SDOH — ECONOMIC STABILITY: TRANSPORTATION INSECURITY
IN THE PAST 12 MONTHS, HAS LACK OF TRANSPORTATION KEPT YOU FROM MEETINGS, WORK, OR FROM GETTING THINGS NEEDED FOR DAILY LIVING?: NO

## 2021-06-21 ASSESSMENT — ENCOUNTER SYMPTOMS
TINGLING: 0
HEADACHES: 0
BLOOD IN STOOL: 0
SINUS PAIN: 0
SHORTNESS OF BREATH: 1
CHILLS: 0
DIZZINESS: 0
SPEECH CHANGE: 1
COUGH: 0
ABDOMINAL PAIN: 1
SORE THROAT: 0
DIARRHEA: 0
HEARTBURN: 0
WEAKNESS: 1
NAUSEA: 0
MEMORY LOSS: 1
VOMITING: 0
CONSTIPATION: 1
PALPITATIONS: 0

## 2021-06-21 ASSESSMENT — LIFESTYLE VARIABLES: SUBSTANCE_ABUSE: 0

## 2021-06-21 ASSESSMENT — SOCIAL DETERMINANTS OF HEALTH (SDOH): HOW HARD IS IT FOR YOU TO PAY FOR THE VERY BASICS LIKE FOOD, HOUSING, MEDICAL CARE, AND HEATING?: NOT HARD AT ALL

## 2021-06-21 ASSESSMENT — PAIN DESCRIPTION - PAIN TYPE: TYPE: ACUTE PAIN

## 2021-06-21 NOTE — CARE PLAN
Problem: Skin Integrity  Goal: Skin integrity is maintained or improved  Outcome: Progressing     Problem: Fall Risk  Goal: Patient will remain free from falls  Outcome: Progressing     The patient is Watcher - Medium risk of patient condition declining or worsening    Shift Goals  Clinical Goals: safety, no falls

## 2021-06-21 NOTE — PROGRESS NOTES
Bedside report received. Assessment completed.  Pt is A&O x 2. Pt on 2L, this is baseline.   Medicating for pain PRN per MAR Pain 0/10  Denies nausea.   - numbness, - tingling.  Skin fragile, bruising to arms.   LDA CDI   Last BM 6/21/21, +flatus, +void.  Diabetic, 2gm Sodium diet, 1:1 feed.   Pt bed rest, Q2 turns.  Call light and belongings within reach. All needs met at this time. Fall Precautions and hourly rounding in place.

## 2021-06-21 NOTE — HOSPITAL COURSE
89-year-old female with a history of CAD, chronic systolic heart failure, dementia, diabetes, hypertension admitted with worsening confusion and abdominal distention.  At baseline patient can walk around, eat by herself, conversive, normal long-term memory but has trouble with short-term memory, lives with family.  Last week patient noted to have difficulty urinating so home health nurse placed Gomes which caused significant pain and irritation so patient was taken to the ED on 6/15 and urinalysis concerning for possible UTI so was started on oral antibiotics however urine culture was negative.  Patient continued to have worsening confusion and abdominal distention.  Brought into the ED again on 6/20, she was febrile, tachypneic.  CT scan found wall thickening of the rectum concerning for proctitis as well as small right pleural effusion, consolidation and volume loss and each lung and cardiomegaly.  CT head negative for acute findings.  She was started on IV antibiotics.  GI consulted.

## 2021-06-21 NOTE — CONSULTS
Gastroenterology Initial Consult Note               Author:  EMELYN Baumann Date & Time Created: 6/21/2021 3:29 PM       Patient ID:  Name:             Yoon Richards    YOB: 1928  Age:                 89 y.o.  female  MRN:               [id number]      Referring Provider:  Aicha Vizcaino MD      Presenting Chief Complaint:  Abdominal pain, Abnormal CT of the pelvis      History of Present Illness:    She is a 89-year-old female patient seen in consultation for abdominal pain and abnormal CT of the pelvis.  She was admitted yesterday after family noted that she had worsening confusion with altered level of consciousness, leg edema, and distended abdomen recently over the last few days.  Her family reported decrease in oral intake, increasing lethargy, sleeping most of the day, and not meeting her nutritional and hydration needs.  Upon evaluation she was found to have WBC 14.2, hemoglobin 9.7, hematocrit 30.5, MCV 86.4, left shift.  CMP significant for sodium 130, glucose 122, calcium 7.5, alkaline phosphatase 121, albumin 2.5, globulin 4.6, troponin stable at 61 and 66.  Urinalysis with dark yellow urine, small occult blood, 300 protein, trace leukocyte esterase, negative bacteria.  Covid negative.  CT scan of the abdomen and pelvis demonstrates wall thickening of the rectum with induration around the perirectal space, small right pleural effusion, consolidation and volume loss again noted in each lung base, and cardiomegaly.    The patient does have a history of coronary artery disease with previous CABG, chronic systolic and diastolic heart failure, dementia, diabetes, hyperlipidemia, hypertension.  Looking back at her records, and echo report brought from [facility] that was performed in February, was noted by the medical team in March at this facility to be reflective of ejection fraction 30 to 35%, moderate left ventricular hypertrophy, grade 3 diastolic  dysfunction, areas of hypokinesis.  She does have atrial fibrillation history and is on Eliquis.    The patient's last colonoscopy and endoscopy was in 2010 per the patient's daughter.  Findings included possible gastritis and 2 polyps that were removed at that time.  Pathology is unknown.      Review of Systems:  Review of Systems   Constitutional: Positive for malaise/fatigue. Negative for chills.   HENT: Positive for hearing loss. Negative for sinus pain and sore throat.    Respiratory: Positive for shortness of breath. Negative for cough.    Cardiovascular: Positive for leg swelling. Negative for chest pain and palpitations.   Gastrointestinal: Positive for abdominal pain and constipation. Negative for blood in stool, diarrhea, heartburn, melena, nausea and vomiting.   Genitourinary: Positive for dysuria.        Catheter in place   Neurological: Positive for speech change and weakness. Negative for dizziness, tingling and headaches.   Psychiatric/Behavioral: Positive for memory loss. Negative for substance abuse.             Past Medical History:  Past Medical History:   Diagnosis Date   • CAD (coronary artery disease)    • Chronic systolic heart failure (HCC)    • Dementia ([facility])    • Diabetes ([facility])    • High cholesterol    • Hypertension      Active Hospital Problems    Diagnosis    • Sepsis ([facility]) [A41.9]    • Chronic combined systolic and diastolic congestive heart failure (HCC) [I50.42]    • Urinary retention [R33.9]    • Gait instability [R26.81]    • Hyponatremia [E87.1]    • Dementia (HCC) [F03.90]    • Paroxysmal A-fib (HCC) [I48.0]    • DM (diabetes mellitus) ([facility]) [E11.9]    • Hypertension [I10]          Past Surgical History:  Past Surgical History:   Procedure Laterality Date   • CHOLECYSTECTOMY     • CORONARY ARTERY BYPAS, [address] [place] [address] Medications:  Current Facility-Administered Medications   Medication Dose Frequency Provider Last Rate Last Admin   • 0.9 % NaCl with KCl 20 mEq infusion    Continuous Aicha Vizcaino M.D. 50 mL/hr at 06/21/21 1406 Rate Change at 06/21/21 1406   • ampicillin/sulbactam (UNASYN) 3 g in  mL IVPB  3 g Q6HR Maurice Fernandez M.D.   Stopped at 06/21/21 1450   • apixaban (ELIQUIS) tablet 2.5 mg  2.5 mg BID Maurice Fernandez M.D.   2.5 mg at 06/21/21 0558   • atorvastatin (LIPITOR) tablet 20 mg  20 mg Nightly Maurice Fernandez M.D.   20 mg at 06/20/21 2146   • carvedilol (COREG) tablet 3.125 mg  3.125 mg BID WITH MEALS Maurice Fernandez M.D.   3.125 mg at 06/20/21 1807   • losartan (COZAAR) tablet 25 mg  25 mg DAILY Maurice Fernandez M.D.   25 mg at 06/20/21 1552   • polyethylene glycol/lytes (MIRALAX) PACKET 1 Packet  1 Packet BID PRN Maurice Fernandez M.D.       • insulin glargine (Semglee) injection  10 Units Q EVENING Maurice Fernandez M.D.   10 Units at 06/20/21 1811    And   • insulin lispro (AdmeLOG) injection  0.12 Units/kg/day TID AC Maurice Fernandez M.D.   3 Units at 06/20/21 1811    And   • insulin lispro (AdmeLOG) injection  1-6 Units 4X/DAY ACHS Maurice Fernandez M.D.        And   • glucose 4 g chewable tablet 16 g  16 g Q15 MIN PRN Maurice Fernandez M.D.        And   • dextrose 50% (D50W) injection 50 mL  50 mL Q15 MIN PRN Maurice Fernandez M.D.       • Pharmacy Consult Request ...Pain Management Review 1 Each  1 Each PHARMACY TO DOSE Ashok Simms M.D.       • oxyCODONE immediate-release (ROXICODONE) tablet 2.5 mg  2.5 mg Q3HRS PRN Ashok Simms M.D.   2.5 mg at 06/21/21 0634    Or   • oxyCODONE immediate-release (ROXICODONE) tablet 5 mg  5 mg Q3HRS PRN Ashok Simms M.D.   5 mg at 06/20/21 1643    Or   • morphine (pf) 4 mg/mL injection 2 mg  2 mg Q3HRS PRN Ashok Simms M.D.       Last reviewed on 6/20/2021 11:57 AM by Christine Echeverria        Current Outpatient Medications:  Medications Prior to Admission   Medication Sig Dispense Refill Last Dose   • loratadine (CLARITIN) 10 MG Tab Take 10 mg by mouth 1 time a day as needed (for itching).   6/19/2021 at Unknown time   •  cefdinir (OMNICEF) 300 MG Cap Take 300 mg by mouth 2 times a day. For 7 days   2021 at 2130   • [] HYDROcodone-acetaminophen (NORCO) 5-325 MG Tab per tablet Take 0.5-1 Tablets by mouth every 6 hours as needed for up to 4 days. 12 tablet 0 unknown at Unknown time   • oxybutynin (DITROPAN) 5 MG Tab Take 1 tablet by mouth 2 times a day. 90 tablet 0 STOPPED at 6/15/21   • linagliptin (TRADJENTA) 5 MG Tab tablet Take 1 tablet by mouth every day. Indications: Type 2 Diabetes 30 tablet 5 2021 at am   • atorvastatin (LIPITOR) 20 MG Tab Take 20 mg by mouth every evening.   2021 at 2130   • Calcium Carb-Cholecalciferol (OYSTER SHELL CALCIUM/VITAMIN D) 250-125 MG-UNIT Tab tablet Take 1 tablet by mouth every day.   2021 at 1200   • furosemide (LASIX) 20 MG Tab Take 1 tablet by mouth every Mon, Wed, Fri, Sat, and Sun at 6 PM. Saturday &  as needed (Patient taking differently: Take 20 mg by mouth every Monday, Wednesday, and Friday.) 90 tablet 3 2021 at Unknown time   • polyethylene glycol/lytes (MIRALAX) 17 g Pack Take 17 g by mouth 2 times a day as needed (constipation). Indications: Constipation   2021 at Unknown time   • vitamin D (CHOLECALCIFEROL) 1000 Unit (25 mcg) Tab Take 1,000 Units by mouth every day.   2021 at 1200   • carvedilol (COREG) 3.125 MG Tab Take 1 tablet by mouth 2 times a day with meals. 60 tablet 3 2021 at 2130   • losartan (COZAAR) 25 MG Tab Take 1 tablet by mouth every day. 30 tablet 11 2021 at am   • apixaban (ELIQUIS) 2.5mg Tab Take 1 tablet by mouth 2 times a day. 60 tablet 11 2021 at 2130   • acetaminophen (TYLENOL) 650 MG CR tablet Take 650 mg by mouth every 6 hours as needed for Moderate Pain. Indications: Fever, Pain   unknown at Unknown time   • Home Care Oxygen Inhale 2 L/min continuous. Oxygen dose range: 1 L/min  Respiratory route via: Nasal Cannula   Oxygen supplier: [facility]       unknown at Unknown time   • thiamine (VITAMIN  B-1) 100 MG Tab Take 100 mg by mouth every day.   6/19/2021 at 1200   • Iron-Vit C-Vit B12-Folic Acid (IRON 100 PLUS PO) Take 1 tablet by mouth every day.   6/19/2021 at 1200         Medication Allergies:  Allergies   Allergen Reactions   • Ciprofloxacin Itching   • Donepezil Vomiting   • Hydrochlorothiazide Itching   • Sulfa Drugs Itching   • Trimethoprim Itching         Family Medical History:  No family history on file.      Social History:  Social History     Socioeconomic History   • Marital status:      Spouse name: Not on file   • Number of children: Not on file   • Years of education: Not on file   • Highest education level: Not on file   Occupational History   • Not on file   Tobacco Use   • Smoking status: Former Smoker   • Smokeless tobacco: Never Used   Vaping Use   • Vaping Use: Never used   Substance and Sexual Activity   • Alcohol use: Never   • Drug use: Never   • Sexual activity: Not Currently   Other Topics Concern   • Not on file   Social History Narrative   • Not on file     Social Determinants of Health     Financial Resource Strain:    • Difficulty of Paying Living Expenses:    Food Insecurity:    • Worried About Running Out of Food in the Last Year:    • Ran Out of Food in the Last Year:    Transportation Needs:    • Lack of Transportation (Medical):    • Lack of Transportation (Non-Medical):    Physical Activity:    • Days of Exercise per Week:    • Minutes of Exercise per Session:    Stress:    • Feeling of Stress :    Social Connections:    • Frequency of Communication with Friends and Family:    • Frequency of Social Gatherings with Friends and Family:    • Attends Muslim Services:    • Active Member of Clubs or Organizations:    • Attends Club or Organization Meetings:    • Marital Status:    Intimate Partner Violence:    • Fear of Current or Ex-Partner:    • Emotionally Abused:    • Physically Abused:    • Sexually Abused:          Vital signs:  Weight/BMI: Body mass index is  "26.83 kg/m².  /54   Pulse 84   Temp 35.9 °C (96.7 °F) (Temporal)   Resp 18   Ht 1.6 m (5' 3\")   Wt 68.7 kg (151 lb 7.3 oz)   SpO2 98%   Vitals:    06/20/21 2013 06/20/21 2323 06/21/21 0405 06/21/21 0755   BP: 102/48 100/58 120/57 106/54   Pulse: 93 90 92 84   Resp: 19 19 17 18   Temp: 36.4 °C (97.5 °F) 36.3 °C (97.3 °F) 36.1 °C (96.9 °F) 35.9 °C (96.7 °F)   TempSrc: Temporal Temporal Temporal Temporal   SpO2: 97% 95% 95% 98%   Weight:       Height:         Oxygen Therapy:  Pulse Oximetry: 98 %, O2 (LPM): 2, O2 Delivery Device: Nasal Cannula    Intake/Output Summary (Last 24 hours) at 6/21/2021 1529  Last data filed at 6/21/2021 0530  Gross per 24 hour   Intake 1138.33 ml   Output 725 ml   Net 413.33 ml         Physical Exam:  Physical Exam  Vitals and nursing note reviewed.   Constitutional:       Appearance: Normal appearance.   HENT:      Head: Normocephalic and atraumatic.      Right Ear: External ear normal.      Left Ear: External ear normal.      Nose: Nose normal.      Mouth/Throat:      Mouth: Mucous membranes are dry.      Pharynx: Oropharynx is clear.   Eyes:      General: No scleral icterus.     Pupils: Pupils are equal, round, and reactive to light.   Cardiovascular:      Rate and Rhythm: Tachycardia present. Rhythm irregular.      Pulses: Normal pulses.      Heart sounds: Normal heart sounds.   Pulmonary:      Effort: Pulmonary effort is normal.      Breath sounds: Rales (LLL) present.   Abdominal:      General: Bowel sounds are normal. There is distension.      Palpations: Abdomen is soft.      Hernia: A hernia (Umbilical, reducible without pain) is present.   Musculoskeletal:      Cervical back: Neck supple.      Right lower leg: No edema.      Left lower leg: No edema.   Skin:     General: Skin is warm and dry.      Coloration: Skin is pale.   Neurological:      Mental Status: She is alert. Mental status is at baseline. She is disoriented.   Psychiatric:         Mood and Affect: Mood " normal.         Behavior: Behavior normal.             Labs:  Recent Labs     06/20/21  0956 06/21/21  0252   SODIUM 125* 130*   POTASSIUM 3.8 3.5*   CHLORIDE 94* 100   CO2 22 21   BUN 16 18   CREATININE 0.63 0.64   CALCIUM 8.2* 7.5*     Recent Labs     06/20/21  0956 06/21/21  0252   ALTSGPT 9 16   ASTSGOT 15 25   ALKPHOSPHAT 121* 121*   TBILIRUBIN 1.0 0.9   GLUCOSE 161* 122*     Recent Labs     06/20/21  0956 06/21/21  0252   WBC 14.2* 17.7*   NEUTSPOLYS 88.80*  --    LYMPHOCYTES 3.40*  --    MONOCYTES 7.00  --    EOSINOPHILS 0.00  --    BASOPHILS 0.20  --    ASTSGOT 15 25   ALTSGPT 9 16   ALKPHOSPHAT 121* 121*   TBILIRUBIN 1.0 0.9     Recent Labs     06/20/21  0956 06/20/21  1045 06/21/21  0252   RBC 3.53*  --  3.37*   HEMOGLOBIN 9.7*  --  9.1*   HEMATOCRIT 30.5*  --  29.6*   PLATELETCT 116*  --  160*   PROTHROMBTM  --  19.2*  --    APTT  --  38.3*  --    INR  --  1.68*  --      Recent Results (from the past 24 hour(s))   TROPONIN    Collection Time: 06/20/21  5:33 PM   Result Value Ref Range    Troponin T 61 (H) 6 - 19 ng/L   Lactic Acid Every four hours after STAT order    Collection Time: 06/20/21  5:33 PM   Result Value Ref Range    Lactic Acid 1.4 0.5 - 2.0 mmol/L   POCT glucose device results    Collection Time: 06/20/21  6:10 PM   Result Value Ref Range    Glucose - Accu-Ck 141 (H) 65 - 99 mg/dL   Lactic Acid Every four hours after STAT order    Collection Time: 06/20/21  9:11 PM   Result Value Ref Range    Lactic Acid 1.3 0.5 - 2.0 mmol/L   POCT glucose device results    Collection Time: 06/20/21  9:42 PM   Result Value Ref Range    Glucose - Accu-Ck 123 (H) 65 - 99 mg/dL   Lactic Acid Every four hours after STAT order    Collection Time: 06/21/21  2:52 AM   Result Value Ref Range    Lactic Acid 1.5 0.5 - 2.0 mmol/L   Comp Metabolic Panel    Collection Time: 06/21/21  2:52 AM   Result Value Ref Range    Sodium 130 (L) 135 - 145 mmol/L    Potassium 3.5 (L) 3.6 - 5.5 mmol/L    Chloride 100 96 - 112 mmol/L     Co2 21 20 - 33 mmol/L    Anion Gap 9.0 7.0 - 16.0    Glucose 122 (H) 65 - 99 mg/dL    Bun 18 8 - 22 mg/dL    Creatinine 0.64 0.50 - 1.40 mg/dL    Calcium 7.5 (L) 8.5 - 10.5 mg/dL    AST(SGOT) 25 12 - 45 U/L    ALT(SGPT) 16 2 - 50 U/L    Alkaline Phosphatase 121 (H) 30 - 99 U/L    Total Bilirubin 0.9 0.1 - 1.5 mg/dL    Albumin 2.5 (L) 3.2 - 4.9 g/dL    Total Protein 7.1 6.0 - 8.2 g/dL    Globulin 4.6 (H) 1.9 - 3.5 g/dL    A-G Ratio 0.5 g/dL   ESTIMATED GFR    Collection Time: 06/21/21  2:52 AM   Result Value Ref Range    GFR If African American >60 >60 mL/min/1.73 m 2    GFR If Non African American >60 >60 mL/min/1.73 m 2   CBC WITHOUT DIFFERENTIAL    Collection Time: 06/21/21  2:52 AM   Result Value Ref Range    WBC 17.7 (H) 4.8 - 10.8 K/uL    RBC 3.37 (L) 4.20 - 5.40 M/uL    Hemoglobin 9.1 (L) 12.0 - 16.0 g/dL    Hematocrit 29.6 (L) 37.0 - 47.0 %    MCV 87.8 81.4 - 97.8 fL    MCH 27.0 27.0 - 33.0 pg    MCHC 30.7 (L) 33.6 - 35.0 g/dL    RDW 66.0 (H) 35.9 - 50.0 fL    Platelet Count 160 (L) 164 - 446 K/uL    MPV 11.4 9.0 - 12.9 fL   POCT glucose device results    Collection Time: 06/21/21  8:02 AM   Result Value Ref Range    Glucose - Accu-Ck 103 (H) 65 - 99 mg/dL   TROPONIN    Collection Time: 06/21/21  9:11 AM   Result Value Ref Range    Troponin T 66 (H) 6 - 19 ng/L   POCT glucose device results    Collection Time: 06/21/21 12:39 PM   Result Value Ref Range    Glucose - Accu-Ck 144 (H) 65 - 99 mg/dL         Radiology Review:  CT-ABDOMEN-PELVIS WITH   Final Result      1.  Wall thickening of the rectum is noted and there is induration around the perirectal space. Findings could be due to proctitis.      2.  Small right pleural effusion is slightly larger than on prior exam.      3.  Consolidation and volume loss is again noted in each lung base.      4.  Cardiomegaly is again noted.         CT-HEAD W/O   Final Result         NO ACUTE ABNORMALITIES ARE NOTED ON CT SCAN OF THE HEAD.      Findings are consistent  with atrophy.  Decreased attenuation in the periventricular white matter likely indicates microvascular ischemic disease.      DX-CHEST-PORTABLE (1 VIEW)   Final Result         Ill-defined opacifications in each lung have increased compared to the prior radiograph.  This could indicate worsening of pulmonary edema or inflammation.      EC-ECHOCARDIOGRAM COMPLETE W/O CONT    (Results Pending)         MDM (Data Review):   -Records reviewed and summarized in current documentation  -I personally reviewed and interpreted the laboratory results  -I personally reviewed the radiology images      Medical Decision Making, by Problem:  Active Hospital Problems    Diagnosis    • Sepsis (HCC) [A41.9]    • Chronic combined systolic and diastolic congestive heart failure (HCC) [I50.42]    • Urinary retention [R33.9]    • Gait instability [R26.81]    • Hyponatremia [E87.1]    • Dementia (HCC) [F03.90]    • Paroxysmal A-fib (HCC) [I48.0]    • DM (diabetes mellitus) (HCC) [E11.9]    • Hypertension [I10]            Assessment/Recommendations:  Assessment:  1.  Abnormal CT of the rectum with thickening, possible proctitis.  Patient does not have any diarrhea or tenesmus to speak of.  Differential does includes proctitis versus malignancy.    2.  Sepsis-etiology is unclear.  While proctitis is possible, not a common source of malignancy.  Consider respiratory given consolidation and pleural effusion within the lungs.    3.  Chronic systolic and diastolic congestive heart failure ejection fraction 30 to 35% on last echocardiogram reportedly from February of this year.    4.  Dementia    5.  Paroxysmal atrial fibrillation with chronic anticoagulation on Eliquis    6.  Urinary retention with catheter    7.  Coronary artery disease    8.  Diabetes mellitus type 2    9.  Hypertension    Plan:  1.  Stool study for culture, calprotectin, ova and parasites  2.  Flexible sigmoidoscopy with biopsies tomorrow at 3 PM with Dr. Jesus Manuel Bond. Patient  seen and examined before proceeding.    Risks, benefits, and alternatives of aforementioned procedures were discussed with patient and the patient's daughters.  Consenting person(s) were given opportunities to ask questions and discuss other options.  Risks including but not limited to perforation, infection, bleeding, missed lesion(s), possible need for surgery(ies) and/or interventional radiology, possible need for repeat procedure(s) and/or additional testing, hospitalization possibly prolonged, cardiac and/or pulmonary event, aspiration, hypoxia, stroke, medication and/or anesthesia reaction, indefinite diagnosis, discomfort, unsuccessful and/or incomplete procedure, ineffective therapy and/or persistent symptoms, damage to adjacent organs and/or vascular structures, and other adverse events possibly life-threatening.  Interactive discussion was undertaken with Layman's terms. I answered questions in full and to satisfaction.  Consenting person(s) stated understanding and acceptance of these risks, and wished to proceed.  Informed consent was given in clear state of mind.    3.  Consider chest x-ray or chest CT for possible source of infection    4.  Continue Eliquis, as biopsies are low risk for bleeding and she is at high risk for cardio or respiratory thrombolic event off of blood thinner for flexible sigmoidoscopy.    5.  Clear liquid diet tonight, n.p.o. after midnight    6.  Magnesium citrate x1 tonight with fleets enema x1 tonight, then repeat fleets enema x1 at 2 PM tomorrow 6/22/2021    7.  A.m. GGT, alkaline phosphatase enzymes to evaluate elevated alkaline phosphatase    Thank you very much for allowing me to participate in the care of your patient.  Please feel free to contact me anytime at [phone].     ADELE Baumann.    Core Quality Measures   Reviewed items::  Labs, Medications and Radiology reports reviewed

## 2021-06-21 NOTE — PROGRESS NOTES
Report received. Assessment complete.  AAOx2, AMS. Patient does not call for assistance.    Pt states pain to BLE 2/2 arthritis. PRN pain meds given previous shift with good results. No further interventions needed at this time. Will continue to monitor.     Pt denies N/V, SOB, or chest pain.    MOJICA, ambulatory x 2 PA.    + void, LBM 6/20.    Gomes in place, draining sasha urine.    Pt is tolerating diabetic, low Na diet; very poor appetite. Requires 1:1 feed.       Plan of care discussed with patient. Fall precautions in place. Belongings and call light within reach. Educated patient to call for assistance as needed. All needs met at this time.

## 2021-06-21 NOTE — CARE PLAN
The patient is Watcher - Medium risk of patient condition declining or worsening    Shift Goals  Clinical Goals: safety, no falls    Progress made toward(s) clinical / shift goals:  no falls    Patient is not progressing towards the following goals:

## 2021-06-21 NOTE — PROGRESS NOTES
[facility] Medicine Daily Progress Note    Date of Service  6/21/2021    Chief Complaint  89 y.o. female admitted 6/20/2021 with confusion and abdominal distention    Hospital Course  89-year-old female with a history of CAD, chronic systolic heart failure, dementia, diabetes, hypertension admitted with worsening confusion and abdominal distention.  At baseline patient can walk around, eat by herself, conversive, normal long-term memory but has trouble with short-term memory, lives with family.  Last week patient noted to have difficulty urinating so home health nurse placed Echeverria which caused significant pain and irritation so patient was taken to the ED on 6/15 and urinalysis concerning for possible UTI so was started on oral antibiotics however urine culture was negative.  Patient continued to have worsening confusion and abdominal distention.  Brought into the ED again on 6/20, she was febrile, tachypneic.  CT scan found wall thickening of the rectum concerning for proctitis as well as small right pleural effusion, consolidation and volume loss and each lung and cardiomegaly.  CT head negative for acute findings.  She was started on IV antibiotics.  GI consulted.      Interval Problem Update  -Admitted yesterday  -patient still having confusion  - echeverria in place, however not convinced this is UTI or urinary retention, will remove echeverria and do bladder scans  - consulted GI for proctitis  - BNP very elevated, started IV lasix  - had long conversation with daughter over the phone about patient's baseline and GOC, discussed code status, patient would NOT want CPR or intubation, switched to DNAR/DNI, total time = 20 minutes  - daughter also reported that patient has not been wanting to walk, refusing to even have legs bent, knees swollen    Consultants/Specialty  GI    Code Status  DNAR/DNI    Disposition  TBD    Review of Systems  Review of Systems   Unable to perform ROS: Dementia        Physical Exam  Temp:  [35.9  °C (96.7 °F)-36.5 °C (97.7 °F)] 36.5 °C (97.7 °F)  Pulse:  [84-97] 92  Resp:  [17-19] 18  BP: (100-142)/(48-69) 120/66  SpO2:  [95 %-98 %] 98 %    Physical Exam  Vitals and nursing note reviewed.   Constitutional:       General: She is not in acute distress.     Appearance: She is ill-appearing. She is not toxic-appearing or diaphoretic.   HENT:      Head: Normocephalic and atraumatic.      Nose: Nose normal.      Mouth/Throat:      Mouth: Mucous membranes are moist.   Neck:      Vascular: JVD present.   Cardiovascular:      Rate and Rhythm: Normal rate.      Heart sounds: No murmur heard.   No friction rub. No gallop.    Pulmonary:      Effort: Pulmonary effort is normal.      Breath sounds: Rales (bibasilar) present. No wheezing or rhonchi.   Abdominal:      General: Bowel sounds are normal. There is distension.      Palpations: Abdomen is soft.      Tenderness: There is abdominal tenderness. There is no guarding or rebound.   Genitourinary:     Comments: Gomes in place during exam  Musculoskeletal:      Right knee: Swelling present. No erythema. Decreased range of motion. Tenderness present.      Left knee: Swelling present. No erythema. Decreased range of motion. Tenderness present.      Right lower leg: No edema.      Left lower leg: No edema.   Skin:     Coloration: Skin is not jaundiced.      Findings: No rash.   Neurological:      Mental Status: She is alert. She is disoriented.   Psychiatric:         Behavior: Behavior normal.         Fluids    Intake/Output Summary (Last 24 hours) at 6/21/2021 1650  Last data filed at 6/21/2021 0530  Gross per 24 hour   Intake 1138.33 ml   Output 725 ml   Net 413.33 ml       Laboratory  Recent Labs     06/20/21  0956 06/21/21  0252   WBC 14.2* 17.7*   RBC 3.53* 3.37*   HEMOGLOBIN 9.7* 9.1*   HEMATOCRIT 30.5* 29.6*   MCV 86.4 87.8   MCH 27.5 27.0   MCHC 31.8* 30.7*   RDW 64.4* 66.0*   PLATELETCT 116* 160*   MPV 11.0 11.4     Recent Labs     06/20/21  0956 06/21/21  0252    SODIUM 125* 130*   POTASSIUM 3.8 3.5*   CHLORIDE 94* 100   CO2 22 21   GLUCOSE 161* 122*   BUN 16 18   CREATININE 0.63 0.64   CALCIUM 8.2* 7.5*     Recent Labs     06/20/21  1045   APTT 38.3*   INR 1.68*               Imaging  CT-ABDOMEN-PELVIS WITH   Final Result      1.  Wall thickening of the rectum is noted and there is induration around the perirectal space. Findings could be due to proctitis.      2.  Small right pleural effusion is slightly larger than on prior exam.      3.  Consolidation and volume loss is again noted in each lung base.      4.  Cardiomegaly is again noted.         CT-HEAD W/O   Final Result         NO ACUTE ABNORMALITIES ARE NOTED ON CT SCAN OF THE HEAD.      Findings are consistent with atrophy.  Decreased attenuation in the periventricular white matter likely indicates microvascular ischemic disease.      DX-CHEST-PORTABLE (1 VIEW)   Final Result         Ill-defined opacifications in each lung have increased compared to the prior radiograph.  This could indicate worsening of pulmonary edema or inflammation.      EC-ECHOCARDIOGRAM COMPLETE W/O CONT    (Results Pending)        Assessment/Plan  Sepsis (HCC)  Assessment & Plan  This is Sepsis Present on admission  SIRS criteria identified on my evaluation include: Fever, with temperature greater than 101 deg F, Tachypnea, with respirations greater than 20 per minute and Leukocytosis, with WBC greater than 12,000  Source is Fluid responsive, manifest by elevated white count, tachypnea and fever on admission.  Antibiotics for suspected source of intra-abdominal.  Perirectal wall thickening noted on CT scan c/w proctitis. Versus PNA (effusions and consolidations on CXR/CT)  Sepsis protocol initiated  Fluid resuscitation ordered per protocol  IV antibiotics as appropriate for source of sepsis  While organ dysfunction may be noted elsewhere in this problem list or in the chart, degree of organ dysfunction does not meet CMS criteria for severe  sepsis          Hypokalemia  Assessment & Plan  S/p supplementation  CTM since on lasix    Thrombocytopenia (HCC)- (present on admission)  Assessment & Plan  Continues to have intermittent low plt  On AC  No bleeding  CTM    Normocytic anemia  Assessment & Plan  Unsure etiology  No bleeding  H/H stable  CTM    Acute respiratory failure with hypoxia (HCC)- (present on admission)  Assessment & Plan  Requiring NC likely in setting of heart failure and sepsis +/- pna  CTM    Chronic combined systolic and diastolic congestive heart failure (HCC)  Assessment & Plan  Was dry on admission, received IVF per sepsis protocol but now appears mildly overloaded, JVD, rales, no LE edema though, BNP > 20,000, troponin 60-->66, no acute ST/T wave changes on ECG, unsure about chest pain, wouldn't answer me today, will get echo since cardiologist Dr. Ferrer wanted one last month anyway.   - lasix 20mg IV  - daily weights  - strict I/o  - monitor fluid status  - continue BB, ARB, statin    Gait instability- (present on admission)  Assessment & Plan  PT/OT Evals will be obtained.     Urinary retention- (present on admission)  Assessment & Plan  Had echeverria placed at home 6/15 for c/f urinary retention, was supposed to see urology this week  No UTI on 6/15  Will do TOV, if fails consider urology consult    Dementia ([facility])- (present on admission)  Assessment & Plan  History of dementia, baseline talkative, walks, eats on her own, does have some short term memory loss    Hyponatremia- (present on admission)  Assessment & Plan  Patient has history of chronic hyponatremia on diuretics   125-->130, however BNP > 20,000 so will start lasix  CTM  Urine studies were never collected, will see if we can add them to UA on admission since now starting lasix    Paroxysmal A-fib (HCC)- (present on admission)  Assessment & Plan  Continue home dose anticoagulation, continue home dose beta-blockade.    Hypertension- (present on admission)  Assessment &  Plan  Home regimen: Losartan 25 mg daily, Coreg 3.125 mg twice daily, Lasix every other day  Inpatient regimen: Losartan, Coreg, IV Lasix    DM (diabetes mellitus) (HCC)- (present on admission)  Assessment & Plan  A1c 7.2  Home regimen: tradjenta 5mg daily  Inpatient regimen: SSI       VTE prophylaxis: Eliquis

## 2021-06-21 NOTE — PROGRESS NOTES
4 Eyes Skin Assessment Completed by JAVI Romero, RN and VELIA Staples, RN.    Bruising to BUE. Rt heel boggy.    Head WDL  Ears WDL  Nose WDL  Mouth WDL  Neck WDL  Breast/Chest Scar  Shoulder Blades WDL  Spine WDL  (R) Arm/Elbow/Hand Scar  (L) Arm/Elbow/Hand Scar  Abdomen WDL  Groin WDL  Scrotum/Coccyx/Buttocks WDL  (R) Leg Swelling  (L) Leg Swelling  (R) Heel/Foot/Toe Blanching  (L) Heel/Foot/Toe WDL          Devices In Places Nasal Cannula      Interventions In Place Gray Ear Foams, NC W/Ear Foams and Heels Loaded W/Pillows    Possible Skin Injury No    Pictures Uploaded Into Epic N/A  Wound Consult Placed N/A  RN Wound Prevention Protocol Ordered Yes

## 2021-06-21 NOTE — PROGRESS NOTES
Community Health Worker Intake  • Social determinates of health intake Complete.   • Identified barriers to None.  • Contact information provided to Yoon Richards   • Has PCP appointment scheduled   • Accepted Meds-To-Beds.   • Inpatient assessment completed.    CHW Rangel called pts daughter Joy to introduce [facility] and [facility] programs. Pt active with [facility]. Joy states needs assistance changing pts oxygen to [employer] and requested for pt to be discharged with a bedside camode. CHW spoke with assigned CM and relayed the requests, CM states she will work on these. Joy Identifies no barriers to resources and expressed no needs at this time.     Plan: CHW will refer pt to [facility]

## 2021-06-21 NOTE — TELEPHONE ENCOUNTER
RO    Pts daughter Joy called wanting to let RO know that Pt is in the hospital and wanted to know if RO would go check on her.     Thank you

## 2021-06-21 NOTE — PROGRESS NOTES
Pt arrived to ED with echeverria in place. Order received from hospitalist. Echeverria replaced per protocol by Jeremias student and instructor.

## 2021-06-21 NOTE — DISCHARGE PLANNING
RN CM received a message from [facility] recommending:    -SNF, memory care, or home health  -Referral to geriatric are services    RN CM received a call from Ty with care management. Per Ty he spoke with the patient's daughter and she is requesting to have the patient switched to [employer] company as the patient had a change in insurance. The daughter is also requesting a beside commode to be ordered.     MD updated.

## 2021-06-22 ENCOUNTER — ANESTHESIA EVENT (OUTPATIENT)
Dept: SURGERY | Facility: MEDICAL CENTER | Age: 86
DRG: 871 | End: 2021-06-22
Payer: MEDICARE

## 2021-06-22 ENCOUNTER — ANESTHESIA (OUTPATIENT)
Dept: SURGERY | Facility: MEDICAL CENTER | Age: 86
DRG: 871 | End: 2021-06-22
Payer: MEDICARE

## 2021-06-22 LAB
ANION GAP SERPL CALC-SCNC: 9 MMOL/L (ref 7–16)
ANISOCYTOSIS BLD QL SMEAR: ABNORMAL
BACTERIA UR CULT: NORMAL
BASOPHILS # BLD AUTO: 0.2 % (ref 0–1.8)
BASOPHILS # BLD: 0.03 K/UL (ref 0–0.12)
BUN SERPL-MCNC: 28 MG/DL (ref 8–22)
BURR CELLS BLD QL SMEAR: NORMAL
CALCIUM SERPL-MCNC: 7.3 MG/DL (ref 8.5–10.5)
CHLORIDE SERPL-SCNC: 98 MMOL/L (ref 96–112)
CO2 SERPL-SCNC: 22 MMOL/L (ref 20–33)
COMMENT 1642: NORMAL
CREAT SERPL-MCNC: 0.71 MG/DL (ref 0.5–1.4)
CREAT UR-MCNC: 73.77 MG/DL
EKG IMPRESSION: NORMAL
EOSINOPHIL # BLD AUTO: 0.03 K/UL (ref 0–0.51)
EOSINOPHIL NFR BLD: 0.2 % (ref 0–6.9)
ERYTHROCYTE [DISTWIDTH] IN BLOOD BY AUTOMATED COUNT: 66.1 FL (ref 35.9–50)
GGT SERPL-CCNC: 22 U/L (ref 7–34)
GLUCOSE BLD-MCNC: 130 MG/DL (ref 65–99)
GLUCOSE BLD-MCNC: 146 MG/DL (ref 65–99)
GLUCOSE BLD-MCNC: 155 MG/DL (ref 65–99)
GLUCOSE BLD-MCNC: 166 MG/DL (ref 65–99)
GLUCOSE BLD-MCNC: 176 MG/DL (ref 65–99)
GLUCOSE SERPL-MCNC: 145 MG/DL (ref 65–99)
HCT VFR BLD AUTO: 27.5 % (ref 37–47)
HGB BLD-MCNC: 8.6 G/DL (ref 12–16)
HYPOCHROMIA BLD QL SMEAR: ABNORMAL
IMM GRANULOCYTES # BLD AUTO: 0.1 K/UL (ref 0–0.11)
IMM GRANULOCYTES NFR BLD AUTO: 0.6 % (ref 0–0.9)
LYMPHOCYTES # BLD AUTO: 0.35 K/UL (ref 1–4.8)
LYMPHOCYTES NFR BLD: 2.2 % (ref 22–41)
MACROCYTES BLD QL SMEAR: ABNORMAL
MAGNESIUM SERPL-MCNC: 1.7 MG/DL (ref 1.5–2.5)
MCH RBC QN AUTO: 27.2 PG (ref 27–33)
MCHC RBC AUTO-ENTMCNC: 31.3 G/DL (ref 33.6–35)
MCV RBC AUTO: 87 FL (ref 81.4–97.8)
MICROCYTES BLD QL SMEAR: ABNORMAL
MONOCYTES # BLD AUTO: 0.96 K/UL (ref 0–0.85)
MONOCYTES NFR BLD AUTO: 6.1 % (ref 0–13.4)
MORPHOLOGY BLD-IMP: NORMAL
NEUTROPHILS # BLD AUTO: 14.15 K/UL (ref 2–7.15)
NEUTROPHILS NFR BLD: 90.7 % (ref 44–72)
NRBC # BLD AUTO: 0 K/UL
NRBC BLD-RTO: 0 /100 WBC
OSMOLALITY UR: 427 MOSM/KG H2O (ref 300–900)
PATHOLOGY CONSULT NOTE: NORMAL
PLATELET # BLD AUTO: 161 K/UL (ref 164–446)
PLATELET BLD QL SMEAR: NORMAL
PMV BLD AUTO: 12.1 FL (ref 9–12.9)
POIKILOCYTOSIS BLD QL SMEAR: NORMAL
POLYCHROMASIA BLD QL SMEAR: NORMAL
POTASSIUM SERPL-SCNC: 3.2 MMOL/L (ref 3.6–5.5)
RBC # BLD AUTO: 3.16 M/UL (ref 4.2–5.4)
RBC BLD AUTO: PRESENT
SIGNIFICANT IND 70042: NORMAL
SITE SITE: NORMAL
SODIUM SERPL-SCNC: 129 MMOL/L (ref 135–145)
SODIUM UR-SCNC: 22 MMOL/L
SOURCE SOURCE: NORMAL
WBC # BLD AUTO: 15.6 K/UL (ref 4.8–10.8)

## 2021-06-22 PROCEDURE — 700102 HCHG RX REV CODE 250 W/ 637 OVERRIDE(OP): Performed by: HOSPITALIST

## 2021-06-22 PROCEDURE — 82962 GLUCOSE BLOOD TEST: CPT | Mod: 91

## 2021-06-22 PROCEDURE — 84080 ASSAY ALKALINE PHOSPHATASES: CPT

## 2021-06-22 PROCEDURE — 700101 HCHG RX REV CODE 250: Performed by: NURSE PRACTITIONER

## 2021-06-22 PROCEDURE — 99497 ADVNCD CARE PLAN 30 MIN: CPT | Performed by: STUDENT IN AN ORGANIZED HEALTH CARE EDUCATION/TRAINING PROGRAM

## 2021-06-22 PROCEDURE — 160202 HCHG ENDO MINUTES - 1ST 30 MINS LEVEL 3: Performed by: INTERNAL MEDICINE

## 2021-06-22 PROCEDURE — 84300 ASSAY OF URINE SODIUM: CPT

## 2021-06-22 PROCEDURE — 700111 HCHG RX REV CODE 636 W/ 250 OVERRIDE (IP): Performed by: INTERNAL MEDICINE

## 2021-06-22 PROCEDURE — 88305 TISSUE EXAM BY PATHOLOGIST: CPT | Mod: 59

## 2021-06-22 PROCEDURE — 85025 COMPLETE CBC W/AUTO DIFF WBC: CPT

## 2021-06-22 PROCEDURE — 160036 HCHG PACU - EA ADDL 30 MINS PHASE I: Performed by: INTERNAL MEDICINE

## 2021-06-22 PROCEDURE — 160035 HCHG PACU - 1ST 60 MINS PHASE I: Performed by: INTERNAL MEDICINE

## 2021-06-22 PROCEDURE — 160009 HCHG ANES TIME/MIN: Performed by: INTERNAL MEDICINE

## 2021-06-22 PROCEDURE — 82570 ASSAY OF URINE CREATININE: CPT

## 2021-06-22 PROCEDURE — 160048 HCHG OR STATISTICAL LEVEL 1-5: Performed by: INTERNAL MEDICINE

## 2021-06-22 PROCEDURE — 83735 ASSAY OF MAGNESIUM: CPT

## 2021-06-22 PROCEDURE — 700105 HCHG RX REV CODE 258: Performed by: ANESTHESIOLOGY

## 2021-06-22 PROCEDURE — 97166 OT EVAL MOD COMPLEX 45 MIN: CPT

## 2021-06-22 PROCEDURE — 700111 HCHG RX REV CODE 636 W/ 250 OVERRIDE (IP): Performed by: ANESTHESIOLOGY

## 2021-06-22 PROCEDURE — 84075 ASSAY ALKALINE PHOSPHATASE: CPT

## 2021-06-22 PROCEDURE — 700111 HCHG RX REV CODE 636 W/ 250 OVERRIDE (IP): Performed by: HOSPITALIST

## 2021-06-22 PROCEDURE — 160002 HCHG RECOVERY MINUTES (STAT): Performed by: INTERNAL MEDICINE

## 2021-06-22 PROCEDURE — 770006 HCHG ROOM/CARE - MED/SURG/GYN SEMI*

## 2021-06-22 PROCEDURE — 36415 COLL VENOUS BLD VENIPUNCTURE: CPT

## 2021-06-22 PROCEDURE — 93010 ELECTROCARDIOGRAM REPORT: CPT | Performed by: INTERNAL MEDICINE

## 2021-06-22 PROCEDURE — 0DBN8ZX EXCISION OF SIGMOID COLON, VIA NATURAL OR ARTIFICIAL OPENING ENDOSCOPIC, DIAGNOSTIC: ICD-10-PCS | Performed by: INTERNAL MEDICINE

## 2021-06-22 PROCEDURE — A9270 NON-COVERED ITEM OR SERVICE: HCPCS | Performed by: INTERNAL MEDICINE

## 2021-06-22 PROCEDURE — 82977 ASSAY OF GGT: CPT

## 2021-06-22 PROCEDURE — 97162 PT EVAL MOD COMPLEX 30 MIN: CPT

## 2021-06-22 PROCEDURE — 99233 SBSQ HOSP IP/OBS HIGH 50: CPT | Mod: 25 | Performed by: STUDENT IN AN ORGANIZED HEALTH CARE EDUCATION/TRAINING PROGRAM

## 2021-06-22 PROCEDURE — A9270 NON-COVERED ITEM OR SERVICE: HCPCS | Performed by: HOSPITALIST

## 2021-06-22 PROCEDURE — 0DBP8ZX EXCISION OF RECTUM, VIA NATURAL OR ARTIFICIAL OPENING ENDOSCOPIC, DIAGNOSTIC: ICD-10-PCS | Performed by: INTERNAL MEDICINE

## 2021-06-22 PROCEDURE — 700105 HCHG RX REV CODE 258: Performed by: HOSPITALIST

## 2021-06-22 PROCEDURE — 93005 ELECTROCARDIOGRAM TRACING: CPT | Performed by: STUDENT IN AN ORGANIZED HEALTH CARE EDUCATION/TRAINING PROGRAM

## 2021-06-22 PROCEDURE — 80048 BASIC METABOLIC PNL TOTAL CA: CPT

## 2021-06-22 PROCEDURE — 700102 HCHG RX REV CODE 250 W/ 637 OVERRIDE(OP): Performed by: INTERNAL MEDICINE

## 2021-06-22 PROCEDURE — 83935 ASSAY OF URINE OSMOLALITY: CPT

## 2021-06-22 RX ORDER — SODIUM CHLORIDE, SODIUM LACTATE, POTASSIUM CHLORIDE, CALCIUM CHLORIDE 600; 310; 30; 20 MG/100ML; MG/100ML; MG/100ML; MG/100ML
INJECTION, SOLUTION INTRAVENOUS
Status: DISCONTINUED | OUTPATIENT
Start: 2021-06-22 | End: 2021-06-22 | Stop reason: SURG

## 2021-06-22 RX ORDER — HALOPERIDOL 5 MG/ML
1 INJECTION INTRAMUSCULAR
Status: DISCONTINUED | OUTPATIENT
Start: 2021-06-22 | End: 2021-06-22 | Stop reason: HOSPADM

## 2021-06-22 RX ORDER — DIPHENHYDRAMINE HYDROCHLORIDE 50 MG/ML
12.5 INJECTION INTRAMUSCULAR; INTRAVENOUS
Status: DISCONTINUED | OUTPATIENT
Start: 2021-06-22 | End: 2021-06-22 | Stop reason: HOSPADM

## 2021-06-22 RX ORDER — SODIUM CHLORIDE, SODIUM LACTATE, POTASSIUM CHLORIDE, CALCIUM CHLORIDE 600; 310; 30; 20 MG/100ML; MG/100ML; MG/100ML; MG/100ML
INJECTION, SOLUTION INTRAVENOUS CONTINUOUS
Status: DISCONTINUED | OUTPATIENT
Start: 2021-06-22 | End: 2021-06-22 | Stop reason: HOSPADM

## 2021-06-22 RX ORDER — ONDANSETRON 2 MG/ML
4 INJECTION INTRAMUSCULAR; INTRAVENOUS
Status: DISCONTINUED | OUTPATIENT
Start: 2021-06-22 | End: 2021-06-22 | Stop reason: HOSPADM

## 2021-06-22 RX ADMIN — SODIUM CHLORIDE 3 G: 900 INJECTION INTRAVENOUS at 05:13

## 2021-06-22 RX ADMIN — ATORVASTATIN CALCIUM 20 MG: 20 TABLET, FILM COATED ORAL at 22:05

## 2021-06-22 RX ADMIN — SODIUM PHOSPHATE 133 ML: 7; 19 ENEMA RECTAL at 02:54

## 2021-06-22 RX ADMIN — SODIUM CHLORIDE 3 G: 900 INJECTION INTRAVENOUS at 17:50

## 2021-06-22 RX ADMIN — APIXABAN 2.5 MG: 5 TABLET, FILM COATED ORAL at 17:50

## 2021-06-22 RX ADMIN — APIXABAN 2.5 MG: 5 TABLET, FILM COATED ORAL at 05:13

## 2021-06-22 RX ADMIN — SODIUM CHLORIDE, POTASSIUM CHLORIDE, SODIUM LACTATE AND CALCIUM CHLORIDE: 600; 310; 30; 20 INJECTION, SOLUTION INTRAVENOUS at 13:23

## 2021-06-22 RX ADMIN — POTASSIUM CHLORIDE 20 MEQ: 1500 TABLET, EXTENDED RELEASE ORAL at 05:13

## 2021-06-22 RX ADMIN — FUROSEMIDE 20 MG: 10 INJECTION, SOLUTION INTRAMUSCULAR; INTRAVENOUS at 05:12

## 2021-06-22 RX ADMIN — INSULIN LISPRO 1 UNITS: 100 INJECTION, SOLUTION INTRAVENOUS; SUBCUTANEOUS at 22:07

## 2021-06-22 RX ADMIN — SODIUM CHLORIDE 3 G: 900 INJECTION INTRAVENOUS at 11:52

## 2021-06-22 RX ADMIN — INSULIN LISPRO 1 UNITS: 100 INJECTION, SOLUTION INTRAVENOUS; SUBCUTANEOUS at 17:49

## 2021-06-22 RX ADMIN — CARVEDILOL 3.12 MG: 6.25 TABLET, FILM COATED ORAL at 17:51

## 2021-06-22 RX ADMIN — CARVEDILOL 3.12 MG: 6.25 TABLET, FILM COATED ORAL at 10:09

## 2021-06-22 RX ADMIN — PROPOFOL 50 MG: 10 INJECTION, EMULSION INTRAVENOUS at 13:00

## 2021-06-22 ASSESSMENT — COGNITIVE AND FUNCTIONAL STATUS - GENERAL
HELP NEEDED FOR BATHING: A LOT
STANDING UP FROM CHAIR USING ARMS: TOTAL
EATING MEALS: A LITTLE
SUGGESTED CMS G CODE MODIFIER MOBILITY: CN
TURNING FROM BACK TO SIDE WHILE IN FLAT BAD: UNABLE
CLIMB 3 TO 5 STEPS WITH RAILING: TOTAL
MOVING TO AND FROM BED TO CHAIR: UNABLE
PERSONAL GROOMING: A LITTLE
SUGGESTED CMS G CODE MODIFIER DAILY ACTIVITY: CL
DRESSING REGULAR LOWER BODY CLOTHING: TOTAL
TOILETING: A LOT
DAILY ACTIVITIY SCORE: 13
MOBILITY SCORE: 6
DRESSING REGULAR UPPER BODY CLOTHING: A LOT
WALKING IN HOSPITAL ROOM: TOTAL
MOVING FROM LYING ON BACK TO SITTING ON SIDE OF FLAT BED: UNABLE

## 2021-06-22 ASSESSMENT — PAIN SCALES - GENERAL: PAIN_LEVEL: 0

## 2021-06-22 ASSESSMENT — PAIN DESCRIPTION - PAIN TYPE
TYPE: ACUTE PAIN

## 2021-06-22 ASSESSMENT — ACTIVITIES OF DAILY LIVING (ADL): TOILETING: REQUIRES ASSIST

## 2021-06-22 ASSESSMENT — GAIT ASSESSMENTS: GAIT LEVEL OF ASSIST: UNABLE TO PARTICIPATE

## 2021-06-22 ASSESSMENT — FIBROSIS 4 INDEX: FIB4 SCORE: 8.81

## 2021-06-22 NOTE — THERAPY
Physical Therapy   Initial Evaluation     Patient Name: Yoon Richards  Age:  89 y.o., Sex:  female  Medical Record #: [id number]  Today's Date: 6/22/2021     Precautions: Fall Risk    Assessment  Patient is 89 y.o. female presenting acutely with confusion and abdominal distention. PMH includes CAD, CHF, dementia, DM, and HTN. PTA pt was ambulatory household distances but received SPV for all mobility. Per dtr, pt with recent decline in mobility s/p catheter placement. Pt with impaired PROM of knees R>L, grimacing with passive flexion. She required max A for bed mob and to attempt standing. She was unable to achieve full upright standing 2/2 weakness. Family present at end of session and would be open to post-acute placement prior to DC home. Family would not consider alternative longterm care placement at this time however would like pt to be mobile prior to DC home.      Plan    Recommend Physical Therapy 3 times per week until therapy goals are met for the following treatments:  Bed Mobility, Neuro Re-Education / Balance, Therapeutic Activities and Therapeutic Exercises    DC Equipment Recommendations: Unable to determine at this time  Discharge Recommendations: Recommend post-acute placement for additional physical therapy services prior to discharge home     Objective     06/22/21 1604   Prior Living Situation   Prior Services Continuous (24 Hour) Care Giving Family;Intermittent Physical Support for ADL Per Service   Housing / Facility 1 Story House   Steps Into Home 2   Steps In Home 0   Equipment Owned 4-Wheel Walker   Lives with - Patient's Self Care Capacity Adult Children   Comments Has caregiver support, family provides 24/7   Prior Level of Functional Mobility   Bed Mobility Independent   Transfer Status Required Assist   Ambulation Required Assist   Distance Ambulation (Feet) (household distances)   Assistive Devices Used 4-Wheel Walker   Comments Dtr reports pt has primarily been bedbound x2 wks.     Cognition    Level of Consciousness (lethargic)   Ability To Follow Commands 1 Step (50% of the time)   Safety Awareness Impaired   New Learning Impaired   Sequencing Impaired   Initiation Impaired   Comments delayed initiation, poor sequencing for improved mobility   Passive ROM Lower Body   Comments Resistance to knee flexion >30-40 degress R>L   Active ROM Lower Body    Comments limited knee flexion d/t impaired knee joint mobility   Strength Lower Body   Comments not formally tested 2/2 cognition, unable to achieve full upright standing 2/2 quad and glut weakness   Neurological Concerns   Standing Posture During ADL's Posterior Lean   Balance Assessment   Sitting Balance (Static) Fair -   Sitting Balance (Dynamic) Poor +   Standing Balance (Static) Trace   Standing Balance (Dynamic) Dependent   Gait Analysis   Gait Level Of Assist Unable to Participate   Weight Bearing Status No restrictions   Bed Mobility    Supine to Sit Maximal Assist   Sit to Supine Maximal Assist   Functional Mobility   Sit to Stand Maximal Assist (of 2, unable to achieve full stand, 3 reps)   Bed, Chair, Wheelchair Transfer Unable to Participate   Short Term Goals    Short Term Goal # 1 Pt will perform supine<>sit with min A within 6 visits to improve participation for DC home with family.   Short Term Goal # 2 Pt will perform STS with min A within 6 visits to initiate transfers/gait.   Short Term Goal # 3 Pt will perform all fxnl transfers with FWW and mod A within 6 vistis to increase OOB activity.

## 2021-06-22 NOTE — PROGRESS NOTES
"Received report from night nurse, assumed care of patient   /59   Pulse 76   Temp 36.2 °C (97.2 °F)   Resp 16   Ht 1.6 m (5' 3\")   Wt 71.6 kg (157 lb 13.6 oz)   SpO2 99%   BMI 27.96 kg/m²   AOX1 PT confused, oriented only to self   PT reports pain with ambulation, medication rejected   PT has flex colonoscopy scheduled at 1500  NPO at midnight   Pt was straight cath 450ml urine received, order for echeverria placement post op received  High fall risk, bed alarm on  Call light within reach  Bed locked and in lowest position   All needs met at this time.    "

## 2021-06-22 NOTE — PROGRESS NOTES
Bladder scan revealed 449 mL. Pt unable to void. Dr. Driver updated, orders for straight cath received.

## 2021-06-22 NOTE — CARE PLAN
The patient is Watcher - Medium risk of patient condition declining or worsening    Shift Goals  Clinical Goals: Remain free from fall  Progress made toward(s) clinical / shift goals:     Patient is not progressing towards the following goals:      Problem: Knowledge Deficit - Standard  Goal: Patient and family/care givers will demonstrate understanding of plan of care, disease process/condition, diagnostic tests and medications  Outcome: Progressing       Problem: Skin Integrity  Goal: Skin integrity is maintained or improved  Outcome: Progressing

## 2021-06-22 NOTE — OR NURSING
1322 Pt arrived from OR, rec'd report from . VSS. No s/s of resp distress. Pt arousable, denies pain.     1345 Pt resting, appears comfortable.     1400 Called report to RENEE Juan.     1420 Pt resting, awaiting transport.     1426 Pt taken via transport on [facility]. Pt appeared stable.

## 2021-06-22 NOTE — THERAPY
Occupational Therapy   Initial Evaluation     Patient Name: Yoon Richards  Age:  89 y.o., Sex:  female  Medical Record #: [id number]  Today's Date: 6/22/2021     Precautions  Precautions: Fall Risk    Assessment  Patient is 89 y.o. female presenting acutely with confusion and abdominal distention. PMHx includes CAD, CHF, dementia, DM, and HTN. Pt presents to OT eval with generalized weakness, knee pain, decreased activity tolerance, and impaired cognition affecting her participation in ADLs. Pt required maxA for LB dressing, spv for bed level grooming/hygiene, and was unable to complete any ADL transfers this session. Pt demonstrated delayed responses to commands and poor sequencing/initiation of tasks. Pt able to stand EOB with maxA, but with little buttocks clearance and posterior lean. Pts family and caregivers assist with most ADLs at baseline, however per family pt was able to ambulate in home and complete ADL transfers with spv. Pt was also able to toilet with spv. Pt is functioning far below baseline for toileting and ADL transfers and would benefit from acute OT tx.      Plan    Recommend Occupational Therapy 3 times per week until therapy goals are met for the following treatments:  Adaptive Equipment, Manual Therapy Techniques, Neuro Re-Education / Balance, Self Care/Activities of Daily Living, Therapeutic Activities and Therapeutic Exercises.    DC Equipment Recommendations: Tub Transfer Bench, Grab Bar(s) in Tub / Shower, Bed Side Commode  Discharge Recommendations: Recommend post-acute placement for additional occupational therapy services prior to discharge home.     Subjective    Pt alert and cooperative. Family present in room during session. C/o bilateral knee pain.      Objective       06/22/21 1606   Prior Living Situation   Prior Services Continuous (24 Hour) Care Giving Family;Continuous (24 Hour) Care Giving Per Service   Housing / Facility [address] [place] [address]   Steps Into Home 2  (Simultaneous  filing. User may not have seen previous data.)   Steps In Home 0   Bathroom Set up Bathtub / Shower Combination;Shower Chair   Equipment Owned 4-Wheel Walker;Front-Wheel Walker;Tub / Shower Seat   Lives with - Patient's Self Care Capacity Adult Children   Comments Pt lives with daughter and ARTUR. She has caregivers 3 days per week and pts son comes over on days the caregiver is not there. Pt has 24/7 care/spv.   Prior Level of ADL Function   Self Feeding Independent   Grooming / Hygiene Requires Assist   Bathing Requires Assist   Dressing Requires Assist   Toileting Requires Assist   Comments family or caregivers provide assist for all ADLs at baseline, however pt does participate during ADLs.   Prior Level of IADL Function   Medication Management Requires Assist   Laundry Requires Assist   Kitchen Mobility Requires Assist   Finances Requires Assist   Home Management Requires Assist   Shopping Requires Assist   Prior Level Of Mobility Supervision With Device in Home;Supervision With Device in Community   Driving / Transportation Relatives / Others Provide Transportation   Occupation (Pre-Hospital Vocational) Retired Due To Age   Leisure Interests Exercise   Cognition    Cognition / Consciousness X   Level of Consciousness Alert   Ability To Follow Commands 1 Step   Safety Awareness Impaired   New Learning Impaired   Sequencing Impaired   Initiation Impaired   Comments cooperative, minimally verbal throughout session, family present during session to answer questions, delayed initiation to commands, posterior lean   Strength Upper Body   Upper Body Strength  X   Gross Strength Generalized Weakness, Equal Bilaterally.    Balance Assessment   Comments seated with SBA, standing with maxA using FWW, little buttocks clearance from bed   Bed Mobility    Supine to Sit   (NT, pt up with PT upon arrival to room)   Sit to Supine Maximal Assist   ADL Assessment   Eating Supervision   Grooming Supervision;Seated  (oral care)    Lower Body Dressing Maximal Assist  (socks)   Toileting   (not able to get to commode)   Functional Mobility   Sit to Stand Maximal Assist   Toilet Transfers Unable to Participate   Mobility EOB, STS x3   Edema / Skin Assessment   Edema / Skin  X   Comments skin redness in perianal/buttocks region   Activity Tolerance   Comments limited activity tolerance   Patient / Family Goals   Patient / Family Goal #1 To get stronger and go home   Short Term Goals   Short Term Goal # 1 Pt will complete toilet transfer to [facility] if needed with Josselyn by discharge.   Short Term Goal # 2 Pt will complete toileting with Josselyn by discharge.

## 2021-06-22 NOTE — CARE PLAN
The patient is Watcher - Medium risk of patient condition declining or worsening    Shift Goals  Clinical Goals: Remain free from falls    Progress made toward(s) clinical / shift goals:    Problem: Skin Integrity  Goal: Skin integrity is maintained or improved  Outcome: Progressing     Problem: Pain - Standard  Goal: Alleviation of pain or a reduction in pain to the patient’s comfort goal  Outcome: Progressing       Patient is not progressing towards the following goals:      Problem: Knowledge Deficit - Standard  Goal: Patient and family/care givers will demonstrate understanding of plan of care, disease process/condition, diagnostic tests and medications  Outcome: Not Progressing   Patient confused, family does not have realistic goals given patients current mental status. RN and MD had lengthy discussion concerning assessment and plan. Will continue to educated and work with family on plan of care

## 2021-06-22 NOTE — PROGRESS NOTES
Patient was unable to void despite feeling need, RN bladder scanned for 450 ml, RN straight cathed for 450 ml yellow urine per order.

## 2021-06-22 NOTE — PROGRESS NOTES
Received report from day shift RN. Assumed patient care  Patient only oriented to self  Patient experiences pain with movement with turns  Poor PO intake, 1:1 feeder. NPO at midnight  No complaints of nausea  +BM (incontinant).   Patient has not voided post echeverria removal. Bladder scan showed 217  High fall risk, bed alarm on  Call light within reach  Bed locked and in low position   POC discussed with patient  All needs met at this time.

## 2021-06-22 NOTE — PROGRESS NOTES
[facility] Medicine Daily Progress Note    Date of Service  6/22/2021    Chief Complaint  89 y.o. female admitted 6/20/2021 with confusion and abdominal distention    Hospital Course  89-year-old female with a history of CAD, chronic systolic heart failure, dementia, diabetes, hypertension admitted with worsening confusion and abdominal distention.  At baseline patient can walk around, eat by herself, conversive, normal long-term memory but has trouble with short-term memory, lives with family.  Last week patient noted to have difficulty urinating so home health nurse placed Echeverria which caused significant pain and irritation so patient was taken to the ED on 6/15 and urinalysis concerning for possible UTI so was started on oral antibiotics however urine culture was negative.  Patient continued to have worsening confusion and abdominal distention.  Brought into the ED again on 6/20, she was febrile, tachypneic.  CT scan found wall thickening of the rectum concerning for proctitis as well as small right pleural effusion, consolidation and volume loss and each lung and cardiomegaly.  CT head negative for acute findings.  She was started on IV antibiotics.  GI consulted.      Interval Problem Update    -patient still having confusion, easily aroused  - echeverria in place, however not convinced this is UTI or urinary retention, will remove echeverria and do bladder scans; failed void trial; patient has an appointment with Callum Rao on Thursday; texted her via [employer] and hopefully she canl come and see patient  - consulted GI for proctitis, planning for sigmoidoscopy today  - BNP very elevated, started IV lasix 20 mg  - had long conversation with daughter over the phone about patient's baseline and GOC, discussed code status, patient would NOT want CPR or intubation, switched to DNAR/DNI, total time = 20 minutes  - daughter also reported that patient has not been wanting to walk, refusing to even have legs bent, knees  swollen  -Per daughter patient has leg pain has been bed rested for few days; ordered ultrasound to rule out DVT    Consultants/Specialty  GI    Code Status  DNAR/DNI    Disposition  TBD    Review of Systems  Review of Systems   Unable to perform ROS: Dementia        Physical Exam  Temp:  [36.2 °C (97.1 °F)-37 °C (98.6 °F)] 36.3 °C (97.3 °F)  Pulse:  [89-93] 89  Resp:  [18] 18  BP: (120-131)/(55-66) 125/55  SpO2:  [93 %-100 %] 93 %    Physical Exam  Vitals and nursing note reviewed.   Constitutional:       General: She is not in acute distress.     Appearance: She is ill-appearing. She is not toxic-appearing or diaphoretic.   HENT:      Head: Normocephalic and atraumatic.      Nose: Nose normal.      Mouth/Throat:      Mouth: Mucous membranes are moist.   Neck:      Vascular: JVD present.   Cardiovascular:      Rate and Rhythm: Normal rate.      Heart sounds: No murmur heard.   No friction rub. No gallop.    Pulmonary:      Effort: Pulmonary effort is normal.      Breath sounds: Rales (bibasilar) present. No wheezing or rhonchi.   Abdominal:      General: Bowel sounds are normal. There is distension.      Palpations: Abdomen is soft.      Tenderness: There is abdominal tenderness. There is no guarding or rebound.   Genitourinary:     Comments: Gomes in place during exam  Musculoskeletal:      Right knee: Swelling present. No erythema. Decreased range of motion. Tenderness present.      Left knee: Swelling present. No erythema. Decreased range of motion. Tenderness present.      Right lower leg: No edema.      Left lower leg: No edema.   Skin:     Coloration: Skin is not jaundiced.      Findings: No rash.   Neurological:      Mental Status: She is alert. She is disoriented.   Psychiatric:         Behavior: Behavior normal.         Fluids    Intake/Output Summary (Last 24 hours) at 6/22/2021 1944  Last data filed at 6/22/2021 0330  Gross per 24 hour   Intake 100 ml   Output 350 ml   Net -250 ml        Laboratory  Recent Labs     06/20/21  0956 06/21/21  0252   WBC 14.2* 17.7*   RBC 3.53* 3.37*   HEMOGLOBIN 9.7* 9.1*   HEMATOCRIT 30.5* 29.6*   MCV 86.4 87.8   MCH 27.5 27.0   MCHC 31.8* 30.7*   RDW 64.4* 66.0*   PLATELETCT 116* 160*   MPV 11.0 11.4     Recent Labs     06/20/21  0956 06/21/21  0252 06/22/21  0403   SODIUM 125* 130* 129*   POTASSIUM 3.8 3.5* 3.2*   CHLORIDE 94* 100 98   CO2 22 21 22   GLUCOSE 161* 122* 145*   BUN 16 18 28*   CREATININE 0.63 0.64 0.71   CALCIUM 8.2* 7.5* 7.3*     Recent Labs     06/20/21  1045   APTT 38.3*   INR 1.68*               Imaging  CT-ABDOMEN-PELVIS WITH   Final Result      1.  Wall thickening of the rectum is noted and there is induration around the perirectal space. Findings could be due to proctitis.      2.  Small right pleural effusion is slightly larger than on prior exam.      3.  Consolidation and volume loss is again noted in each lung base.      4.  Cardiomegaly is again noted.         CT-HEAD W/O   Final Result         NO ACUTE ABNORMALITIES ARE NOTED ON CT SCAN OF THE HEAD.      Findings are consistent with atrophy.  Decreased attenuation in the periventricular white matter likely indicates microvascular ischemic disease.      DX-CHEST-PORTABLE (1 VIEW)   Final Result         Ill-defined opacifications in each lung have increased compared to the prior radiograph.  This could indicate worsening of pulmonary edema or inflammation.      EC-ECHOCARDIOGRAM COMPLETE W/O CONT    (Results Pending)        Assessment/Plan  Hypokalemia  Assessment & Plan  S/p supplementation  CTM since on lasix    Thrombocytopenia (HCC)- (present on admission)  Assessment & Plan  Continues to have intermittent low plt  On AC  No bleeding  CTM    Normocytic anemia  Assessment & Plan  Unsure etiology  No bleeding  H/H stable  CTM    Acute respiratory failure with hypoxia (HCC)- (present on admission)  Assessment & Plan  Requiring NC likely in setting of heart failure and sepsis +/-  pna  CTM    Chronic combined systolic and diastolic congestive heart failure (HCC)  Assessment & Plan  Was dry on admission, received IVF per sepsis protocol but now appears mildly overloaded, JVD, rales, no LE edema though, BNP > 20,000, troponin 60-->66, no acute ST/T wave changes on ECG, unsure about chest pain, wouldn't answer me today, will get echo since cardiologist Dr. Ferrer wanted one last month anyway.   - lasix 20mg IV  - daily weights  - strict I/o  - monitor fluid status  - continue BB, ARB, statin    Sepsis ([facility])  Assessment & Plan  This is Sepsis Present on admission  SIRS criteria identified on my evaluation include: Fever, with temperature greater than 101 deg F, Tachypnea, with respirations greater than 20 per minute and Leukocytosis, with WBC greater than 12,000  Source is Fluid responsive, manifest by elevated white count, tachypnea and fever on admission.  Antibiotics for suspected source of intra-abdominal.  Perirectal wall thickening noted on CT scan c/w proctitis. Versus PNA (effusions and consolidations on CXR/CT)  Sepsis protocol initiated  Fluid resuscitation ordered per protocol  IV antibiotics as appropriate for source of sepsis  While organ dysfunction may be noted elsewhere in this problem list or in the chart, degree of organ dysfunction does not meet CMS criteria for severe sepsis          Gait instability- (present on admission)  Assessment & Plan  PT/OT Evals will be obtained.     Urinary retention- (present on admission)  Assessment & Plan  Had echeverria placed at home 6/15 for c/f urinary retention, was supposed to see urology this week  No UTI on 6/15  Will do TOV, failed   Patient has urology appointment on Thursday with Callum Rao, voalted her    Dementia ([facility])- (present on admission)  Assessment & Plan  History of dementia, baseline talkative, walks, eats on her own, does have some short term memory loss    Hyponatremia- (present on admission)  Assessment & Plan  Patient has  history of chronic hyponatremia on diuretics   125-->130, however BNP > 20,000 so will start lasix  CTM  Urine studies were never collected, will see if we can add them to UA on admission since now starting lasix    Paroxysmal A-fib (HCC)- (present on admission)  Assessment & Plan  Continue home dose anticoagulation, continue home dose beta-blockade.    Hypertension- (present on admission)  Assessment & Plan  Home regimen: Losartan 25 mg daily, Coreg 3.125 mg twice daily, Lasix every other day  Inpatient regimen: Losartan, Coreg, IV Lasix    DM (diabetes mellitus) (HCC)- (present on admission)  Assessment & Plan  A1c 7.2  Home regimen: tradjenta 5mg daily  Inpatient regimen: SSI       VTE prophylaxis: Eliquis

## 2021-06-22 NOTE — ANESTHESIA TIME REPORT
Anesthesia Start and Stop Event Times     Date Time Event    6/22/2021 1241 Ready for Procedure     1257 Anesthesia Start     1323 Anesthesia Stop        Responsible Staff  06/22/21    Name Role Begin End    Josué Narayanan M.D. Anesth 1257 1323        Preop Diagnosis (Free Text):  Pre-op Diagnosis     abd distention, abd pain        Preop Diagnosis (Codes):    Post op Diagnosis  Abdominal pain      Premium Reason  Non-Premium    Comments:

## 2021-06-22 NOTE — PROCEDURES
Flexible Sigmoidoscopy  Date of Procedure: 6/22/2021  Attending Physician: Jesus Manuel Bond MD      Indications: Abnormal CT scan  Instrument: Olympus Flexible Variable Endoscope  Sedation:     Anesthesiologist/Type of Anesthesia:  Anesthesiologist: Josué Narayanan M.D./General    Surgical Staff:  Circulator: Jose David Aguilera R.N.  Endoscopy Technician: Gunnar Watkins; Aleshia Ramos  Endoscopy Nurse: Lisa Roland R.N.    Specimens removed if any:  ID Type Source Tests Collected by Time Destination   A : Bx removal of Sigmoid Polyps x2 Polyp Colon - Sigmoid PATHOLOGY SPECIMEN Jesus Manuel Bond M.D. 6/22/2021  1:08 PM    B : left colon bx to R/O microscopic disease Tissue Colon PATHOLOGY SPECIMEN Jesus Manuel Bond M.D. 6/22/2021  1:09 PM    C : bx to r/o microscopic disease Tissue Rectal PATHOLOGY SPECIMEN Jesus Manuel Bond M.D. 6/22/2021  1:09 PM        Anesthesiologist/Type of Anesthesia:  Anesthesiologist: Josué Narayanan M.D./General    Surgical Staff:  Circulator: Jose David Aguilera R.N.  Endoscopy Technician: Gunnar Watkins; Aleshia Ramos  Endoscopy Nurse: Lisa Roland R.N.    Specimens removed if any:  ID Type Source Tests Collected by Time Destination   A : Bx removal of Sigmoid Polyps x2 Polyp Colon - Sigmoid PATHOLOGY SPECIMEN Jesus Manuel Bond M.D. 6/22/2021  1:08 PM    B : left colon bx to R/O microscopic disease Tissue Colon PATHOLOGY SPECIMEN Jesus Manuel Bond M.D. 6/22/2021  1:09 PM    C : bx to r/o microscopic disease Tissue Rectal PATHOLOGY SPECIMEN Jesus Manuel Bond M.D. 6/22/2021  1:09 PM        Pre-Anesthesia Assessment:  Prior to the procedure, a History and Physical was performed, and patient medications and allergies were reviewed. The patient’s tolerance of previous anesthesia was also reviewed. The risks and benefits of the procedure and the sedation options and risks were discussed with the patient including but not limited to infection, bleeding, aspiration, perforation, adverse medication reaction, missed  diagnosis, and missed lesions. The patient verbalized understanding. All questions were answered, and informed consent was obtained.     After I obtained informed consent from the patient, the patient was placed in the left lateral position. Appropriate time-out protocol was followed: the correct patient, the correct procedure, and the correct equipment in the room were confirmed. Throughout the procedure, the patient’s blood pressure, pulse, and oxygen saturations were monitored continuously. Digital rectal exam was performed. The Olympus variable stiffness endoscope was introduced through the anus and passed under direct vision. The scope was advanced to 40cm from the anus. The procedure was performed without difficulty. The patient tolerated the procedure well. The quality of the bowel preparation was good. Findings and interventions were performed and documented below. The endoscope was then removed and the procedure was terminated. There were no immediate postoperative complications.       Findings:    Digital rectal examination demonstrated solid stool in the rectal vault.  No mass palpable on rectal examination.  Scope inserted to 40 cm from anus.  Solid stool noted in the rectum as well.  No gross abnormal mucosa seen of the sigmoid colon.  Biopsies taken of the left side colon to ensure no microscopic inflammation.  Diminutive polyps x 2 (2mm and 2mm) removed with cold forcep biopsies.  Rectum mucosa appears slightly edematous but without mass lesion or obvious erythema.  Biopsies taken.  Retroflexion with small hemorrhoids.  No mass seen.  Please note extensive suctioning and digital fecal disimpaction was performed for evaluation of the rectum    Impressions:   1.  Slightly edematous rectal mucosa without ulceration status post biopsy  2.  2 diminutive sigmoid polyp removed  3.  Left-sided sigmoid colon biopsy  4.  Solid stool in the rectal vault, removed    Recommendations:   1.  Await biopsy results  2.   Monitor for postprocedure complication including perforation bleeding  3.  May advance diet as tolerated previous diet  4.  Unlikely colon finding the cause of patient's leukocytosis      This note was generated using voice recognition software which has a small chance of producing errors of grammar and possibly content. I have made every reasonable attempt to find and correct any obvious errors, but expect that some may not be found prior to finalization of this note

## 2021-06-22 NOTE — ANESTHESIA PREPROCEDURE EVALUATION
Relevant Problems   CARDIAC   (positive) Acute exacerbation of CHF (congestive heart failure) (HCC)   (positive) Chronic combined systolic and diastolic congestive heart failure (HCC)   (positive) Hypertension   (positive) Paroxysmal A-fib (HCC)       Physical Exam    Airway   Mallampati: II  TM distance: >3 FB  Neck ROM: full       Cardiovascular - normal exam  Rhythm: regular  Rate: normal  (-) murmur     Dental - normal exam           Pulmonary - normal exam  Breath sounds clear to auscultation     Abdominal    Neurological - normal exam                 Anesthesia Plan    ASA 3   ASA physical status 3 criteria: CAD/stents (> 3 months)    Plan - MAC               Induction: intravenous      Pertinent diagnostic labs and testing reviewed    Informed Consent:    Anesthetic plan and risks discussed with patient.    Use of blood products discussed with: patient whom consented to blood products.

## 2021-06-22 NOTE — ANESTHESIA POSTPROCEDURE EVALUATION
Patient: Yoon Richards    Procedure Summary     Date: 06/22/21 Room / Location: [facility] ROOM 26 / SURGERY SAME DAY [facility]    Anesthesia Start: 1257 Anesthesia Stop:     Procedures:       SIGMOIDOSCOPY - FLEXIBLE WITH BIOSPY (N/A Anus)      SIGMOIDOSCOPY, FLEXIBLE, WITH BIOPSY (N/A Anus) Diagnosis: (Polyps)    Surgeons: Jesus Manuel Bond M.D. Responsible Provider: Josué Narayanan M.D.    Anesthesia Type: MAC ASA Status: 3          Final Anesthesia Type: MAC  Last vitals  BP   110/66   Temp   36.2 °C (97.2 °F)    Pulse   74   Resp   18    SpO2   99 %      Anesthesia Post Evaluation    Patient location during evaluation: PACU  Patient participation: complete - patient participated  Level of consciousness: awake and alert  Pain score: 0    Airway patency: patent  Anesthetic complications: no  Cardiovascular status: hemodynamically stable  Respiratory status: acceptable  Hydration status: euvolemic    PONV: none          No complications documented.     Nurse Pain Score: 0 (NPRS)

## 2021-06-22 NOTE — PROGRESS NOTES
2 RN skin check complete  Generalized integumentary fragile   Bruising to right hand  Abdominal distention  BLE edema  Sacral redness, blanching  No other skin issues noted

## 2021-06-23 ENCOUNTER — APPOINTMENT (OUTPATIENT)
Dept: CARDIOLOGY | Facility: MEDICAL CENTER | Age: 86
DRG: 871 | End: 2021-06-23
Attending: INTERNAL MEDICINE
Payer: MEDICARE

## 2021-06-23 ENCOUNTER — APPOINTMENT (OUTPATIENT)
Dept: RADIOLOGY | Facility: MEDICAL CENTER | Age: 86
DRG: 871 | End: 2021-06-23
Attending: STUDENT IN AN ORGANIZED HEALTH CARE EDUCATION/TRAINING PROGRAM
Payer: MEDICARE

## 2021-06-23 ENCOUNTER — HOME CARE VISIT (OUTPATIENT)
Dept: HOME HEALTH SERVICES | Facility: HOME HEALTHCARE | Age: 86
End: 2021-06-23
Payer: MEDICARE

## 2021-06-23 PROBLEM — J96.11 CHRONIC RESPIRATORY FAILURE WITH HYPOXIA (HCC): Status: ACTIVE | Noted: 2021-06-21

## 2021-06-23 PROBLEM — K62.89 PROCTITIS: Status: ACTIVE | Noted: 2021-06-23

## 2021-06-23 PROBLEM — E11.65 TYPE 2 DIABETES MELLITUS WITH HYPERGLYCEMIA, WITHOUT LONG-TERM CURRENT USE OF INSULIN (HCC): Status: ACTIVE | Noted: 2021-03-04

## 2021-06-23 PROBLEM — I50.22 CHRONIC SYSTOLIC HEART FAILURE (HCC): Status: ACTIVE | Noted: 2021-06-20

## 2021-06-23 PROBLEM — E83.42 HYPOMAGNESEMIA: Status: ACTIVE | Noted: 2021-06-23

## 2021-06-23 LAB
ANION GAP SERPL CALC-SCNC: 9 MMOL/L (ref 7–16)
BUN SERPL-MCNC: 25 MG/DL (ref 8–22)
CALCIUM SERPL-MCNC: 7.5 MG/DL (ref 8.5–10.5)
CHLORIDE SERPL-SCNC: 102 MMOL/L (ref 96–112)
CO2 SERPL-SCNC: 19 MMOL/L (ref 20–33)
CREAT SERPL-MCNC: 0.61 MG/DL (ref 0.5–1.4)
E COLI SXT1+2 STL IA: NORMAL
ERYTHROCYTE [DISTWIDTH] IN BLOOD BY AUTOMATED COUNT: 66.4 FL (ref 35.9–50)
GLUCOSE BLD-MCNC: 140 MG/DL (ref 65–99)
GLUCOSE BLD-MCNC: 166 MG/DL (ref 65–99)
GLUCOSE BLD-MCNC: 173 MG/DL (ref 65–99)
GLUCOSE BLD-MCNC: 235 MG/DL (ref 65–99)
GLUCOSE SERPL-MCNC: 140 MG/DL (ref 65–99)
HCT VFR BLD AUTO: 28.4 % (ref 37–47)
HGB BLD-MCNC: 8.8 G/DL (ref 12–16)
LV EJECT FRACT  99904: 30
LV EJECT FRACT MOD 2C 99903: 44.77
LV EJECT FRACT MOD 4C 99902: 24.65
LV EJECT FRACT MOD BP 99901: 35.4
MAGNESIUM SERPL-MCNC: 2.1 MG/DL (ref 1.5–2.5)
MCH RBC QN AUTO: 27.3 PG (ref 27–33)
MCHC RBC AUTO-ENTMCNC: 31 G/DL (ref 33.6–35)
MCV RBC AUTO: 88.2 FL (ref 81.4–97.8)
PHOSPHATE SERPL-MCNC: 1.5 MG/DL (ref 2.5–4.5)
PLATELET # BLD AUTO: 155 K/UL (ref 164–446)
PMV BLD AUTO: 11.5 FL (ref 9–12.9)
POTASSIUM SERPL-SCNC: 3.2 MMOL/L (ref 3.6–5.5)
POTASSIUM SERPL-SCNC: 3.3 MMOL/L (ref 3.6–5.5)
RBC # BLD AUTO: 3.22 M/UL (ref 4.2–5.4)
SIGNIFICANT IND 70042: NORMAL
SITE SITE: NORMAL
SODIUM SERPL-SCNC: 130 MMOL/L (ref 135–145)
SOURCE SOURCE: NORMAL
WBC # BLD AUTO: 10.4 K/UL (ref 4.8–10.8)

## 2021-06-23 PROCEDURE — A9270 NON-COVERED ITEM OR SERVICE: HCPCS | Performed by: INTERNAL MEDICINE

## 2021-06-23 PROCEDURE — A9270 NON-COVERED ITEM OR SERVICE: HCPCS | Performed by: FAMILY MEDICINE

## 2021-06-23 PROCEDURE — 85027 COMPLETE CBC AUTOMATED: CPT

## 2021-06-23 PROCEDURE — 700117 HCHG RX CONTRAST REV CODE 255: Performed by: INTERNAL MEDICINE

## 2021-06-23 PROCEDURE — 93970 EXTREMITY STUDY: CPT

## 2021-06-23 PROCEDURE — 84100 ASSAY OF PHOSPHORUS: CPT

## 2021-06-23 PROCEDURE — 82962 GLUCOSE BLOOD TEST: CPT | Mod: 91

## 2021-06-23 PROCEDURE — 93306 TTE W/DOPPLER COMPLETE: CPT

## 2021-06-23 PROCEDURE — 700111 HCHG RX REV CODE 636 W/ 250 OVERRIDE (IP): Performed by: INTERNAL MEDICINE

## 2021-06-23 PROCEDURE — 770006 HCHG ROOM/CARE - MED/SURG/GYN SEMI*

## 2021-06-23 PROCEDURE — 84132 ASSAY OF SERUM POTASSIUM: CPT

## 2021-06-23 PROCEDURE — 36415 COLL VENOUS BLD VENIPUNCTURE: CPT

## 2021-06-23 PROCEDURE — 93306 TTE W/DOPPLER COMPLETE: CPT | Mod: 26 | Performed by: INTERNAL MEDICINE

## 2021-06-23 PROCEDURE — 700102 HCHG RX REV CODE 250 W/ 637 OVERRIDE(OP): Performed by: INTERNAL MEDICINE

## 2021-06-23 PROCEDURE — 700102 HCHG RX REV CODE 250 W/ 637 OVERRIDE(OP): Performed by: FAMILY MEDICINE

## 2021-06-23 PROCEDURE — 99233 SBSQ HOSP IP/OBS HIGH 50: CPT | Performed by: FAMILY MEDICINE

## 2021-06-23 PROCEDURE — 700102 HCHG RX REV CODE 250 W/ 637 OVERRIDE(OP): Performed by: HOSPITALIST

## 2021-06-23 PROCEDURE — 700111 HCHG RX REV CODE 636 W/ 250 OVERRIDE (IP): Performed by: HOSPITALIST

## 2021-06-23 PROCEDURE — 83735 ASSAY OF MAGNESIUM: CPT

## 2021-06-23 PROCEDURE — A9270 NON-COVERED ITEM OR SERVICE: HCPCS | Performed by: HOSPITALIST

## 2021-06-23 PROCEDURE — 80048 BASIC METABOLIC PNL TOTAL CA: CPT

## 2021-06-23 PROCEDURE — 700105 HCHG RX REV CODE 258: Performed by: HOSPITALIST

## 2021-06-23 RX ORDER — AMOXICILLIN 250 MG
2 CAPSULE ORAL DAILY
Status: DISCONTINUED | OUTPATIENT
Start: 2021-06-23 | End: 2021-06-29 | Stop reason: HOSPADM

## 2021-06-23 RX ORDER — POLYETHYLENE GLYCOL 3350 17 G/17G
1 POWDER, FOR SOLUTION ORAL DAILY
Status: DISCONTINUED | OUTPATIENT
Start: 2021-06-23 | End: 2021-06-29 | Stop reason: HOSPADM

## 2021-06-23 RX ORDER — BISACODYL 10 MG
10 SUPPOSITORY, RECTAL RECTAL
Status: DISCONTINUED | OUTPATIENT
Start: 2021-06-23 | End: 2021-06-29 | Stop reason: HOSPADM

## 2021-06-23 RX ORDER — POTASSIUM CHLORIDE 20 MEQ/1
20 TABLET, EXTENDED RELEASE ORAL 2 TIMES DAILY
Status: DISCONTINUED | OUTPATIENT
Start: 2021-06-23 | End: 2021-06-29 | Stop reason: HOSPADM

## 2021-06-23 RX ADMIN — POTASSIUM CHLORIDE 20 MEQ: 1500 TABLET, EXTENDED RELEASE ORAL at 17:41

## 2021-06-23 RX ADMIN — INSULIN LISPRO 2 UNITS: 100 INJECTION, SOLUTION INTRAVENOUS; SUBCUTANEOUS at 17:52

## 2021-06-23 RX ADMIN — INSULIN LISPRO 1 UNITS: 100 INJECTION, SOLUTION INTRAVENOUS; SUBCUTANEOUS at 21:10

## 2021-06-23 RX ADMIN — APIXABAN 2.5 MG: 5 TABLET, FILM COATED ORAL at 17:41

## 2021-06-23 RX ADMIN — POTASSIUM CHLORIDE 20 MEQ: 1500 TABLET, EXTENDED RELEASE ORAL at 06:50

## 2021-06-23 RX ADMIN — SODIUM CHLORIDE 3 G: 900 INJECTION INTRAVENOUS at 00:15

## 2021-06-23 RX ADMIN — APIXABAN 2.5 MG: 5 TABLET, FILM COATED ORAL at 06:49

## 2021-06-23 RX ADMIN — SODIUM CHLORIDE 3 G: 900 INJECTION INTRAVENOUS at 10:29

## 2021-06-23 RX ADMIN — HUMAN ALBUMIN MICROSPHERES AND PERFLUTREN 3 ML: 10; .22 INJECTION, SOLUTION INTRAVENOUS at 16:27

## 2021-06-23 RX ADMIN — CARVEDILOL 3.12 MG: 6.25 TABLET, FILM COATED ORAL at 08:28

## 2021-06-23 RX ADMIN — ATORVASTATIN CALCIUM 20 MG: 20 TABLET, FILM COATED ORAL at 21:10

## 2021-06-23 RX ADMIN — SODIUM CHLORIDE 3 G: 900 INJECTION INTRAVENOUS at 23:40

## 2021-06-23 RX ADMIN — SODIUM CHLORIDE 3 G: 900 INJECTION INTRAVENOUS at 06:49

## 2021-06-23 RX ADMIN — CARVEDILOL 3.12 MG: 6.25 TABLET, FILM COATED ORAL at 17:41

## 2021-06-23 RX ADMIN — FUROSEMIDE 20 MG: 10 INJECTION, SOLUTION INTRAMUSCULAR; INTRAVENOUS at 06:49

## 2021-06-23 RX ADMIN — SODIUM CHLORIDE 3 G: 900 INJECTION INTRAVENOUS at 17:42

## 2021-06-23 ASSESSMENT — ENCOUNTER SYMPTOMS
NERVOUS/ANXIOUS: 0
FLANK PAIN: 0
DIZZINESS: 0
DIAPHORESIS: 0
BLOOD IN STOOL: 0
MEMORY LOSS: 1
SINUS PAIN: 0
TINGLING: 0
BACK PAIN: 0
WHEEZING: 0
WEAKNESS: 1
NAUSEA: 0
FOCAL WEAKNESS: 0
CHILLS: 0
MYALGIAS: 0
SHORTNESS OF BREATH: 0
BLURRED VISION: 0
SPEECH CHANGE: 0
DIARRHEA: 0
HEARTBURN: 0
SENSORY CHANGE: 0
CONSTIPATION: 1
PALPITATIONS: 0
NECK PAIN: 0
COUGH: 0
ABDOMINAL PAIN: 0
SORE THROAT: 0
FEVER: 0
VOMITING: 0
HEADACHES: 0

## 2021-06-23 ASSESSMENT — PAIN DESCRIPTION - PAIN TYPE
TYPE: ACUTE PAIN
TYPE: ACUTE PAIN

## 2021-06-23 ASSESSMENT — LIFESTYLE VARIABLES: SUBSTANCE_ABUSE: 0

## 2021-06-23 ASSESSMENT — FIBROSIS 4 INDEX: FIB4 SCORE: 3.61

## 2021-06-23 NOTE — CARE PLAN
The patient is Stable - Low risk of patient condition declining or worsening    Shift Goals  Clinical Goals: Remain free from falls    Progress made toward(s) clinical / shift goals:     Patient is not progressing towards the following goals:    Problem: Fall Risk  Goal: Patient will remain free from falls  Outcome: Progressing     Problem: Skin Integrity  Goal: Skin integrity is maintained or improved  Outcome: Progressing

## 2021-06-23 NOTE — CASE COMMUNICATION
Quality Review for 6.21.21 Transfer OASIS performed on by CINDY Amaral RN on 6.23, 2021:    Edits completed by CINDY Amaral RN:  1. Changed  C to na per POC

## 2021-06-23 NOTE — FACE TO FACE
Face to Face Note  -  Durable Medical Equipment    Hitesh Eli M.D. - NPI: [id number]  I certify that this patient is under my care and that they have had a durable medical equipment(DME)face to face encounter by myself that meets the physician DME face-to-face encounter requirements with this patient on:    Date of encounter:   Patient:                    MRN:                       YOB: 2021  Yoon Richards  [id number]  2/29/1928     The encounter with the patient was in whole, or in part, for the following medical condition, which is the primary reason for durable medical equipment:  CHF    I certify that, based on my findings, the following durable medical equipment is medically necessary:  3 in 1 Bedside [place] and Other DME Equipment - Shower chair, Tub transfer bench.    HOME O2 Saturation Measurements:(Values must be present for Home Oxygen orders)         ,     ,         My Clinical findings support the need for the above equipment due to:  Hypoxia    Supporting Symptoms: weakness     ------------------------------------------------------------------------------------------------------------------    Face to Face Supporting Documentation - Home Health    The encounter with this patient was in whole or in part the primary reason for home health admission.    Date of encounter:   Patient:                    MRN:                       YOB: 2021  Yoon Richards  [id number]  2/29/1928     Home health to see patient for:  Skilled Nursing care for assessment, interventions & education    Skilled need for:  Recent Deterioration of Health Status CHF    Skilled nursing interventions to include:  Comment: PT/OT    Homebound evidenced status by:  Needs the assistance of another person in order to leave the home. Leaving home must require a considerable and taxing effort. There must exist a normal inability to leave the home.    Community Physician to provide follow up  care: Radha Brady P.A.-C.     Optional Interventions    Wound information & treatment:    Home Infusion Therapy orders:    Line/Drain/Airway:    I certify the face to face encounter for this home care referral meets the CMS requirements and the encounter/clinical assessment with the patient was, in whole, or in part, for the medical condition(s) listed above, which is the primary reason for home health care. Based on my clinical findings: the service(s) are medically necessary, support the need for home health care, and the homebound criteria are met.  I certify that this patient has had a face to face encounter by myself.  Hitesh Eli M.D. - NPI: [id number]    *Debility, frailty and advanced age in the absence of an acute deterioration or exacerbation of a condition do not qualify a patient for home health.

## 2021-06-23 NOTE — PROGRESS NOTES
[facility] Medicine Daily Progress Note    Date of Service  6/23/2021    Chief Complaint  89 y.o. female admitted 6/20/2021 with proctitis.    Hospital Course  Admitted with sepsis possibly secondary to proctitis which was noted on CT scan of the abdomen and pelvis.  Was also consideration for possible pneumonia.  She was covered empirically with IV Unasyn.  Gastroenterology was consulted the case, patient underwent flexible sigmoidoscopy on 6/22/2021 which showed slightly edematous rectal mucosa without ulceration status post biopsy, 2 diminutive sigmoid polyp removed, Left-sided sigmoid colon biopsy, Solid stool in the rectal vault, removed.  She also has known history of paroxysmal atrial fibrillation, congestive heart failure, and appeared to be volume overloaded.  She was started on diuresis with IV Lasix.  She also has known history of urinary retention, Gomes catheter has been placed.     Interval Problem Update  Proctitis -denies rectal pain  Hypokalemia - 3.3  Resp failure - O2 2 lpm NC  HTN - sbp 110-140  Diabetes - -170  Afib - currently sinus  CHF - probnp was 22510    Updates given and plan of care discussed with patient's daughters who are at bedside.    Consultants/Specialty  Gastroenterology    Code Status  DNAR/DNI    Disposition  Home health    Review of Systems  Review of Systems   Constitutional: Positive for malaise/fatigue. Negative for chills, diaphoresis and fever.   HENT: Negative for congestion, hearing loss and sore throat.    Eyes: Negative for blurred vision.   Respiratory: Negative for cough, shortness of breath and wheezing.    Cardiovascular: Negative for chest pain, palpitations and leg swelling.   Gastrointestinal: Negative for abdominal pain, diarrhea, heartburn, nausea and vomiting.   Genitourinary: Negative for dysuria, flank pain and hematuria.   Musculoskeletal: Negative for back pain, joint pain, myalgias and neck pain.   Skin: Negative for rash.   Neurological: Positive  for weakness. Negative for dizziness, sensory change, speech change, focal weakness and headaches.   Psychiatric/Behavioral: The patient is not nervous/anxious.         Physical Exam  Temp:  [36.1 °C (97 °F)-36.7 °C (98 °F)] 36.1 °C (97 °F)  Pulse:  [70-92] 78  Resp:  [16-20] 20  BP: (110-144)/(55-67) 144/64  SpO2:  [90 %-100 %] 98 %    Physical Exam  Vitals and nursing note reviewed.   HENT:      Head: Normocephalic and atraumatic.      Nose: No congestion.      Mouth/Throat:      Mouth: Mucous membranes are moist.   Eyes:      Conjunctiva/sclera: Conjunctivae normal.      Pupils: Pupils are equal, round, and reactive to light.   Cardiovascular:      Rate and Rhythm: Normal rate and regular rhythm.   Pulmonary:      Effort: Pulmonary effort is normal.      Breath sounds: Rales present.   Abdominal:      General: There is distension.      Tenderness: There is no abdominal tenderness. There is no guarding or rebound.   Genitourinary:     Comments: Gomes in place   Musculoskeletal:         General: No swelling.      Cervical back: No tenderness.      Right knee: No tenderness.      Right lower leg: No edema.      Left lower leg: No edema.   Neurological:      General: No focal deficit present.      Mental Status: She is alert. She is disoriented.      Cranial Nerves: No cranial nerve deficit.   Psychiatric:         Speech: Speech is tangential.         Cognition and Memory: Cognition is impaired. Memory is impaired.         Fluids    Intake/Output Summary (Last 24 hours) at 6/23/2021 1239  Last data filed at 6/23/2021 1200  Gross per 24 hour   Intake 520 ml   Output 1350 ml   Net -830 ml       Laboratory  Recent Labs     06/21/21  0252 06/22/21  0750 06/23/21  0610   WBC 17.7* 15.6* 10.4   RBC 3.37* 3.16* 3.22*   HEMOGLOBIN 9.1* 8.6* 8.8*   HEMATOCRIT 29.6* 27.5* 28.4*   MCV 87.8 87.0 88.2   MCH 27.0 27.2 27.3   MCHC 30.7* 31.3* 31.0*   RDW 66.0* 66.1* 66.4*   PLATELETCT 160* 161* 155*   MPV 11.4 12.1 11.5     Recent  Labs     06/21/21  0252 06/22/21  0403 06/23/21  0459   SODIUM 130* 129* 130*   POTASSIUM 3.5* 3.2* 3.3*   CHLORIDE 100 98 102   CO2 21 22 19*   GLUCOSE 122* 145* 140*   BUN 18 28* 25*   CREATININE 0.64 0.71 0.61   CALCIUM 7.5* 7.3* 7.5*                   Imaging  CT-ABDOMEN-PELVIS WITH   Final Result      1.  Wall thickening of the rectum is noted and there is induration around the perirectal space. Findings could be due to proctitis.      2.  Small right pleural effusion is slightly larger than on prior exam.      3.  Consolidation and volume loss is again noted in each lung base.      4.  Cardiomegaly is again noted.         CT-HEAD W/O   Final Result         NO ACUTE ABNORMALITIES ARE NOTED ON CT SCAN OF THE HEAD.      Findings are consistent with atrophy.  Decreased attenuation in the periventricular white matter likely indicates microvascular ischemic disease.      DX-CHEST-PORTABLE (1 VIEW)   Final Result         Ill-defined opacifications in each lung have increased compared to the prior radiograph.  This could indicate worsening of pulmonary edema or inflammation.      EC-ECHOCARDIOGRAM COMPLETE W/O CONT    (Results Pending)   US-EXTREMITY VENOUS LOWER BILAT    (Results Pending)        Assessment/Plan  * Sepsis (HCC)- (present on admission)  Assessment & Plan  This is Sepsis Present on admission  SIRS criteria identified on my evaluation include: Fever, with temperature greater than 101 deg F, Tachypnea, with respirations greater than 20 per minute and Leukocytosis, with WBC greater than 12,000  Source - Pneumonia vs Proctitis  Fluid resuscitation ordered per protocol  IV antibiotics as appropriate for source of sepsis  While organ dysfunction may be noted elsewhere in this problem list or in the chart, degree of organ dysfunction does not meet CMS criteria for severe sepsis        Proctitis- (present on admission)  Assessment & Plan  IV Unasyn  Bowel protocol  Follow pathology results    Hypomagnesemia-  (present on admission)  Assessment & Plan  Recheck level    Hypokalemia- (present on admission)  Assessment & Plan  Increase K-Dur  Recheck potassium today, check magnesium and phosphorus    Thrombocytopenia (HCC)- (present on admission)  Assessment & Plan  Follow cbc    Normocytic anemia- (present on admission)  Assessment & Plan  Follow cbc    Chronic respiratory failure with hypoxia (HCC)- (present on admission)  Assessment & Plan  RT protocol    Chronic systolic heart failure (HCC)- (present on admission)  Assessment & Plan  Coreg, Losartan  IV Lasix to diurese  Watch fluid status    Urinary retention- (present on admission)  Assessment & Plan  Gomes in place  Follow-up with Urology as outpatient    Dementia ([facility])- (present on admission)  Assessment & Plan  Avoid benzodiazepines and anticholinergics  Frequent orientation  Avoid early morning labs  Avoid vital signs during sleep  Ambulate if possible    Hyponatremia- (present on admission)  Assessment & Plan  Follow bmp    Paroxysmal A-fib (HCC)- (present on admission)  Assessment & Plan  Coreg, Eliquis    Hypertension- (present on admission)  Assessment & Plan  Losartan, Coreg     Type 2 diabetes mellitus with hyperglycemia, without long-term current use of insulin ([facility])- (present on admission)  Assessment & Plan  SSI       VTE prophylaxis: Eliquis

## 2021-06-23 NOTE — PROGRESS NOTES
Received report from day shift RN. Assumed patient care  AOx2 - disoriented to situation and time  No complaints of pain at this time  2 L of O2 via nasal cannula  Gomes cathter placed due to urinary retention, pt tolerated well.  No complaints of nausea at this time, tolerating GI soft diet   LBM 6/22  High fall risk, bed alarm on  Call light within reach  Bed locked and in low position   POC discussed with patient  All needs met at this time.

## 2021-06-23 NOTE — CARE PLAN
The patient is Stable - Low risk of patient condition declining or worsening    Shift Goals  Clinical Goals: Remain free from falls    Progress made toward(s) clinical / shift goals:  q 2 turns, fall precautions in place, adequate urine output    Patient is not progressing towards the following goals:

## 2021-06-23 NOTE — PROGRESS NOTES
Gastroenterology Progress Note               Author:  EMELYN Baumann Date & Time Created: 6/23/2021 10:17 AM       Patient ID:  Name:             Yoon Richards    YOB: 1928  Age:                 89 y.o.  female  MRN:               [id number]      Referring Provider:  Aicha Vizcaino MD      Presenting Chief Complaint:  Abdominal pain, Abnormal CT of the pelvis    Interval History  6/22/2021: Flexible Sigmoidoscopy  Findings:    Digital rectal examination demonstrated solid stool in the rectal vault.  No mass palpable on rectal examination.  Scope inserted to 40 cm from anus.  Solid stool noted in the rectum as well.  No gross abnormal mucosa seen of the sigmoid colon.  Biopsies taken of the left side colon to ensure no microscopic inflammation.  Diminutive polyps x 2 (2mm and 2mm) removed with cold forcep biopsies.  Rectum mucosa appears slightly edematous but without mass lesion or obvious erythema.  Biopsies taken.  Retroflexion with small hemorrhoids.  No mass seen.  Please note extensive suctioning and digital fecal disimpaction was performed for evaluation of the rectum     Impressions:   1.  Slightly edematous rectal mucosa without ulceration status post biopsy  2.  2 diminutive sigmoid polyp removed  3.  Left-sided sigmoid colon biopsy  4.  Solid stool in the rectal vault, removed    6/23/2021: Stable. No bleeding or pain. Pathology pending. Tolerating diet.      History of Present Illness:    She is a 89-year-old female patient seen in consultation for abdominal pain and abnormal CT of the pelvis.  She was admitted yesterday after family noted that she had worsening confusion with altered level of consciousness, leg edema, and distended abdomen recently over the last few days.  Her family reported decrease in oral intake, increasing lethargy, sleeping most of the day, and not meeting her nutritional and hydration needs.  Upon evaluation she was found to have WBC 14.2, hemoglobin  9.7, hematocrit 30.5, MCV 86.4, left shift.  CMP significant for sodium 130, glucose 122, calcium 7.5, alkaline phosphatase 121, albumin 2.5, globulin 4.6, troponin stable at 61 and 66.  Urinalysis with dark yellow urine, small occult blood, 300 protein, trace leukocyte esterase, negative bacteria.  Covid negative.  CT scan of the abdomen and pelvis demonstrates wall thickening of the rectum with induration around the perirectal space, small right pleural effusion, consolidation and volume loss again noted in each lung base, and cardiomegaly.    The patient does have a history of coronary artery disease with previous CABG, chronic systolic and diastolic heart failure, dementia, diabetes, hyperlipidemia, hypertension.  Looking back at her records, and echo report brought from [facility] that was performed in February, was noted by the medical team in March at this facility to be reflective of ejection fraction 30 to 35%, moderate left ventricular hypertrophy, grade 3 diastolic dysfunction, areas of hypokinesis.  She does have atrial fibrillation history and is on Eliquis.    The patient's last colonoscopy and endoscopy was in 2010 per the patient's daughter.  Findings included possible gastritis and 2 polyps that were removed at that time.  Pathology is unknown.      Review of Systems:  Review of Systems   Constitutional: Positive for malaise/fatigue. Negative for chills.   HENT: Positive for hearing loss. Negative for sinus pain and sore throat.    Respiratory: Negative for cough and shortness of breath.    Cardiovascular: Positive for leg swelling. Negative for chest pain and palpitations.   Gastrointestinal: Positive for constipation. Negative for abdominal pain, blood in stool, diarrhea, heartburn, melena, nausea and vomiting.   Genitourinary: Positive for dysuria (Catheter).        Catheter in place   Neurological: Positive for weakness. Negative for dizziness, tingling, speech change and  headaches.   Psychiatric/Behavioral: Positive for memory loss. Negative for substance abuse.             Past Medical History:  Past Medical History:   Diagnosis Date   • CAD (coronary artery disease)    • Chronic systolic heart failure (HCC)    • Dementia (HCC)    • Diabetes (HCC)    • High cholesterol    • Hypertension      Active Hospital Problems    Diagnosis    • Acute respiratory failure with hypoxia (HCC) [J96.01]    • Normocytic anemia [D64.9]    • Thrombocytopenia (HCC) [D69.6]    • Hypokalemia [E87.6]    • Sepsis (HCC) [A41.9]    • Chronic combined systolic and diastolic congestive heart failure (HCC) [I50.42]    • Urinary retention [R33.9]    • Gait instability [R26.81]    • Hyponatremia [E87.1]    • Dementia (HCC) [F03.90]    • Paroxysmal A-fib (HCC) [I48.0]    • DM (diabetes mellitus) (HCC) [E11.9]    • Hypertension [I10]          Past Surgical History:  Past Surgical History:   Procedure Laterality Date   • PB SIGMOIDOSCOPY,DIAGNOSTIC N/A 6/22/2021    Procedure: SIGMOIDOSCOPY - FLEXIBLE WITH BIOSPY;  Surgeon: Jesus Manuel Bond M.D.;  Location: SURGERY SAME DAY [facility];  Service: Gastroenterology   • PB SIGMOIDOSCOPY,BIOPSY N/A 6/22/2021    Procedure: SIGMOIDOSCOPY, FLEXIBLE, WITH BIOPSY;  Surgeon: Jesus Manuel Bond M.D.;  Location: SURGERY SAME DAY [facility];  Service: Gastroenterology   • CHOLECYSTECTOMY     • CORONARY ARTERY BY[address] Medications:  Current Facility-Administered Medications   Medication Dose Frequency Provider Last Rate Last Admin   • potassium chloride SA (Kdur) tablet 20 mEq  20 mEq BID Hitesh Eli M.D.       • fleet enema 133 mL  1 Enema Once EMELYN Baumann       • furosemide (LASIX) injection 20 mg  20 mg Q DAY Aicha Vizcaino M.D.   20 mg at 06/23/21 0649   • ampicillin/sulbactam (UNASYN) 3 g in  mL IVPB  3 g Q6HR Maurice Fernandez M.D.   Stopped at 06/23/21 0719   • apixaban (ELIQUIS) tablet 2.5 mg  2.5 mg BID Maurice Fernandez M.D.   2.5 mg at 06/23/21  0649   • atorvastatin (LIPITOR) tablet 20 mg  20 mg Nightly Maurice Fernandez M.D.   20 mg at 21 2205   • carvedilol (COREG) tablet 3.125 mg  3.125 mg BID WITH MEALS Maurice Fernandez M.D.   3.125 mg at 21 0828   • losartan (COZAAR) tablet 25 mg  25 mg DAILY Maurice Fernandez M.D.   25 mg at 21 1552   • polyethylene glycol/lytes (MIRALAX) PACKET 1 Packet  1 Packet BID PRN Maurice Fernandez M.D.       • insulin lispro (AdmeLOG) injection  1-6 Units 4X/DAY ACHS Maurice Fernandez M.D.   1 Units at 21    And   • glucose 4 g chewable tablet 16 g  16 g Q15 MIN PRN Maurice Fernandez M.D.        And   • dextrose 50% (D50W) injection 50 mL  50 mL Q15 MIN PRN Maurice Fernandez M.D.       • Pharmacy Consult Request ...Pain Management Review 1 Each  1 Each PHARMACY TO DOSE Ashok Simms M.D.       • oxyCODONE immediate-release (ROXICODONE) tablet 2.5 mg  2.5 mg Q3HRS PRN Ashok Simms M.D.   2.5 mg at 21 0634    Or   • oxyCODONE immediate-release (ROXICODONE) tablet 5 mg  5 mg Q3HRS PRJARET Simms M.D.   5 mg at 21 1643    Or   • morphine (pf) 4 mg/mL injection 2 mg  2 mg Q3HRS PRN Ashok Simms M.D.       Last reviewed on 2021 11:57 AM by Christine Echeverria        Current Outpatient Medications:  Medications Prior to Admission   Medication Sig Dispense Refill Last Dose   • loratadine (CLARITIN) 10 MG Tab Take 10 mg by mouth 1 time a day as needed (for itching).   2021 at Unknown time   • cefdinir (OMNICEF) 300 MG Cap Take 300 mg by mouth 2 times a day. For 7 days   2021 at 2130   • [] HYDROcodone-acetaminophen (NORCO) 5-325 MG Tab per tablet Take 0.5-1 Tablets by mouth every 6 hours as needed for up to 4 days. 12 tablet 0 unknown at Unknown time   • oxybutynin (DITROPAN) 5 MG Tab Take 1 tablet by mouth 2 times a day. 90 tablet 0 STOPPED at 6/15/21   • linagliptin (TRADJENTA) 5 MG Tab tablet Take 1 tablet by mouth every day. Indications: Type 2 Diabetes 30 tablet 5 2021  at am   • atorvastatin (LIPITOR) 20 MG Tab Take 20 mg by mouth every evening.   6/19/2021 at 2130   • Calcium Carb-Cholecalciferol (OYSTER SHELL CALCIUM/VITAMIN D) 250-125 MG-UNIT Tab tablet Take 1 tablet by mouth every day.   6/19/2021 at 1200   • furosemide (LASIX) 20 MG Tab Take 1 tablet by mouth every Mon, Wed, Fri, Sat, and Sun at 6 PM. Saturday & Sunday as needed (Patient taking differently: Take 20 mg by mouth every Monday, Wednesday, and Friday.) 90 tablet 3 6/18/2021 at Unknown time   • polyethylene glycol/lytes (MIRALAX) 17 g Pack Take 17 g by mouth 2 times a day as needed (constipation). Indications: Constipation   6/19/2021 at Unknown time   • vitamin D (CHOLECALCIFEROL) 1000 Unit (25 mcg) Tab Take 1,000 Units by mouth every day.   6/19/2021 at 1200   • carvedilol (COREG) 3.125 MG Tab Take 1 tablet by mouth 2 times a day with meals. 60 tablet 3 6/19/2021 at 2130   • losartan (COZAAR) 25 MG Tab Take 1 tablet by mouth every day. 30 tablet 11 6/19/2021 at am   • apixaban (ELIQUIS) 2.5mg Tab Take 1 tablet by mouth 2 times a day. 60 tablet 11 6/19/2021 at 2130   • acetaminophen (TYLENOL) 650 MG CR tablet Take 650 mg by mouth every 6 hours as needed for Moderate Pain. Indications: Fever, Pain   unknown at Unknown time   • Home Care Oxygen Inhale 2 L/min continuous. Oxygen dose range: 1 L/min  Respiratory route via: Nasal Cannula   Oxygen supplier: [facility]       unknown at Unknown time   • thiamine (VITAMIN B-1) 100 MG Tab Take 100 mg by mouth every day.   6/19/2021 at 1200   • Iron-Vit C-Vit B12-Folic Acid (IRON 100 PLUS PO) Take 1 tablet by mouth every day.   6/19/2021 at 1200         Medication Allergies:  Allergies   Allergen Reactions   • Ciprofloxacin Itching   • Donepezil Vomiting   • Hydrochlorothiazide Itching   • Sulfa Drugs Itching   • Trimethoprim Itching         Family Medical History:  History reviewed. No pertinent family history.      Social History:  Social History     Socioeconomic History  "  • Marital status:      Spouse name: Not on file   • Number of children: Not on file   • Years of education: Not on file   • Highest education level: Not on file   Occupational History   • Not on file   Tobacco Use   • Smoking status: Former Smoker   • Smokeless tobacco: Never Used   Vaping Use   • Vaping Use: Never used   Substance and Sexual Activity   • Alcohol use: Never   • Drug use: Never   • Sexual activity: Not Currently   Other Topics Concern   • Not on file   Social History Narrative   • Not on file     Social Determinants of Health     Financial Resource Strain: Low Risk    • Difficulty of Paying Living Expenses: Not hard at all   Food Insecurity: No Food Insecurity   • Worried About Running Out of Food in the Last Year: Never true   • Ran Out of Food in the Last Year: Never true   Transportation Needs: No Transportation Needs   • Lack of Transportation (Medical): No   • Lack of Transportation (Non-Medical): No   Physical Activity:    • Days of Exercise per Week:    • Minutes of Exercise per Session:    Stress:    • Feeling of Stress :    Social Connections:    • Frequency of Communication with Friends and Family:    • Frequency of Social Gatherings with Friends and Family:    • Attends [employer] Services:    • Active Member of Clubs or Organizations:    • Attends Club or Organization Meetings:    • Marital Status:    Intimate Partner Violence:    • Fear of Current or Ex-Partner:    • Emotionally Abused:    • Physically Abused:    • Sexually Abused:          Vital signs:  Weight/BMI: Body mass index is 29.02 kg/m².  /64   Pulse 78   Temp 36.1 °C (97 °F) (Temporal)   Resp 20   Ht 1.6 m (5' 3\")   Wt 74.3 kg (163 lb 12.8 oz)   SpO2 98%   Vitals:    06/22/21 2359 06/23/21 0334 06/23/21 0600 06/23/21 0711   BP: 125/61 128/64  144/64   Pulse: 83 70  78   Resp: 18 18  20   Temp: 36.2 °C (97.1 °F) 36.7 °C (98 °F)  36.1 °C (97 °F)   TempSrc: Temporal Temporal  Temporal   SpO2: 93% 96%  98% "   Weight:   74.3 kg (163 lb 12.8 oz)    Height:         Oxygen Therapy:  Pulse Oximetry: 98 %, O2 (LPM): 2, O2 Delivery Device: Silicone Nasal Cannula    Intake/Output Summary (Last 24 hours) at 6/23/2021 1017  Last data filed at 6/23/2021 0711  Gross per 24 hour   Intake 420 ml   Output 1600 ml   Net -1180 ml         Physical Exam:  Physical Exam  Vitals and nursing note reviewed.   Constitutional:       Appearance: Normal appearance.   HENT:      Head: Normocephalic and atraumatic.      Right Ear: External ear normal.      Left Ear: External ear normal.      Nose: Nose normal.      Mouth/Throat:      Mouth: Mucous membranes are dry.      Pharynx: Oropharynx is clear.   Eyes:      General: No scleral icterus.     Pupils: Pupils are equal, round, and reactive to light.   Cardiovascular:      Rate and Rhythm: Normal rate. Rhythm irregular.      Pulses: Normal pulses.      Heart sounds: Normal heart sounds.   Pulmonary:      Effort: Pulmonary effort is normal.      Breath sounds: Rales (LLL) present.   Abdominal:      General: Bowel sounds are normal. There is distension.      Palpations: Abdomen is soft.      Hernia: A hernia (Umbilical, reducible without pain) is present.   Musculoskeletal:      Cervical back: Neck supple.      Right lower leg: No edema.      Left lower leg: No edema.   Skin:     General: Skin is warm and dry.      Coloration: Skin is pale.   Neurological:      Mental Status: She is alert. Mental status is at baseline. She is disoriented.   Psychiatric:         Mood and Affect: Mood normal.         Behavior: Behavior normal.             Labs:  Recent Labs     06/21/21  0252 06/22/21  0403 06/23/21  0459   SODIUM 130* 129* 130*   POTASSIUM 3.5* 3.2* 3.3*   CHLORIDE 100 98 102   CO2 21 22 19*   BUN 18 28* 25*   CREATININE 0.64 0.71 0.61   MAGNESIUM  --  1.7  --    CALCIUM 7.5* 7.3* 7.5*     Recent Labs     06/21/21  0252 06/22/21  0403 06/22/21  1811 06/23/21  0459   ALTSGPT 16  --   --   --     ASTSGOT 25  --   --   --    ALKPHOSPHAT 121*  --   --   --    TBILIRUBIN 0.9  --   --   --    GAMMAGT  --   --  22  --    GLUCOSE 122* 145*  --  140*     Recent Labs     06/21/21  0252 06/22/21 0750 06/23/21  0610   WBC 17.7* 15.6* 10.4   NEUTSPOLYS  --  90.70*  --    LYMPHOCYTES  --  2.20*  --    MONOCYTES  --  6.10  --    EOSINOPHILS  --  0.20  --    BASOPHILS  --  0.20  --    ASTSGOT 25  --   --    ALTSGPT 16  --   --    ALKPHOSPHAT 121*  --   --    TBILIRUBIN 0.9  --   --      Recent Labs     06/20/21  1045 06/21/21 0252 06/22/21 0750 06/23/21  0610   RBC  --  3.37* 3.16* 3.22*   HEMOGLOBIN  --  9.1* 8.6* 8.8*   HEMATOCRIT  --  29.6* 27.5* 28.4*   PLATELETCT  --  160* 161* 155*   PROTHROMBTM 19.2*  --   --   --    APTT 38.3*  --   --   --    INR 1.68*  --   --   --      Recent Results (from the past 24 hour(s))   POCT glucose device results    Collection Time: 06/22/21 11:56 AM   Result Value Ref Range    Glucose - Accu-Ck 130 (H) 65 - 99 mg/dL   POCT glucose device results    Collection Time: 06/22/21 12:53 PM   Result Value Ref Range    Glucose - Accu-Ck 146 (H) 65 - 99 mg/dL   Histology Request    Collection Time: 06/22/21  3:00 PM   Result Value Ref Range    Pathology Request Sent to Histo    POCT glucose device results    Collection Time: 06/22/21  5:42 PM   Result Value Ref Range    Glucose - Accu-Ck 176 (H) 65 - 99 mg/dL   GAMMA GT (GGT)    Collection Time: 06/22/21  6:11 PM   Result Value Ref Range    Gamma Gt 22 7 - 34 U/L   POCT glucose device results    Collection Time: 06/22/21 10:04 PM   Result Value Ref Range    Glucose - Accu-Ck 155 (H) 65 - 99 mg/dL   Basic Metabolic Panel    Collection Time: 06/23/21  4:59 AM   Result Value Ref Range    Sodium 130 (L) 135 - 145 mmol/L    Potassium 3.3 (L) 3.6 - 5.5 mmol/L    Chloride 102 96 - 112 mmol/L    Co2 19 (L) 20 - 33 mmol/L    Glucose 140 (H) 65 - 99 mg/dL    Bun 25 (H) 8 - 22 mg/dL    Creatinine 0.61 0.50 - 1.40 mg/dL    Calcium 7.5 (L) 8.5 - 10.5  mg/dL    Anion Gap 9.0 7.0 - 16.0   ESTIMATED GFR    Collection Time: 06/23/21  4:59 AM   Result Value Ref Range    GFR If African American >60 >60 mL/min/1.73 m 2    GFR If Non African American >60 >60 mL/min/1.73 m 2   CBC WITHOUT DIFFERENTIAL    Collection Time: 06/23/21  6:10 AM   Result Value Ref Range    WBC 10.4 4.8 - 10.8 K/uL    RBC 3.22 (L) 4.20 - 5.40 M/uL    Hemoglobin 8.8 (L) 12.0 - 16.0 g/dL    Hematocrit 28.4 (L) 37.0 - 47.0 %    MCV 88.2 81.4 - 97.8 fL    MCH 27.3 27.0 - 33.0 pg    MCHC 31.0 (L) 33.6 - 35.0 g/dL    RDW 66.4 (H) 35.9 - 50.0 fL    Platelet Count 155 (L) 164 - 446 K/uL    MPV 11.5 9.0 - 12.9 fL   POCT glucose device results    Collection Time: 06/23/21  8:26 AM   Result Value Ref Range    Glucose - Accu-Ck 140 (H) 65 - 99 mg/dL         Radiology Review:  CT-ABDOMEN-PELVIS WITH   Final Result      1.  Wall thickening of the rectum is noted and there is induration around the perirectal space. Findings could be due to proctitis.      2.  Small right pleural effusion is slightly larger than on prior exam.      3.  Consolidation and volume loss is again noted in each lung base.      4.  Cardiomegaly is again noted.         CT-HEAD W/O   Final Result         NO ACUTE ABNORMALITIES ARE NOTED ON CT SCAN OF THE HEAD.      Findings are consistent with atrophy.  Decreased attenuation in the periventricular white matter likely indicates microvascular ischemic disease.      DX-CHEST-PORTABLE (1 VIEW)   Final Result         Ill-defined opacifications in each lung have increased compared to the prior radiograph.  This could indicate worsening of pulmonary edema or inflammation.      EC-ECHOCARDIOGRAM COMPLETE W/O CONT    (Results Pending)   US-EXTREMITY VENOUS LOWER BILAT    (Results Pending)         MDM (Data Review):   -Records reviewed and summarized in current documentation  -I personally reviewed and interpreted the laboratory results  -I personally reviewed the radiology images      Medical  Decision Making, by Problem:  Active Hospital Problems    Diagnosis    • Acute respiratory failure with hypoxia (HCC) [J96.01]    • Normocytic anemia [D64.9]    • Thrombocytopenia (HCC) [D69.6]    • Hypokalemia [E87.6]    • Sepsis (HCC) [A41.9]    • Chronic combined systolic and diastolic congestive heart failure (HCC) [I50.42]    • Urinary retention [R33.9]    • Gait instability [R26.81]    • Hyponatremia [E87.1]    • Dementia (HCC) [F03.90]    • Paroxysmal A-fib (HCC) [I48.0]    • DM (diabetes mellitus) (HCC) [E11.9]    • Hypertension [I10]            Assessment/Recommendations:  Assessment:  1.  Abnormal CT of the rectum with thickening, possible proctitis.  Mild erythema of the rectum biopsied, but no other abnormalities    2.  Sepsis-etiology is unclear. Proctitis unlikely source of sepsis.     3.  Chronic systolic and diastolic congestive heart failure ejection fraction 30 to 35% on last echocardiogram reportedly from February of this year.    4.  Dementia    5.  Paroxysmal atrial fibrillation with chronic anticoagulation on Eliquis    6.  Urinary retention with catheter    7.  Coronary artery disease    8.  Diabetes mellitus type 2    9.  Hypertension    Plan:  1.  Follow up biopsies. Patient will be sent a letter by Dr. Bond in 1 week or so.  2. No outpatient GI follow up needed if biopsies are benign. If patient still having abdominal pain, can consider follow up with Dr. Bond or YE in 8-10 weeks.  3. Can consider Hydrocortisone suppositories x 10 days for proctitis    GI will sign off. Please call for any questions or concerns    Thank you very much for allowing me to participate in the care of your patient.  Please feel free to contact me anytime at [phone].     ADELE Baumann.    Core Quality Measures   Reviewed items::  Labs, Medications and Radiology reports reviewed

## 2021-06-24 ENCOUNTER — TELEPHONE (OUTPATIENT)
Dept: CARDIOLOGY | Facility: MEDICAL CENTER | Age: 86
End: 2021-06-24

## 2021-06-24 PROBLEM — I27.20 PULMONARY HTN (HCC): Status: ACTIVE | Noted: 2021-06-24

## 2021-06-24 PROBLEM — E83.39 HYPOPHOSPHATEMIA: Status: ACTIVE | Noted: 2021-06-24

## 2021-06-24 PROBLEM — I07.1 TRICUSPID REGURGITATION: Status: ACTIVE | Noted: 2021-06-24

## 2021-06-24 LAB
25(OH)D3 SERPL-MCNC: 34 NG/ML (ref 30–100)
ALP BONE SERPL-CCNC: 18 U/L (ref 0–55)
ALP ISOS SERPL HS-CCNC: 0 U/L
ALP LIVER SERPL-CCNC: 114 U/L (ref 0–94)
ALP SERPL-CCNC: 132 U/L (ref 40–120)
ANION GAP SERPL CALC-SCNC: 7 MMOL/L (ref 7–16)
BACTERIA STL CULT: NORMAL
BASOPHILS # BLD AUTO: 0.4 % (ref 0–1.8)
BASOPHILS # BLD: 0.03 K/UL (ref 0–0.12)
BUN SERPL-MCNC: 16 MG/DL (ref 8–22)
C JEJUNI+C COLI AG STL QL: NORMAL
CALCIUM SERPL-MCNC: 7.8 MG/DL (ref 8.5–10.5)
CALPROTECTIN STL-MCNT: 137 UG/G
CHLORIDE SERPL-SCNC: 100 MMOL/L (ref 96–112)
CO2 SERPL-SCNC: 25 MMOL/L (ref 20–33)
COMMENT 1642: NORMAL
CREAT SERPL-MCNC: 0.64 MG/DL (ref 0.5–1.4)
E COLI SXT1+2 STL IA: NORMAL
EOSINOPHIL # BLD AUTO: 0.1 K/UL (ref 0–0.51)
EOSINOPHIL NFR BLD: 1.5 % (ref 0–6.9)
ERYTHROCYTE [DISTWIDTH] IN BLOOD BY AUTOMATED COUNT: 64.3 FL (ref 35.9–50)
GLUCOSE BLD-MCNC: 136 MG/DL (ref 65–99)
GLUCOSE BLD-MCNC: 148 MG/DL (ref 65–99)
GLUCOSE BLD-MCNC: 175 MG/DL (ref 65–99)
GLUCOSE BLD-MCNC: 204 MG/DL (ref 65–99)
GLUCOSE SERPL-MCNC: 148 MG/DL (ref 65–99)
HCT VFR BLD AUTO: 30.4 % (ref 37–47)
HGB BLD-MCNC: 9.5 G/DL (ref 12–16)
IMM GRANULOCYTES # BLD AUTO: 0.04 K/UL (ref 0–0.11)
IMM GRANULOCYTES NFR BLD AUTO: 0.6 % (ref 0–0.9)
LYMPHOCYTES # BLD AUTO: 0.42 K/UL (ref 1–4.8)
LYMPHOCYTES NFR BLD: 6.2 % (ref 22–41)
MAGNESIUM SERPL-MCNC: 2 MG/DL (ref 1.5–2.5)
MCH RBC QN AUTO: 26.8 PG (ref 27–33)
MCHC RBC AUTO-ENTMCNC: 31.3 G/DL (ref 33.6–35)
MCV RBC AUTO: 85.6 FL (ref 81.4–97.8)
MONOCYTES # BLD AUTO: 0.73 K/UL (ref 0–0.85)
MONOCYTES NFR BLD AUTO: 10.8 % (ref 0–13.4)
MORPHOLOGY BLD-IMP: NORMAL
NEUTROPHILS # BLD AUTO: 5.44 K/UL (ref 2–7.15)
NEUTROPHILS NFR BLD: 80.5 % (ref 44–72)
NRBC # BLD AUTO: 0 K/UL
NRBC BLD-RTO: 0 /100 WBC
NT-PROBNP SERPL IA-MCNC: ABNORMAL PG/ML (ref 0–125)
PHOSPHATE SERPL-MCNC: 1.6 MG/DL (ref 2.5–4.5)
PLATELET # BLD AUTO: 118 K/UL (ref 164–446)
PMV BLD AUTO: 10.6 FL (ref 9–12.9)
POTASSIUM SERPL-SCNC: 3.4 MMOL/L (ref 3.6–5.5)
RBC # BLD AUTO: 3.55 M/UL (ref 4.2–5.4)
SIGNIFICANT IND 70042: NORMAL
SITE SITE: NORMAL
SODIUM SERPL-SCNC: 132 MMOL/L (ref 135–145)
SOURCE SOURCE: NORMAL
TSH SERPL DL<=0.005 MIU/L-ACNC: 4.92 UIU/ML (ref 0.38–5.33)
VIT B12 SERPL-MCNC: 1643 PG/ML (ref 211–911)
WBC # BLD AUTO: 6.8 K/UL (ref 4.8–10.8)

## 2021-06-24 PROCEDURE — 700111 HCHG RX REV CODE 636 W/ 250 OVERRIDE (IP): Performed by: FAMILY MEDICINE

## 2021-06-24 PROCEDURE — 700111 HCHG RX REV CODE 636 W/ 250 OVERRIDE (IP): Performed by: HOSPITALIST

## 2021-06-24 PROCEDURE — 700102 HCHG RX REV CODE 250 W/ 637 OVERRIDE(OP): Performed by: HOSPITALIST

## 2021-06-24 PROCEDURE — 84100 ASSAY OF PHOSPHORUS: CPT

## 2021-06-24 PROCEDURE — 94760 N-INVAS EAR/PLS OXIMETRY 1: CPT

## 2021-06-24 PROCEDURE — 82962 GLUCOSE BLOOD TEST: CPT

## 2021-06-24 PROCEDURE — A9270 NON-COVERED ITEM OR SERVICE: HCPCS | Performed by: HOSPITALIST

## 2021-06-24 PROCEDURE — 83735 ASSAY OF MAGNESIUM: CPT

## 2021-06-24 PROCEDURE — 82306 VITAMIN D 25 HYDROXY: CPT

## 2021-06-24 PROCEDURE — 700111 HCHG RX REV CODE 636 W/ 250 OVERRIDE (IP): Performed by: INTERNAL MEDICINE

## 2021-06-24 PROCEDURE — 700102 HCHG RX REV CODE 250 W/ 637 OVERRIDE(OP): Performed by: FAMILY MEDICINE

## 2021-06-24 PROCEDURE — 82607 VITAMIN B-12: CPT

## 2021-06-24 PROCEDURE — A9270 NON-COVERED ITEM OR SERVICE: HCPCS | Performed by: FAMILY MEDICINE

## 2021-06-24 PROCEDURE — 770006 HCHG ROOM/CARE - MED/SURG/GYN SEMI*

## 2021-06-24 PROCEDURE — 83880 ASSAY OF NATRIURETIC PEPTIDE: CPT

## 2021-06-24 PROCEDURE — 99233 SBSQ HOSP IP/OBS HIGH 50: CPT | Performed by: FAMILY MEDICINE

## 2021-06-24 PROCEDURE — 700105 HCHG RX REV CODE 258: Performed by: HOSPITALIST

## 2021-06-24 PROCEDURE — 85025 COMPLETE CBC W/AUTO DIFF WBC: CPT

## 2021-06-24 PROCEDURE — 84443 ASSAY THYROID STIM HORMONE: CPT

## 2021-06-24 PROCEDURE — 80048 BASIC METABOLIC PNL TOTAL CA: CPT

## 2021-06-24 PROCEDURE — 36415 COLL VENOUS BLD VENIPUNCTURE: CPT

## 2021-06-24 RX ORDER — METRONIDAZOLE 500 MG/1
500 TABLET ORAL EVERY 8 HOURS
Status: COMPLETED | OUTPATIENT
Start: 2021-06-25 | End: 2021-06-27

## 2021-06-24 RX ORDER — CEFDINIR 300 MG/1
300 CAPSULE ORAL EVERY 12 HOURS
Status: COMPLETED | OUTPATIENT
Start: 2021-06-25 | End: 2021-06-27

## 2021-06-24 RX ORDER — FUROSEMIDE 10 MG/ML
40 INJECTION INTRAMUSCULAR; INTRAVENOUS
Status: DISCONTINUED | OUTPATIENT
Start: 2021-06-24 | End: 2021-06-29 | Stop reason: HOSPADM

## 2021-06-24 RX ADMIN — DIBASIC SODIUM PHOSPHATE, MONOBASIC POTASSIUM PHOSPHATE AND MONOBASIC SODIUM PHOSPHATE 250 MG: 852; 155; 130 TABLET ORAL at 17:20

## 2021-06-24 RX ADMIN — SODIUM CHLORIDE 3 G: 900 INJECTION INTRAVENOUS at 04:43

## 2021-06-24 RX ADMIN — CARVEDILOL 3.12 MG: 6.25 TABLET, FILM COATED ORAL at 07:43

## 2021-06-24 RX ADMIN — APIXABAN 2.5 MG: 5 TABLET, FILM COATED ORAL at 04:43

## 2021-06-24 RX ADMIN — LOSARTAN POTASSIUM 25 MG: 50 TABLET, FILM COATED ORAL at 04:43

## 2021-06-24 RX ADMIN — ATORVASTATIN CALCIUM 20 MG: 20 TABLET, FILM COATED ORAL at 21:56

## 2021-06-24 RX ADMIN — INSULIN LISPRO 1 UNITS: 100 INJECTION, SOLUTION INTRAVENOUS; SUBCUTANEOUS at 22:01

## 2021-06-24 RX ADMIN — APIXABAN 2.5 MG: 5 TABLET, FILM COATED ORAL at 17:21

## 2021-06-24 RX ADMIN — CARVEDILOL 3.12 MG: 6.25 TABLET, FILM COATED ORAL at 17:21

## 2021-06-24 RX ADMIN — POTASSIUM CHLORIDE 20 MEQ: 1500 TABLET, EXTENDED RELEASE ORAL at 04:43

## 2021-06-24 RX ADMIN — DIBASIC SODIUM PHOSPHATE, MONOBASIC POTASSIUM PHOSPHATE AND MONOBASIC SODIUM PHOSPHATE 250 MG: 852; 155; 130 TABLET ORAL at 11:57

## 2021-06-24 RX ADMIN — FUROSEMIDE 40 MG: 10 INJECTION, SOLUTION INTRAMUSCULAR; INTRAVENOUS at 17:20

## 2021-06-24 RX ADMIN — POTASSIUM CHLORIDE 20 MEQ: 1500 TABLET, EXTENDED RELEASE ORAL at 17:21

## 2021-06-24 RX ADMIN — INSULIN LISPRO 2 UNITS: 100 INJECTION, SOLUTION INTRAVENOUS; SUBCUTANEOUS at 17:34

## 2021-06-24 RX ADMIN — FUROSEMIDE 20 MG: 10 INJECTION, SOLUTION INTRAMUSCULAR; INTRAVENOUS at 04:43

## 2021-06-24 RX ADMIN — SODIUM CHLORIDE 3 G: 900 INJECTION INTRAVENOUS at 11:05

## 2021-06-24 ASSESSMENT — ENCOUNTER SYMPTOMS
SORE THROAT: 0
DIAPHORESIS: 0
MYALGIAS: 0
DIZZINESS: 0
COUGH: 0
PALPITATIONS: 0
WHEEZING: 0
NERVOUS/ANXIOUS: 0
DIARRHEA: 0
BACK PAIN: 0
WEAKNESS: 1
SHORTNESS OF BREATH: 0
SENSORY CHANGE: 0
FLANK PAIN: 0
ABDOMINAL PAIN: 0
HEARTBURN: 0
BLURRED VISION: 0
SPEECH CHANGE: 0
NAUSEA: 0
HEADACHES: 0
FOCAL WEAKNESS: 0
FEVER: 0
NECK PAIN: 0
VOMITING: 0
CHILLS: 0

## 2021-06-24 ASSESSMENT — PAIN DESCRIPTION - PAIN TYPE
TYPE: ACUTE PAIN
TYPE: ACUTE PAIN

## 2021-06-24 NOTE — DISCHARGE PLANNING
Received Choice form at 0956   Agency/Facility Name: [facility]   Referral sent per Choice form @ [phone] -[phone]  Agency/Facility Name: [facility]   Outcome: Per Epic response pt is accepted.

## 2021-06-24 NOTE — DISCHARGE PLANNING
Anticipated Discharge Disposition: Home with home health     Action: RN CM spoke with patient's daughter and BELEM Hamilton about discharge planning. RN CM spoke with patient about SNF as recommended by PT and OT. Per Joy she does not want her mother going to any skilled nursing facility.     The patient lives with Joy. The patient has a caregiver MTW that is with her all day. The patient has her son with her for the remainder of the week.     The patient is also established with [facility]. HH Choice received to resume care with: [facility].     The patient is on 2 L of oxygen at home. The patient's current oxygen company is no longer contracted with her insurance and they are paying out of pocket. Per Joy the only company that is contracted with her insurance is [employer]. Joy is requesting assistance switching to [employer].     RN CM discussed bedside commode, transfer bench, and shower chair. Discussed with Joy that insurance often does not cover shower chairs. DME Choice for: [employer]. Choice forms faxed to [facility].     MD updated and oxygen order requested.     Barriers to Discharge: medical clearance, DME acceptance and delivery    Plan: Follow up with medical team.     Addendum:  Patient discussed in IDT rounds. Per MD an oxygen order will be placed closer to discharge when oxygen level needs are known.

## 2021-06-24 NOTE — CARE PLAN
The patient is Stable - Low risk of patient condition declining or worsening    Shift Goals  Clinical Goals: remain free from falls    Progress made toward(s) clinical / shift goals:  safety precautions in place, adequate urine out     Patient is not progressing towards the following goals:

## 2021-06-24 NOTE — TELEPHONE ENCOUNTER
RO    Patient daughter called to advise the Pt had an echo in the hospital, they are wanting to know if the Pt needs to proceed with the echo in July or if it would be safe to cancel. Please do not cancel the Pt appt as they will call to cancel. They can be reached at [phone].    Thank you,  Priscila NOBLES

## 2021-06-24 NOTE — CARE PLAN
The patient is Stable - Low risk of patient condition declining or worsening    Shift Goals  Clinical Goals: remain free from falls    Progress made toward(s) clinical / shift goals:     Patient is not progressing towards the following goals:    Problem: Fall Risk  Goal: Patient will remain free from falls  Outcome: Progressing     Problem: Knowledge Deficit - Standard  Goal: Patient and family/care givers will demonstrate understanding of plan of care, disease process/condition, diagnostic tests and medications  Outcome: Progressing

## 2021-06-24 NOTE — PROGRESS NOTES
Received report from day shift RN. Assumed patient care  AOx2 - disoriented to situation and time  No complaints of pain at this time  2 L of O2 via nasal cannula  Gomes cathter in place due to urinary retention  No complaints of nausea at this time, tolerating GI soft diet   LBM 6/23 - inc  High fall risk, bed alarm on  Call light within reach  Bed locked and in low position   POC discussed with patient  All needs met at this time.

## 2021-06-24 NOTE — DOCUMENTATION QUERY
[facility]                                                                       Query Response Note      PATIENT:               ERICA ORNELAS  ACCT #:                  [id number]  MRN:                     [id number]  :                      [id number]  ADMIT DATE:       2021 9:45 AM  DISCH DATE:          RESPONDING  PROVIDER #:        [id number]           QUERY TEXT:    Chronic systolic heart failure and Chronic combined systolic heart failure are documented in the H&P, consult and progress notes. Considering the clinical indicators, please clarify the type and acuity of congestive heart failure for this admission.    NOTE:  If an appropriate response is not listed below, please respond with a new note.      The patient's Clinical Indicators include:  NOTED:    H&P - Chronic combined systolic and diastolic congestive heart failure - Heart failure does not appear exacerbated at this juncture, patient appears clinically dry and will be given IV fluids in accordance with sepsis protocol  PN - Chronic systolic heart failure  PN- Chronic systolic and diastolic heart failure  PN - -- JVD present  - Echo - EF -30%; Grade III diastolic dysfunction (restrictive pattern)  - CXR Impression - Ill-defined opacifications in each lung have increased compared to prior radiograph.  this could indicate worsening of pulmonary edema or inflammation.  NT-proBNP [id number] - [id number]  Lasix IV Daily/Coreg-Losartan  Hypertension  Atrial Fibrillation  Sepsis    Thank you,  Elizabeth Pinon RN, CCS  Clinical Documentation Integrity  Connect via [email or web address]  Options provided:   -- Chronic Systolic heart failure   -- Acute on Chronic Systolic heart failure   -- Chronic Combined Systolic and Diastolic heart failure   -- Acute on Chronic  Combined Systolic and Diastolic heart failure   -- Unable to determine      Query created by: Elizabeth Pinon on 2021  10:54 AM    RESPONSE TEXT:    Chronic Systolic heart failure          Electronically signed by:  MARTINEZ TAYLOR MD 6/24/2021 12:18 PM

## 2021-06-24 NOTE — PROGRESS NOTES
Hospital Medicine Daily Progress Note    Date of Service  6/24/2021    Chief Complaint  89 y.o. female admitted 6/20/2021 with proctitis.    Hospital Course  Admitted with sepsis possibly secondary to proctitis which was noted on CT scan of the abdomen and pelvis. There was also consideration for possible pneumonia.  She was covered empirically with IV Unasyn.  Gastroenterology was consulted on the case, patient underwent flexible sigmoidoscopy on 6/22/2021 which showed slightly edematous rectal mucosa without ulceration status post biopsy, 2 diminutive sigmoid polyp removed, Left-sided sigmoid colon biopsy, Solid stool in the rectal vault, removed.  She also has known history of paroxysmal atrial fibrillation, congestive heart failure, and appeared to be volume overloaded.  She was started on diuresis with IV Lasix. She also has known history of urinary retention, and Gomes catheter has been placed.      Interval Problem Update  Proctitis - denies rectal pain, pathology consistent with proctitis  Resp failure - O2 2 lpm NC  HTN - sbp 140s  Diabetes - -230  Afib - currently sinus  CHF - EF 30%  Low potassium and phosphorus    Updates given and plan of care discussed with patient's daughter who was at bedside.    Consultants/Specialty  Gastroenterology    Code Status  DNAR/DNI    Disposition  Home health    Review of Systems  Review of Systems   Constitutional: Positive for malaise/fatigue. Negative for chills, diaphoresis and fever.   HENT: Negative for congestion, hearing loss and sore throat.    Eyes: Negative for blurred vision.   Respiratory: Negative for cough, shortness of breath and wheezing.    Cardiovascular: Negative for chest pain, palpitations and leg swelling.   Gastrointestinal: Negative for abdominal pain, diarrhea, heartburn, nausea and vomiting.   Genitourinary: Negative for dysuria, flank pain and hematuria.   Musculoskeletal: Negative for back pain, joint pain, myalgias and neck pain.    Skin: Negative for rash.   Neurological: Positive for weakness. Negative for dizziness, sensory change, speech change, focal weakness and headaches.   Psychiatric/Behavioral: The patient is not nervous/anxious.         Physical Exam  Temp:  [36.1 °C (96.9 °F)-36.7 °C (98.1 °F)] 36.1 °C (96.9 °F)  Pulse:  [67-82] 67  Resp:  [18-20] 18  BP: (139-146)/(66-86) 141/75  SpO2:  [91 %-100 %] 94 %    Physical Exam  Vitals and nursing note reviewed.   HENT:      Head: Normocephalic and atraumatic.      Nose: No congestion.      Mouth/Throat:      Mouth: Mucous membranes are moist.   Eyes:      Conjunctiva/sclera: Conjunctivae normal.      Pupils: Pupils are equal, round, and reactive to light.   Cardiovascular:      Rate and Rhythm: Normal rate and regular rhythm.      Heart sounds: Murmur heard.     Pulmonary:      Effort: Pulmonary effort is normal.      Breath sounds: Rales present.   Abdominal:      General: There is distension.      Tenderness: There is no abdominal tenderness. There is no guarding or rebound.   Genitourinary:     Comments: Gomes in place   Musculoskeletal:         General: No swelling.      Cervical back: No tenderness.      Right knee: No tenderness.      Right lower leg: No edema.      Left lower leg: No edema.   Neurological:      General: No focal deficit present.      Mental Status: She is alert. She is disoriented.      Cranial Nerves: No cranial nerve deficit.   Psychiatric:         Speech: Speech is tangential.         Cognition and Memory: Cognition is impaired. Memory is impaired.         Fluids    Intake/Output Summary (Last 24 hours) at 6/24/2021 1417  Last data filed at 6/24/2021 0800  Gross per 24 hour   Intake 420 ml   Output 1050 ml   Net -630 ml       Laboratory  Recent Labs     06/22/21  0750 06/23/21  0610 06/24/21  1052   WBC 15.6* 10.4 6.8   RBC 3.16* 3.22* 3.55*   HEMOGLOBIN 8.6* 8.8* 9.5*   HEMATOCRIT 27.5* 28.4* 30.4*   MCV 87.0 88.2 85.6   MCH 27.2 27.3 26.8*   MCHC 31.3*  31.0* 31.3*   RDW 66.1* 66.4* 64.3*   PLATELETCT 161* 155* 118*   MPV 12.1 11.5 10.6     Recent Labs     06/22/21  0403 06/22/21  0403 06/23/21  0459 06/23/21  1754 06/24/21  0848   SODIUM 129*  --  130*  --  132*   POTASSIUM 3.2*   < > 3.3* 3.2* 3.4*   CHLORIDE 98  --  102  --  100   CO2 22  --  19*  --  25   GLUCOSE 145*  --  140*  --  148*   BUN 28*  --  25*  --  16   CREATININE 0.71  --  0.61  --  0.64   CALCIUM 7.3*  --  7.5*  --  7.8*    < > = values in this interval not displayed.                   Imaging  EC-ECHOCARDIOGRAM COMPLETE W/ CONT   Final Result      US-EXTREMITY VENOUS LOWER BILAT   Final Result      CT-ABDOMEN-PELVIS WITH   Final Result      1.  Wall thickening of the rectum is noted and there is induration around the perirectal space. Findings could be due to proctitis.      2.  Small right pleural effusion is slightly larger than on prior exam.      3.  Consolidation and volume loss is again noted in each lung base.      4.  Cardiomegaly is again noted.         CT-HEAD W/O   Final Result         NO ACUTE ABNORMALITIES ARE NOTED ON CT SCAN OF THE HEAD.      Findings are consistent with atrophy.  Decreased attenuation in the periventricular white matter likely indicates microvascular ischemic disease.      DX-CHEST-PORTABLE (1 VIEW)   Final Result         Ill-defined opacifications in each lung have increased compared to the prior radiograph.  This could indicate worsening of pulmonary edema or inflammation.           Assessment/Plan  * Sepsis (HCC)- (present on admission)  Assessment & Plan  This is Sepsis Present on admission  SIRS criteria identified on my evaluation include: Fever, with temperature greater than 101 deg F, Tachypnea, with respirations greater than 20 per minute and Leukocytosis, with WBC greater than 12,000  Source - Pneumonia vs Proctitis  Fluid resuscitation ordered per protocol  IV antibiotics as appropriate for source of sepsis  While organ dysfunction may be noted  elsewhere in this problem list or in the chart, degree of organ dysfunction does not meet CMS criteria for severe sepsis        Proctitis- (present on admission)  Assessment & Plan  Change to Omnicef and Flagyl  Bowel protocol    Pulmonary HTN (HCC)- (present on admission)  Assessment & Plan  IV Lasix to diurese    Tricuspid regurgitation- (present on admission)  Assessment & Plan  moderate to severe    Hypophosphatemia- (present on admission)  Assessment & Plan  Start Kphos  Follow level    Hypomagnesemia- (present on admission)  Assessment & Plan  Follow level    Hypokalemia- (present on admission)  Assessment & Plan  K-Dur  Follow bmp    Thrombocytopenia (HCC)- (present on admission)  Assessment & Plan  Follow cbc    Normocytic anemia- (present on admission)  Assessment & Plan  Follow cbc    Chronic respiratory failure with hypoxia (HCC)- (present on admission)  Assessment & Plan  RT protocol    Chronic systolic heart failure (HCC)- (present on admission)  Assessment & Plan  Coreg, Losartan  Increase IV Lasix to diurese  Fluid restriction 1.2 L per day    Urinary retention- (present on admission)  Assessment & Plan  Gomes in place  Follow-up with Urology as outpatient    Dementia (HCC)- (present on admission)  Assessment & Plan  Avoid benzodiazepines and anticholinergics  Frequent orientation  Avoid early morning labs  Avoid vital signs during sleep  Ambulate if possible    Hyponatremia- (present on admission)  Assessment & Plan  Follow bmp    Paroxysmal A-fib (HCC)- (present on admission)  Assessment & Plan  Coreg, Eliquis    Hypertension- (present on admission)  Assessment & Plan  Losartan, Coreg     Type 2 diabetes mellitus with hyperglycemia, without long-term current use of insulin (HCC)- (present on admission)  Assessment & Plan  SSI       VTE prophylaxis: Eliquis

## 2021-06-24 NOTE — DISCHARGE PLANNING
ATTN: Case Management  RE: Referral for Home Health    As of 6/24/21, we have accepted the Home Health referral for the patient listed above.    A [facility] clinician will be out to see the patient within 48 hours. If you have any questions or concerns regarding the patient’s transition to Home Health, please do not hesitate to contact us at [phone].      We look forward to collaborating with you,  [facility] Health Team

## 2021-06-25 ENCOUNTER — PATIENT MESSAGE (OUTPATIENT)
Dept: CARDIOLOGY | Facility: MEDICAL CENTER | Age: 86
End: 2021-06-25

## 2021-06-25 DIAGNOSIS — I50.22 CHRONIC SYSTOLIC HEART FAILURE (HCC): ICD-10-CM

## 2021-06-25 LAB
ALBUMIN SERPL BCP-MCNC: 2.4 G/DL (ref 3.2–4.9)
ALBUMIN/GLOB SERPL: 0.5 G/DL
ALP SERPL-CCNC: 145 U/L (ref 30–99)
ALT SERPL-CCNC: 15 U/L (ref 2–50)
ANION GAP SERPL CALC-SCNC: 8 MMOL/L (ref 7–16)
AST SERPL-CCNC: 28 U/L (ref 12–45)
BACTERIA BLD CULT: NORMAL
BACTERIA BLD CULT: NORMAL
BASOPHILS # BLD AUTO: 0.4 % (ref 0–1.8)
BASOPHILS # BLD: 0.03 K/UL (ref 0–0.12)
BILIRUB SERPL-MCNC: 0.4 MG/DL (ref 0.1–1.5)
BUN SERPL-MCNC: 15 MG/DL (ref 8–22)
CALCIUM SERPL-MCNC: 8.4 MG/DL (ref 8.5–10.5)
CHLORIDE SERPL-SCNC: 97 MMOL/L (ref 96–112)
CO2 SERPL-SCNC: 25 MMOL/L (ref 20–33)
CREAT SERPL-MCNC: 0.57 MG/DL (ref 0.5–1.4)
EOSINOPHIL # BLD AUTO: 0.09 K/UL (ref 0–0.51)
EOSINOPHIL NFR BLD: 1.2 % (ref 0–6.9)
ERYTHROCYTE [DISTWIDTH] IN BLOOD BY AUTOMATED COUNT: 65.1 FL (ref 35.9–50)
GLOBULIN SER CALC-MCNC: 4.7 G/DL (ref 1.9–3.5)
GLUCOSE BLD-MCNC: 129 MG/DL (ref 65–99)
GLUCOSE BLD-MCNC: 172 MG/DL (ref 65–99)
GLUCOSE BLD-MCNC: 184 MG/DL (ref 65–99)
GLUCOSE BLD-MCNC: 200 MG/DL (ref 65–99)
GLUCOSE SERPL-MCNC: 160 MG/DL (ref 65–99)
HCT VFR BLD AUTO: 31 % (ref 37–47)
HGB BLD-MCNC: 9.4 G/DL (ref 12–16)
IMM GRANULOCYTES # BLD AUTO: 0.06 K/UL (ref 0–0.11)
IMM GRANULOCYTES NFR BLD AUTO: 0.8 % (ref 0–0.9)
LYMPHOCYTES # BLD AUTO: 0.48 K/UL (ref 1–4.8)
LYMPHOCYTES NFR BLD: 6.5 % (ref 22–41)
MAGNESIUM SERPL-MCNC: 1.8 MG/DL (ref 1.5–2.5)
MCH RBC QN AUTO: 26.7 PG (ref 27–33)
MCHC RBC AUTO-ENTMCNC: 30.3 G/DL (ref 33.6–35)
MCV RBC AUTO: 88.1 FL (ref 81.4–97.8)
MONOCYTES # BLD AUTO: 0.77 K/UL (ref 0–0.85)
MONOCYTES NFR BLD AUTO: 10.5 % (ref 0–13.4)
NEUTROPHILS # BLD AUTO: 5.9 K/UL (ref 2–7.15)
NEUTROPHILS NFR BLD: 80.6 % (ref 44–72)
NRBC # BLD AUTO: 0 K/UL
NRBC BLD-RTO: 0 /100 WBC
PHOSPHATE SERPL-MCNC: 2.3 MG/DL (ref 2.5–4.5)
PLATELET # BLD AUTO: 192 K/UL (ref 164–446)
PMV BLD AUTO: 10.6 FL (ref 9–12.9)
POTASSIUM SERPL-SCNC: 3.4 MMOL/L (ref 3.6–5.5)
PROT SERPL-MCNC: 7.1 G/DL (ref 6–8.2)
RBC # BLD AUTO: 3.52 M/UL (ref 4.2–5.4)
SIGNIFICANT IND 70042: NORMAL
SIGNIFICANT IND 70042: NORMAL
SITE SITE: NORMAL
SITE SITE: NORMAL
SODIUM SERPL-SCNC: 130 MMOL/L (ref 135–145)
SOURCE SOURCE: NORMAL
SOURCE SOURCE: NORMAL
WBC # BLD AUTO: 7.3 K/UL (ref 4.8–10.8)

## 2021-06-25 PROCEDURE — A9270 NON-COVERED ITEM OR SERVICE: HCPCS | Performed by: FAMILY MEDICINE

## 2021-06-25 PROCEDURE — 80053 COMPREHEN METABOLIC PANEL: CPT

## 2021-06-25 PROCEDURE — 84100 ASSAY OF PHOSPHORUS: CPT

## 2021-06-25 PROCEDURE — 97530 THERAPEUTIC ACTIVITIES: CPT

## 2021-06-25 PROCEDURE — 700111 HCHG RX REV CODE 636 W/ 250 OVERRIDE (IP): Performed by: FAMILY MEDICINE

## 2021-06-25 PROCEDURE — 83735 ASSAY OF MAGNESIUM: CPT

## 2021-06-25 PROCEDURE — A9270 NON-COVERED ITEM OR SERVICE: HCPCS | Performed by: STUDENT IN AN ORGANIZED HEALTH CARE EDUCATION/TRAINING PROGRAM

## 2021-06-25 PROCEDURE — 85025 COMPLETE CBC W/AUTO DIFF WBC: CPT

## 2021-06-25 PROCEDURE — 700102 HCHG RX REV CODE 250 W/ 637 OVERRIDE(OP): Performed by: STUDENT IN AN ORGANIZED HEALTH CARE EDUCATION/TRAINING PROGRAM

## 2021-06-25 PROCEDURE — 99233 SBSQ HOSP IP/OBS HIGH 50: CPT | Performed by: FAMILY MEDICINE

## 2021-06-25 PROCEDURE — 302146: Performed by: FAMILY MEDICINE

## 2021-06-25 PROCEDURE — 36415 COLL VENOUS BLD VENIPUNCTURE: CPT

## 2021-06-25 PROCEDURE — 770006 HCHG ROOM/CARE - MED/SURG/GYN SEMI*

## 2021-06-25 PROCEDURE — 700102 HCHG RX REV CODE 250 W/ 637 OVERRIDE(OP): Performed by: HOSPITALIST

## 2021-06-25 PROCEDURE — 97535 SELF CARE MNGMENT TRAINING: CPT

## 2021-06-25 PROCEDURE — A9270 NON-COVERED ITEM OR SERVICE: HCPCS | Performed by: HOSPITALIST

## 2021-06-25 PROCEDURE — 700102 HCHG RX REV CODE 250 W/ 637 OVERRIDE(OP): Performed by: FAMILY MEDICINE

## 2021-06-25 PROCEDURE — 82962 GLUCOSE BLOOD TEST: CPT | Mod: 91

## 2021-06-25 RX ORDER — MAGNESIUM SULFATE HEPTAHYDRATE 40 MG/ML
2 INJECTION, SOLUTION INTRAVENOUS ONCE
Status: COMPLETED | OUTPATIENT
Start: 2021-06-25 | End: 2021-06-25

## 2021-06-25 RX ADMIN — APIXABAN 2.5 MG: 5 TABLET, FILM COATED ORAL at 17:17

## 2021-06-25 RX ADMIN — POTASSIUM CHLORIDE 20 MEQ: 1500 TABLET, EXTENDED RELEASE ORAL at 06:28

## 2021-06-25 RX ADMIN — CARVEDILOL 3.12 MG: 6.25 TABLET, FILM COATED ORAL at 17:18

## 2021-06-25 RX ADMIN — MAGNESIUM SULFATE 2 G: 2 INJECTION INTRAVENOUS at 13:06

## 2021-06-25 RX ADMIN — DIBASIC SODIUM PHOSPHATE, MONOBASIC POTASSIUM PHOSPHATE AND MONOBASIC SODIUM PHOSPHATE 250 MG: 852; 155; 130 TABLET ORAL at 06:28

## 2021-06-25 RX ADMIN — FUROSEMIDE 40 MG: 10 INJECTION, SOLUTION INTRAMUSCULAR; INTRAVENOUS at 06:29

## 2021-06-25 RX ADMIN — FUROSEMIDE 40 MG: 10 INJECTION, SOLUTION INTRAMUSCULAR; INTRAVENOUS at 17:16

## 2021-06-25 RX ADMIN — CARVEDILOL 3.12 MG: 6.25 TABLET, FILM COATED ORAL at 08:34

## 2021-06-25 RX ADMIN — OXYCODONE 2.5 MG: 5 TABLET ORAL at 19:33

## 2021-06-25 RX ADMIN — METRONIDAZOLE 500 MG: 500 TABLET ORAL at 06:27

## 2021-06-25 RX ADMIN — CEFDINIR 300 MG: 300 CAPSULE ORAL at 17:16

## 2021-06-25 RX ADMIN — POTASSIUM CHLORIDE 20 MEQ: 1500 TABLET, EXTENDED RELEASE ORAL at 17:17

## 2021-06-25 RX ADMIN — METRONIDAZOLE 500 MG: 500 TABLET ORAL at 13:05

## 2021-06-25 RX ADMIN — DIBASIC SODIUM PHOSPHATE, MONOBASIC POTASSIUM PHOSPHATE AND MONOBASIC SODIUM PHOSPHATE 250 MG: 852; 155; 130 TABLET ORAL at 17:17

## 2021-06-25 RX ADMIN — METRONIDAZOLE 500 MG: 500 TABLET ORAL at 21:36

## 2021-06-25 RX ADMIN — CEFDINIR 300 MG: 300 CAPSULE ORAL at 06:28

## 2021-06-25 RX ADMIN — ATORVASTATIN CALCIUM 20 MG: 20 TABLET, FILM COATED ORAL at 21:36

## 2021-06-25 RX ADMIN — INSULIN LISPRO 1 UNITS: 100 INJECTION, SOLUTION INTRAVENOUS; SUBCUTANEOUS at 21:43

## 2021-06-25 RX ADMIN — LOSARTAN POTASSIUM 25 MG: 50 TABLET, FILM COATED ORAL at 06:28

## 2021-06-25 RX ADMIN — INSULIN LISPRO 1 UNITS: 100 INJECTION, SOLUTION INTRAVENOUS; SUBCUTANEOUS at 13:12

## 2021-06-25 RX ADMIN — APIXABAN 2.5 MG: 5 TABLET, FILM COATED ORAL at 06:27

## 2021-06-25 ASSESSMENT — ENCOUNTER SYMPTOMS
FLANK PAIN: 0
HEARTBURN: 0
CHILLS: 0
MYALGIAS: 0
SPEECH CHANGE: 0
FEVER: 0
ABDOMINAL PAIN: 0
DIAPHORESIS: 0
BLURRED VISION: 0
NAUSEA: 0
DIZZINESS: 0
SORE THROAT: 0
BACK PAIN: 0
WEAKNESS: 1
PALPITATIONS: 0
FOCAL WEAKNESS: 0
HEADACHES: 0
COUGH: 0
VOMITING: 0
DIARRHEA: 0
WHEEZING: 0
NECK PAIN: 0
SENSORY CHANGE: 0
NERVOUS/ANXIOUS: 0
SHORTNESS OF BREATH: 0

## 2021-06-25 ASSESSMENT — COGNITIVE AND FUNCTIONAL STATUS - GENERAL
WALKING IN HOSPITAL ROOM: TOTAL
DRESSING REGULAR UPPER BODY CLOTHING: TOTAL
PERSONAL GROOMING: A LOT
SUGGESTED CMS G CODE MODIFIER DAILY ACTIVITY: CL
TOILETING: TOTAL
STANDING UP FROM CHAIR USING ARMS: TOTAL
DAILY ACTIVITIY SCORE: 9
EATING MEALS: A LITTLE
HELP NEEDED FOR BATHING: TOTAL
DRESSING REGULAR LOWER BODY CLOTHING: TOTAL
MOVING FROM LYING ON BACK TO SITTING ON SIDE OF FLAT BED: UNABLE
MOBILITY SCORE: 6
TURNING FROM BACK TO SIDE WHILE IN FLAT BAD: UNABLE
CLIMB 3 TO 5 STEPS WITH RAILING: TOTAL
SUGGESTED CMS G CODE MODIFIER MOBILITY: CN
MOVING TO AND FROM BED TO CHAIR: UNABLE

## 2021-06-25 ASSESSMENT — PAIN DESCRIPTION - PAIN TYPE
TYPE: ACUTE PAIN

## 2021-06-25 ASSESSMENT — GAIT ASSESSMENTS: GAIT LEVEL OF ASSIST: UNABLE TO PARTICIPATE

## 2021-06-25 NOTE — THERAPY
Physical Therapy   Daily Treatment     Patient Name: Yoon Richards  Age:  89 y.o., Sex:  female  Medical Record #: [id number]  Today's Date: 6/25/2021     Precautions:  Fall Risk    Assessment  Pt with family present upon arrival. Minimally verbal and heavy cueing for task at hand. Requires Total assist (2 person) for bed mobility and STS transfer EOB MAX x 2 unable to complete stand with 2 attempts. Pt with BM upon STS attempts and requires total assist for cleaning and bedding change with rolling side<>side. Limited by pain, fatigue and cognitive impairment. At risk for further functional decline and would benefit from skilled PT and family training to improve functional status. Will progress POC as tolerated.     Plan    Continue current treatment plan.    DC Equipment Recommendations:  Unable to determine at this time  Discharge Recommendations: Recommend post-acute placement for additional physical therapy services prior to discharge home      Subjective  Pt minimally verbal/communicative. Reports no pain at rest. Pain in R knee with mobility. Heavy cueing for orientation.      Objective  Total assist (2 person) for bed mobility and STS transfer EOB MAX x 2 unable to complete stand with 2 attempts.      06/25/21 1220   Total Time Spent   Total Time Spent (Mins) 33   Charge Group   Charges  Yes   PT Therapeutic Activities 2   Precautions   Precautions Fall Risk   Pain 0 - 10 Group   Therapist Pain Assessment During Activity  (pain in R knee not quantified)   Non Verbal Descriptors   Non Verbal Scale  Calm   Cognition    Comments Agreeable but heavy cueing required. Family present   Neuro-Muscular Treatments   Neuro-Muscular Treatments Postural Facilitation   Comments Static sit EOB   Balance   Sitting Balance (Static) Fair -   Sitting Balance (Dynamic) Poor   Standing Balance (Static) Trace   Standing Balance (Dynamic) Dependent   Weight Shift Sitting Absent   Weight Shift Standing Absent   Gait Analysis   Gait  Level Of Assist Unable to Participate   Bed Mobility    Supine to Sit Total Assist  (2 person)   Sit to Supine Total Assist   Scooting Total Assist   Rolling Maximum Assist to Lt.;Minimal Assist to Rt.   Skilled Intervention Verbal Cuing;Tactile Cuing   Functional Mobility   Sit to Stand Maximal Assist  (2 person, unable to achieve full stand, 2 attempts)   Mobility in bed, EOB   How much difficulty does the patient currently have...   Turning over in bed (including adjusting bedclothes, sheets and blankets)? 1   Sitting down on and standing up from a chair with arms (e.g., wheelchair, bedside commode, etc.) 1   Moving from lying on back to sitting on the side of the bed? 1   How much help from another person does the patient currently need...   Moving to and from a bed to a chair (including a wheelchair)? 1   Need to walk in a hospital room? 1   Climbing 3-5 steps with a railing? 1   6 clicks Mobility Score 6   Activity Tolerance   Sitting in Chair NT   Sitting Edge of Bed >10   Standing <1    Comments Fatigues, pain in R knee   Patient / Family Goals    Patient / Family Goal #1 Home   Goal #1 Outcome Goal not met   Short Term Goals    Short Term Goal # 1 Pt will perform supine<>sit with min A within 6 visits to improve participation for DC home with family.   Goal Outcome # 1 goal not met   Short Term Goal # 2 Pt will perform STS with min A within 6 visits to initiate transfers/gait.   Goal Outcome # 2 Goal not met   Short Term Goal # 3 Pt will perform all fxnl transfers with FWW and mod A within 6 vistis to increase OOB activity.   Goal Outcome # 3 Goal not met   Education Group   Education Provided Role of Physical Therapist   Role of Physical Therapist Patient Response Patient;Acceptance;Explanation;Verbal Demonstration   Anticipated Discharge Equipment and Recommendations   DC Equipment Recommendations Unable to determine at this time   Discharge Recommendations Recommend post-acute placement for additional  physical therapy services prior to discharge home   Interdisciplinary Plan of Care Collaboration   IDT Collaboration with  Nursing   Patient Position at End of Therapy In Bed;Bed Alarm On;Call Light within Reach;Tray Table within Reach;Family / Friend in Room;Phone within Reach   Collaboration Comments RN updated   Session Information   Date / Session Number  6/25-2(2/3, 6/28)

## 2021-06-25 NOTE — FACE TO FACE
"Face to Face Note  -  Durable Medical Equipment    Hitesh Eli M.D. - NPI: [id number]  I certify that this patient is under my care and that they had a durable medical equipment(DME)face to face encounter by myself that meets the physician DME face-to-face encounter requirements with this patient on:    Date of encounter:   Patient:                    MRN:                       YOB: 2021  Yoon Richards  [id number]  2/29/1928     The encounter with the patient was in whole, or in part, for the following medical condition, which is the primary reason for durable medical equipment:  CHF    I certify that, based on my findings, the following durable medical equipment is medically necessary:  Oxygen.    HOME O2 Saturation Measurements:(Values must be present for Home Oxygen orders)  Room air sat at rest: 81  Room air sat with amb: unable to ambulate (unable to ambulate)  With liters of O2: 2, O2 sat at rest with O2: 96   , O2 sat with amb with O2 : unable to ambulate       My Clinical findings support the need for the above equipment due to:  Hypoxia    Supporting Symptoms: The patient requires supplemental oxygen, as the following interventions have been tried with limited or no improvement: \"Incentive spirometry    If patient feels more short of breath, they can go up to 6 liters per minute and contact healthcare provider.  "

## 2021-06-25 NOTE — CARE PLAN
Shift Goals  Clinical Goals: maintain skin integrity  Patient Goals: safety    Progress made toward(s) clinical / shift goals: additional skin protection interventions in use    Patient is not progressing towards the following goals:      Problem: Knowledge Deficit - Standard  Goal: Patient and family/care givers will demonstrate understanding of plan of care, disease process/condition, diagnostic tests and medications  Outcome: Progressing     Problem: Hemodynamics  Goal: Patient's hemodynamics, fluid balance and neurologic status will be stable or improve  Outcome: Progressing     Problem: Fluid Volume  Goal: Fluid volume balance will be maintained  Outcome: Progressing     Problem: Urinary - Renal Perfusion  Goal: Ability to achieve and maintain adequate renal perfusion and functioning will improve  Outcome: Progressing     Problem: Respiratory  Goal: Patient will achieve/maintain optimum respiratory ventilation and gas exchange  Outcome: Progressing     Problem: Mechanical Ventilation  Goal: Safe management of artificial airway and ventilation  Outcome: Progressing  Goal: Successful weaning off mechanical ventilator, spontaneously maintains adequate gas exchange  Outcome: Progressing  Goal: Patient will be able to express needs and understand communication  Outcome: Progressing     Problem: Physical Regulation  Goal: Diagnostic test results will improve  Outcome: Progressing  Goal: Signs and symptoms of infection will decrease  Outcome: Progressing     Problem: Skin Integrity  Goal: Skin integrity is maintained or improved  Outcome: Progressing     Problem: Fall Risk  Goal: Patient will remain free from falls  Outcome: Progressing     Problem: Pain - Standard  Goal: Alleviation of pain or a reduction in pain to the patient’s comfort goal  Outcome: Progressing

## 2021-06-25 NOTE — PROGRESS NOTES
At time of initial assessment this RN was on phone with daughter. Daughter expressed desire for someone to help her mother with dentures. This RN saw denture cup and that it was empty. This RN asked patient if she had her dentures in and patient nodded, however this RN did not visualize dentures independently.   When son was at bedside he mentioning his mom is missing dentures. Room searched, both dietary trays were still in room.   Dietary called to notify of missing teeth.   Charge RN notified of missing dentures.

## 2021-06-25 NOTE — DISCHARGE PLANNING
Received Choice form at 1421  Agency/Facility Name: [facility]  Referral sent per Choice form @ [phone] -[phone]  Agency/Facility Name: [facility]  Spoke To: Ashley  Outcome: Per Valorie Ramirez will call this DPA back     [phone]  Agency/Facility Name: [facility]   Spoke To: Caitlin  Outcome: Per Caitlin, this Pt is accepted.  Portable will be delivered to bedside today.  ETA unknown.  Home set up will be delivered home tomorrow 06/26     RN GABBIE notified

## 2021-06-25 NOTE — CARE PLAN
The patient is Stable - Low risk of patient condition declining or worsening    Shift Goals  Clinical Goals: pt safety will be maintained    Progress made toward(s) clinical / shift goals:  Pt on bed alarm, pt does not attempt unsafe ambulation    Problem: Respiratory  Goal: Patient will achieve/maintain optimum respiratory ventilation and gas exchange  Outcome: Progressing     Problem: Skin Integrity  Goal: Skin integrity is maintained or improved  Outcome: Progressing       Patient is not progressing towards the following goals:      Problem: Knowledge Deficit - Standard  Goal: Patient and family/care givers will demonstrate understanding of plan of care, disease process/condition, diagnostic tests and medications  Outcome: Not Progressing   Pt A&O x2 at baseline.  Only oriented to person and place at this time

## 2021-06-25 NOTE — PROGRESS NOTES
Heels red, boggy, slow to debra. Mepilex applied, floated on pillows.  Sacrum red and pink. Intact but areas slow to debra. Meliplex not used due to incont. Q 2 turns in use with waffle. Bruising noted on R hand. PIV removed.   Gomes care given with soap and water. Stat lock in use. Bag below level of bladder. Redness noted on back, quick to debra, wrinkles smoothed.   Blanching red/pink behind ears.

## 2021-06-25 NOTE — PROGRESS NOTES
[facility] Medicine Daily Progress Note    Date of Service  6/25/2021    Chief Complaint  89 y.o. female admitted 6/20/2021 with proctitis.    Hospital Course  Admitted with Sepsis possibly secondary to Proctitis which was noted on CT scan of the abdomen and pelvis. There was also consideration for possible pneumonia.  She was covered empirically with IV Unasyn.  Gastroenterology was consulted on the case, patient underwent flexible sigmoidoscopy on 6/22/2021 which showed slightly edematous rectal mucosa without ulceration status post biopsy, 2 diminutive sigmoid polyp removed, Left-sided sigmoid colon biopsy, Solid stool in the rectal vault, removed.  She also has known history of paroxysmal atrial fibrillation, congestive heart failure, and appeared to be volume overloaded. She was started on diuresis with IV Lasix. She also has known history of Urinary retention, and Gomes catheter has been placed.  Pathology results did show confirmation of proctitis.  She responded well to the IV Unasyn and her WBC has trended down.  With IV Lasix diuresis, multiple electrolytes were also being replaced.     Interval Problem Update  Proctitis - denies rectal pain, pathology consistent with proctitis  Resp failure - O2 2 lpm NC  HTN - sbp 120-140  Diabetes - -200  Afib - currently sinus  CHF - EF 30%  Low potassium, phosphorus, magnesium    Updates given and plan of care discussed with patient's daughter.     Consultants/Specialty  Gastroenterology    Code Status  DNAR/DNI    Disposition  Home health, Home O2, DME    Review of Systems  Review of Systems   Constitutional: Negative for chills, diaphoresis, fever and malaise/fatigue.   HENT: Negative for congestion, hearing loss and sore throat.    Eyes: Negative for blurred vision.   Respiratory: Negative for cough, shortness of breath and wheezing.    Cardiovascular: Negative for chest pain, palpitations and leg swelling.   Gastrointestinal: Negative for abdominal pain,  diarrhea, heartburn, nausea and vomiting.   Genitourinary: Negative for dysuria, flank pain and hematuria.   Musculoskeletal: Negative for back pain, joint pain, myalgias and neck pain.   Skin: Negative for rash.   Neurological: Positive for weakness. Negative for dizziness, sensory change, speech change, focal weakness and headaches.   Psychiatric/Behavioral: The patient is not nervous/anxious.         Physical Exam  Temp:  [36.2 °C (97.2 °F)-36.4 °C (97.6 °F)] 36.2 °C (97.2 °F)  Pulse:  [62-91] 91  Resp:  [18-20] 18  BP: (125-145)/(56-80) 140/68  SpO2:  [95 %-99 %] 99 %    Physical Exam  Vitals and nursing note reviewed.   HENT:      Head: Normocephalic and atraumatic.      Nose: No congestion.      Mouth/Throat:      Mouth: Mucous membranes are moist.   Eyes:      Conjunctiva/sclera: Conjunctivae normal.      Pupils: Pupils are equal, round, and reactive to light.   Cardiovascular:      Rate and Rhythm: Normal rate and regular rhythm.      Heart sounds: Murmur heard.     Pulmonary:      Effort: Pulmonary effort is normal.      Breath sounds: Rales present.   Abdominal:      General: There is distension.      Tenderness: There is no abdominal tenderness. There is no guarding or rebound.   Genitourinary:     Comments: Gomes in place   Musculoskeletal:         General: No swelling.      Cervical back: No tenderness.      Right knee: No tenderness.      Right lower leg: No edema.      Left lower leg: No edema.   Neurological:      General: No focal deficit present.      Mental Status: She is alert. She is disoriented.      Cranial Nerves: No cranial nerve deficit.   Psychiatric:         Speech: Speech is tangential.         Cognition and Memory: Cognition is impaired. Memory is impaired.         Fluids    Intake/Output Summary (Last 24 hours) at 6/25/2021 1012  Last data filed at 6/25/2021 0800  Gross per 24 hour   Intake 540 ml   Output 1650 ml   Net -1110 ml       Laboratory  Recent Labs     06/23/21  0610  06/24/21  1052 06/25/21  0745   WBC 10.4 6.8 7.3   RBC 3.22* 3.55* 3.52*   HEMOGLOBIN 8.8* 9.5* 9.4*   HEMATOCRIT 28.4* 30.4* 31.0*   MCV 88.2 85.6 88.1   MCH 27.3 26.8* 26.7*   MCHC 31.0* 31.3* 30.3*   RDW 66.4* 64.3* 65.1*   PLATELETCT 155* 118* 192   MPV 11.5 10.6 10.6     Recent Labs     06/23/21  0459 06/23/21  0459 06/23/21  1754 06/24/21  0848 06/25/21  0745   SODIUM 130*  --   --  132* 130*   POTASSIUM 3.3*   < > 3.2* 3.4* 3.4*   CHLORIDE 102  --   --  100 97   CO2 19*  --   --  25 25   GLUCOSE 140*  --   --  148* 160*   BUN 25*  --   --  16 15   CREATININE 0.61  --   --  0.64 0.57   CALCIUM 7.5*  --   --  7.8* 8.4*    < > = values in this interval not displayed.                   Imaging  EC-ECHOCARDIOGRAM COMPLETE W/ CONT   Final Result      US-EXTREMITY VENOUS LOWER BILAT   Final Result      CT-ABDOMEN-PELVIS WITH   Final Result      1.  Wall thickening of the rectum is noted and there is induration around the perirectal space. Findings could be due to proctitis.      2.  Small right pleural effusion is slightly larger than on prior exam.      3.  Consolidation and volume loss is again noted in each lung base.      4.  Cardiomegaly is again noted.         CT-HEAD W/O   Final Result         NO ACUTE ABNORMALITIES ARE NOTED ON CT SCAN OF THE HEAD.      Findings are consistent with atrophy.  Decreased attenuation in the periventricular white matter likely indicates microvascular ischemic disease.      DX-CHEST-PORTABLE (1 VIEW)   Final Result         Ill-defined opacifications in each lung have increased compared to the prior radiograph.  This could indicate worsening of pulmonary edema or inflammation.           Assessment/Plan  * Sepsis (HCC)- (present on admission)  Assessment & Plan  This is Sepsis Present on admission  SIRS criteria identified on my evaluation include: Fever, with temperature greater than 101 deg F, Tachypnea, with respirations greater than 20 per minute and Leukocytosis, with WBC  greater than 12,000  Source - Pneumonia vs Proctitis  Fluid resuscitation ordered per protocol  IV antibiotics as appropriate for source of sepsis  While organ dysfunction may be noted elsewhere in this problem list or in the chart, degree of organ dysfunction does not meet CMS criteria for severe sepsis        Proctitis- (present on admission)  Assessment & Plan  Omnicef and Flagyl to complete course  Bowel protocol    Pulmonary HTN (HCC)- (present on admission)  Assessment & Plan  IV Lasix to diurese    Tricuspid regurgitation- (present on admission)  Assessment & Plan  moderate to severe    Hypophosphatemia- (present on admission)  Assessment & Plan  Kphos  Follow level    Hypomagnesemia- (present on admission)  Assessment & Plan  IV Mg 2 g  Follow level    Hypokalemia- (present on admission)  Assessment & Plan  Kdur  Follow bmp    Thrombocytopenia (HCC)- (present on admission)  Assessment & Plan  Follow cbc    Normocytic anemia- (present on admission)  Assessment & Plan  Follow cbc    Chronic respiratory failure with hypoxia (HCC)- (present on admission)  Assessment & Plan  RT protocol    Chronic systolic heart failure (HCC)- (present on admission)  Assessment & Plan  Coreg, Losartan  IV Lasix to diurese  Fluid restriction 1.2 L per day    Urinary retention- (present on admission)  Assessment & Plan  Gomes in place  Follow-up with Urology as outpatient - scheduled for 7/6/2021    Dementia (HCC)- (present on admission)  Assessment & Plan  Avoid benzodiazepines and anticholinergics  Frequent orientation  Avoid early morning labs  Avoid vital signs during sleep  Ambulate if possible    Hyponatremia- (present on admission)  Assessment & Plan  Follow bmp    Paroxysmal A-fib (HCC)- (present on admission)  Assessment & Plan  Coreg, Eliquis    Hypertension- (present on admission)  Assessment & Plan  Losartan, Coreg     Type 2 diabetes mellitus with hyperglycemia, without long-term current use of insulin (HCC)- (present  on admission)  Assessment & Plan  SSI       VTE prophylaxis: Eliquis

## 2021-06-25 NOTE — PROGRESS NOTES
Received report from previous shift RN  Assessment complete.  A&O x 2, oriented to self and time. Patient calls appropriately.  Patient ambulates with x2 assist. Bed alarm on.   Patient has 0/10 pain.   Denies N&V. Tolerating low fiber, GI soft diet.  Gomes in place, + flatus, 6/23 BM.  Patient denies SOB.  SCD's on.    Review plan with of care with patient. Call light and personal belongings with in reach. Hourly rounding in place. All needs met at this time.

## 2021-06-25 NOTE — THERAPY
"Occupational Therapy  Daily Treatment     Patient Name: Yoon Richards  Age:  89 y.o., Sex:  female  Medical Record #: [id number]  Today's Date: 6/25/2021     Precautions  Precautions: (P) Fall Risk    Assessment    Pt seen for OT tx session. Pt required total A for bed mobility, sit<>stand, and pericare. Pt donned gown w/ max A, total A to don socks. Pt demo'd impaired balance, functional mobility, activity tolerance and cognition. Will continue to follow.     Plan    Continue current treatment plan.    DC Equipment Recommendations: (P) Unable to determine at this time  Discharge Recommendations: (P) Recommend post-acute placement for additional occupational therapy services prior to discharge home    Subjective    \"She is swearing in [place]\" - Her family member     Objective       06/25/21 1544   Total Time Spent   Total Time Spent (Mins) 15   Treatment Charges   Charges Yes   OT Self Care / ADL 1   Precautions   Precautions Fall Risk   Vitals   O2 (LPM) 2   O2 Delivery Device Silicone Nasal Cannula   Pain 0 - 10 Group   Therapist Pain Assessment Post Activity Pain Same as Prior to Activity;Nurse Notified  (c/o back pain)   Cognition    Level of Consciousness Alert   Ability To Follow Commands 1 Step   Safety Awareness Impaired   New Learning Impaired   Sequencing Impaired   Initiation Impaired   Comments cooperative, does not appear to internalize new ed   Active ROM Upper Body   Active ROM Upper Body  WDL   Other Treatments   Other Treatments Provided linens changed   Balance   Sitting Balance (Static) Poor   Sitting Balance (Dynamic) Poor -   Standing Balance (Static) Trace   Standing Balance (Dynamic) Dependent   Weight Shift Sitting Poor   Weight Shift Standing Absent   Skilled Intervention Tactile Cuing;Verbal Cuing;Postural Facilitation;Facilitation   Comments w/ FWW   Bed Mobility    Supine to Sit Total Assist   Sit to Supine Total Assist   Scooting Total Assist   Rolling Maximum Assist to Lt.;Maximal " Assist to Rt.   Skilled Intervention Tactile Cuing;Verbal Cuing;Facilitation   Activities of Daily Living   Grooming Standing;Moderate Assist   Lower Body Dressing Total Assist  (socks)   Toileting Total Assist  (incont of bowel)   Functional Mobility   Sit to Stand Total Assist  (unable to reach full stand)   Mobility supine<>sit<>stand   Skilled Intervention Tactile Cuing;Verbal Cuing;Facilitation   Comments B LE's appear to be contracted   Visual Perception   Visual Perception  Not Tested   Activity Tolerance   Sitting in Chair unable   Sitting Edge of Bed 10 min   Standing <30 seconds   Patient / Family Goals   Patient / Family Goal #1 To get stronger and go home   Goal #1 Outcome Goal not met   Short Term Goals   Short Term Goal # 1 Pt will complete [place] [facility] [place] Josselyn by discharge.   Goal Outcome # 1 Goal not met   Short Term Goal # 2 Pt will complete toileting with Josselyn by discharge.   Goal Outcome # 2 Goal not met   Education Group   Role of Occupational Therapist Patient Response Patient;Family;Acceptance;Explanation;Verbal Demonstration   Adaptive Equipment Patient Response Family;Acceptance;Explanation;Verbal Demonstration   Anticipated Discharge Equipment and Recommendations   DC Equipment Recommendations Unable to determine at this time   Discharge Recommendations Recommend post-acute placement for additional occupational therapy services prior to discharge home   Interdisciplinary Plan of Care Collaboration   IDT Collaboration with  Nursing   Patient Position at End of Therapy In Bed;Call Light within Reach;Tray Table within Reach;Phone within Reach   Collaboration Comments report given   Session Information   Date / Session Number  6/25, 2 (2/3, 6/28)   Priority 2

## 2021-06-25 NOTE — DISCHARGE PLANNING
Care Transition Team Discharge Planning    Anticipates discharge disposition:  • Home with Home Health    Action:  • This RN CM is following the case.. Pt has been accepted by [facility].  •  Per [facility] [employer]. Pt's choice for DME is [facility] only because it is the only contracted DME company with [employer] Insurance per Pt's daughter. Sent a message to RENEE Hope via voalte to inquire if Pt needs oxygen, awaiting response and order if needed.    Per RENEE Hope ,Pt needs oxygen and she will requested DME order and face to face from Dr Eli.  Choice for DME [facility] faxed to Ryan STEPHENSON.    This RN CM informed Elana Pt' daughter of this update.     Pt 's daughter is aware that Pt's denture is missing and explained to her that the bedside staff are looking for it.    Barriers to Discharge:  • Pending medical clearance  • Possible DME acceptance for Oxygen    Plan:  • Will continue to assist Pt with discharge as needed

## 2021-06-26 PROBLEM — R54 FRAILTY SYNDROME IN GERIATRIC PATIENT: Status: ACTIVE | Noted: 2021-06-26

## 2021-06-26 PROBLEM — G93.40 ENCEPHALOPATHY: Status: ACTIVE | Noted: 2021-06-26

## 2021-06-26 LAB
ANION GAP SERPL CALC-SCNC: 9 MMOL/L (ref 7–16)
BASOPHILS # BLD AUTO: 0.3 % (ref 0–1.8)
BASOPHILS # BLD: 0.03 K/UL (ref 0–0.12)
BUN SERPL-MCNC: 12 MG/DL (ref 8–22)
CALCIUM SERPL-MCNC: 8.2 MG/DL (ref 8.5–10.5)
CHLORIDE SERPL-SCNC: 97 MMOL/L (ref 96–112)
CO2 SERPL-SCNC: 26 MMOL/L (ref 20–33)
CREAT SERPL-MCNC: 0.58 MG/DL (ref 0.5–1.4)
EOSINOPHIL # BLD AUTO: 0.04 K/UL (ref 0–0.51)
EOSINOPHIL NFR BLD: 0.4 % (ref 0–6.9)
ERYTHROCYTE [DISTWIDTH] IN BLOOD BY AUTOMATED COUNT: 62.4 FL (ref 35.9–50)
GLUCOSE BLD-MCNC: 146 MG/DL (ref 65–99)
GLUCOSE BLD-MCNC: 154 MG/DL (ref 65–99)
GLUCOSE BLD-MCNC: 163 MG/DL (ref 65–99)
GLUCOSE BLD-MCNC: 188 MG/DL (ref 65–99)
GLUCOSE SERPL-MCNC: 204 MG/DL (ref 65–99)
HCT VFR BLD AUTO: 29.5 % (ref 37–47)
HGB BLD-MCNC: 9.4 G/DL (ref 12–16)
IMM GRANULOCYTES # BLD AUTO: 0.08 K/UL (ref 0–0.11)
IMM GRANULOCYTES NFR BLD AUTO: 0.8 % (ref 0–0.9)
LYMPHOCYTES # BLD AUTO: 0.48 K/UL (ref 1–4.8)
LYMPHOCYTES NFR BLD: 5 % (ref 22–41)
MAGNESIUM SERPL-MCNC: 1.6 MG/DL (ref 1.5–2.5)
MCH RBC QN AUTO: 26.9 PG (ref 27–33)
MCHC RBC AUTO-ENTMCNC: 31.9 G/DL (ref 33.6–35)
MCV RBC AUTO: 84.3 FL (ref 81.4–97.8)
MONOCYTES # BLD AUTO: 1 K/UL (ref 0–0.85)
MONOCYTES NFR BLD AUTO: 10.5 % (ref 0–13.4)
NEUTROPHILS # BLD AUTO: 7.91 K/UL (ref 2–7.15)
NEUTROPHILS NFR BLD: 83 % (ref 44–72)
NRBC # BLD AUTO: 0 K/UL
NRBC BLD-RTO: 0 /100 WBC
NT-PROBNP SERPL IA-MCNC: ABNORMAL PG/ML (ref 0–125)
PHOSPHATE SERPL-MCNC: 3.4 MG/DL (ref 2.5–4.5)
PLATELET # BLD AUTO: 178 K/UL (ref 164–446)
PMV BLD AUTO: 11.5 FL (ref 9–12.9)
POTASSIUM SERPL-SCNC: 3.7 MMOL/L (ref 3.6–5.5)
RBC # BLD AUTO: 3.5 M/UL (ref 4.2–5.4)
SODIUM SERPL-SCNC: 132 MMOL/L (ref 135–145)
WBC # BLD AUTO: 9.5 K/UL (ref 4.8–10.8)

## 2021-06-26 PROCEDURE — 84100 ASSAY OF PHOSPHORUS: CPT

## 2021-06-26 PROCEDURE — 700102 HCHG RX REV CODE 250 W/ 637 OVERRIDE(OP): Performed by: STUDENT IN AN ORGANIZED HEALTH CARE EDUCATION/TRAINING PROGRAM

## 2021-06-26 PROCEDURE — 80048 BASIC METABOLIC PNL TOTAL CA: CPT

## 2021-06-26 PROCEDURE — A9270 NON-COVERED ITEM OR SERVICE: HCPCS | Performed by: FAMILY MEDICINE

## 2021-06-26 PROCEDURE — 700102 HCHG RX REV CODE 250 W/ 637 OVERRIDE(OP): Performed by: HOSPITALIST

## 2021-06-26 PROCEDURE — 700102 HCHG RX REV CODE 250 W/ 637 OVERRIDE(OP): Performed by: FAMILY MEDICINE

## 2021-06-26 PROCEDURE — A9270 NON-COVERED ITEM OR SERVICE: HCPCS | Performed by: HOSPITALIST

## 2021-06-26 PROCEDURE — 770006 HCHG ROOM/CARE - MED/SURG/GYN SEMI*

## 2021-06-26 PROCEDURE — 83735 ASSAY OF MAGNESIUM: CPT

## 2021-06-26 PROCEDURE — 700111 HCHG RX REV CODE 636 W/ 250 OVERRIDE (IP): Performed by: FAMILY MEDICINE

## 2021-06-26 PROCEDURE — 83880 ASSAY OF NATRIURETIC PEPTIDE: CPT

## 2021-06-26 PROCEDURE — A9270 NON-COVERED ITEM OR SERVICE: HCPCS | Performed by: STUDENT IN AN ORGANIZED HEALTH CARE EDUCATION/TRAINING PROGRAM

## 2021-06-26 PROCEDURE — 99233 SBSQ HOSP IP/OBS HIGH 50: CPT | Performed by: FAMILY MEDICINE

## 2021-06-26 PROCEDURE — 36415 COLL VENOUS BLD VENIPUNCTURE: CPT

## 2021-06-26 PROCEDURE — 82962 GLUCOSE BLOOD TEST: CPT

## 2021-06-26 PROCEDURE — 85025 COMPLETE CBC W/AUTO DIFF WBC: CPT

## 2021-06-26 RX ORDER — MAGNESIUM SULFATE HEPTAHYDRATE 40 MG/ML
2 INJECTION, SOLUTION INTRAVENOUS ONCE
Status: COMPLETED | OUTPATIENT
Start: 2021-06-26 | End: 2021-06-26

## 2021-06-26 RX ADMIN — INSULIN LISPRO 1 UNITS: 100 INJECTION, SOLUTION INTRAVENOUS; SUBCUTANEOUS at 17:13

## 2021-06-26 RX ADMIN — FUROSEMIDE 40 MG: 10 INJECTION, SOLUTION INTRAMUSCULAR; INTRAVENOUS at 17:10

## 2021-06-26 RX ADMIN — DOCUSATE SODIUM 50 MG AND SENNOSIDES 8.6 MG 2 TABLET: 8.6; 5 TABLET, FILM COATED ORAL at 06:21

## 2021-06-26 RX ADMIN — POTASSIUM CHLORIDE 20 MEQ: 1500 TABLET, EXTENDED RELEASE ORAL at 06:21

## 2021-06-26 RX ADMIN — OXYCODONE 5 MG: 5 TABLET ORAL at 20:17

## 2021-06-26 RX ADMIN — POTASSIUM CHLORIDE 20 MEQ: 1500 TABLET, EXTENDED RELEASE ORAL at 17:15

## 2021-06-26 RX ADMIN — CARVEDILOL 3.12 MG: 6.25 TABLET, FILM COATED ORAL at 07:55

## 2021-06-26 RX ADMIN — MAGNESIUM SULFATE 2 G: 2 INJECTION INTRAVENOUS at 12:46

## 2021-06-26 RX ADMIN — METRONIDAZOLE 500 MG: 500 TABLET ORAL at 06:21

## 2021-06-26 RX ADMIN — METRONIDAZOLE 500 MG: 500 TABLET ORAL at 14:45

## 2021-06-26 RX ADMIN — LOSARTAN POTASSIUM 25 MG: 50 TABLET, FILM COATED ORAL at 06:22

## 2021-06-26 RX ADMIN — CARVEDILOL 3.12 MG: 6.25 TABLET, FILM COATED ORAL at 17:15

## 2021-06-26 RX ADMIN — CEFDINIR 300 MG: 300 CAPSULE ORAL at 06:22

## 2021-06-26 RX ADMIN — APIXABAN 2.5 MG: 5 TABLET, FILM COATED ORAL at 17:16

## 2021-06-26 RX ADMIN — OXYCODONE 5 MG: 5 TABLET ORAL at 06:33

## 2021-06-26 RX ADMIN — METRONIDAZOLE 500 MG: 500 TABLET ORAL at 20:17

## 2021-06-26 RX ADMIN — ATORVASTATIN CALCIUM 20 MG: 20 TABLET, FILM COATED ORAL at 20:17

## 2021-06-26 RX ADMIN — CEFDINIR 300 MG: 300 CAPSULE ORAL at 17:15

## 2021-06-26 RX ADMIN — DIBASIC SODIUM PHOSPHATE, MONOBASIC POTASSIUM PHOSPHATE AND MONOBASIC SODIUM PHOSPHATE 250 MG: 852; 155; 130 TABLET ORAL at 06:22

## 2021-06-26 RX ADMIN — APIXABAN 2.5 MG: 5 TABLET, FILM COATED ORAL at 06:22

## 2021-06-26 RX ADMIN — FUROSEMIDE 40 MG: 10 INJECTION, SOLUTION INTRAMUSCULAR; INTRAVENOUS at 06:22

## 2021-06-26 RX ADMIN — INSULIN LISPRO 1 UNITS: 100 INJECTION, SOLUTION INTRAVENOUS; SUBCUTANEOUS at 12:43

## 2021-06-26 ASSESSMENT — ENCOUNTER SYMPTOMS
SENSORY CHANGE: 0
HEADACHES: 0
FEVER: 0
BACK PAIN: 0
WHEEZING: 0
WEAKNESS: 1
FLANK PAIN: 0
COUGH: 0
CHILLS: 0
FOCAL WEAKNESS: 0
NERVOUS/ANXIOUS: 0
SHORTNESS OF BREATH: 0
PALPITATIONS: 0
MYALGIAS: 0
SORE THROAT: 0
DIARRHEA: 0
HEARTBURN: 0
NECK PAIN: 0
NAUSEA: 0
DIAPHORESIS: 0
VOMITING: 0
DIZZINESS: 0
ABDOMINAL PAIN: 0
SPEECH CHANGE: 0
BLURRED VISION: 0

## 2021-06-26 ASSESSMENT — PAIN DESCRIPTION - PAIN TYPE
TYPE: ACUTE PAIN

## 2021-06-26 ASSESSMENT — FIBROSIS 4 INDEX
FIB4 SCORE: 3.5
FIB4 SCORE: 3.78

## 2021-06-26 NOTE — CARE PLAN
The patient is Stable - Low risk of patient condition declining or worsening    Shift Goals  Clinical Goals: pain management; maintain skin integrity  Patient Goals: safety    Progress made toward(s) clinical / shift goals:    Problem: Skin Integrity  Goal: Skin integrity is maintained or improved  Outcome: Progressing     Problem: Fall Risk  Goal: Patient will remain free from falls  Outcome: Progressing     Problem: Pain - Standard  Goal: Alleviation of pain or a reduction in pain to the patient’s comfort goal  Outcome: Progressing       Patient is not progressing towards the following goals:

## 2021-06-26 NOTE — DIETARY
"Nutrition services: Day 6 of admit.  Yoon Richards is a 89 y.o. female with admitting DX of AMS.   Consult received for Poor PO intake.     Met w/ daughter at bedside. Daughter reports pt's oral intake has significantly decreased in the last week and she has seen a major change in her energy/alertness. Reports pt no longer feeding herself, needs assistance with meals. States pt lives with her and typically eats well including 1 Glucerna shake per day (1/2 at breakfast, 1/2 at dinner). Typical meals consist of fish (salmon, halibut), tofu, sweet potatoes, spinach, eggs, toast etc. Pt's dentures lost since admit and daughter is concerned with how this will affect her nutritional status further. Diet texture changed yesterday to Minced and Moist to accommodate for missing dentures. Discussed how to prepare meals for ease of chewing without dentures - mash foods, add gravy's or butter to optimize calories and moisten foods. Daughter follows fluid restriction, low Na and moderate carbohydrate diet very closely at home. RD to provide high-calorie, high-protein handout to bedside this afternoon. Discussed supplements and diet liberalization. RD to add Boost GC at breakfast (in place of coffee) and Magic Cup BID between meals to optimize kcals, protein. NR met with daughter following interview to review meal selections. Discussed 1:1 supervision for meals/snacks with RN.     Assessment:  Height: 160 cm (5' 3\")  Weight: 73.4 kg (161 lb 13.1 oz) - Bed scale, question accuracy. Admit stated weight on 6/20 was 62.6 kg (138#).   Body mass index is 28.66 kg/m²., BMI classification: Overweight.   Diet/Intake: Level 6 (soft & bite sized), Thin Liquids, 1200 ml fluid restriction with supplements.     Evaluation:   1. Pt admitted with AMS, proctitis s/p sigmoidoscopy 6/22. Pertinent hx of CHF, a-fib and DM.   2. Declining PO 6/25: < 25% x 3 meals. Diet texture changed to Minced and Moist 2/2 lost dentures. PO 50-75% this AM with " daughter assisting at bedside.   3. Labs (6/25): Na 130, K 3.4, Glucose 160, Phos 2.3, Mg 1.8.   4. Meds: Lasix, SSI, phosphorus, potassium chloride, senna-docusate daily.   5. Diabetes management: Last A1c was 7.2% on 4/2/21, POC glucose 129-200 x 24 hrs. - Given pt's poor PO and on target A1c recommend liberalizing diet to provide pt with more options.   6. + BM 6/25.   7. Per chart review, wt has fluctuated between 122-138# in the last 3 months likely 2/2 fluids. Current wt likely up d/t bed scale error.   8. Unable to obtain consent for nutrition-focused physical exam 2/2 AMS however observed moderate muscle wasting (clavicle).     Malnutrition Risk: at risk d/t moderate muscle wasting and declining PO but unable to meet criteria at this time.     Recommendations/Plan:  1. Add chocolate Boost GC at breakfast, Magic Cup BID between meals. Monitor blood sugar, adjust supplements prn.   2. 1:1 supervision by nursing during meals + snacks when family not at bedside.   3. Encourage intake of meals, supplements.   4. Document intake of all meals and supplements as % taken in ADL's to provide interdisciplinary communication across all shifts.   5. Monitor weight.  6. Nutrition rep will continue to see patient for ongoing meal and snack preferences.     RD Following.

## 2021-06-26 NOTE — DISCHARGE PLANNING
Agency/Facility Name: [facility]  Spoke To: Mack  Outcome: Mack states that portable o2 tank was delivered to bedside yesterday. Home o2 setup is scheduled for today upon d/c    Agency/Facility Name: [facility]  Spoke To: Caitlin  Outcome: Informed Caitlin that o2 tank is not at bedside. Caitlin stated she would have a  bring a tank to bedside for pt shortly

## 2021-06-26 NOTE — PROGRESS NOTES
Family expressed frustration with and to RN regarding missing dentures, saying diet should have been changed to soft/ easy to chew earlier, and that RN did not notice missing dentures while on the phone with family first thing this am, and that the reason the daughter called was to ensure the patient had her dentures in for breakfast.. RN explained patient had indicated that she had them in, and apologized for not checking. RN expressed understanding for the trouble the missing dentures creates, both for the patient, the family, and agreed with concerns family expressed regarding nutrition and maintaining a diet without patient being able to chew. Family again expressed frustration that RN did not visually check, as patient is 89 with dementia. This RN apologized and escalated family's concerns with charge RN again.     Charge RN spoke to son regarding the dentures, informing them that dietary had been notified of missing dentures but that if they had been left on a tray from the night before they may be difficult to locate. Charge RN told son that this would be escalated to supervisor tomorrow during day shift.     Again, diet order was previously adjusted, nutrition consult placed per protocol, family encouraged to bring food from home. CNA was at bedside to assist patient in feeding for lunch.

## 2021-06-26 NOTE — ASSESSMENT & PLAN NOTE
Avoid benzodiazepines and anticholinergics  Frequent orientation  Avoid early morning labs  Avoid vital signs during sleep  Ambulate if possible  Oxycodone discontinued  Check CT of the head

## 2021-06-26 NOTE — DISCHARGE PLANNING
Anticipated Discharge Disposition: Home with [facility].    Action: MD informed LSW, patient's daughter is indicating [facility] requested a MD order for a specific oxygen tank.    LSW met with patient's daughter Luz Maria. Per Luz Maria, the traditional oxygen tank is not convenient to carry in this patient's wheelchair.  Luz Maria reported [facility] suggested the doctor orders a Conserving Size Tank. LSW explained insurance limitation for specialty equipment, MD notified via Volte.    Per DPA, [facility] is not able to provide the Conserving Size Tank as it does not provide continuous flow; therefor, this patient will need to follow up with her PCP who can order it later, if appropriate, LSW notified Luz Maria via phone.    Per DPA, [facility] accepted. [facility] will deliver the traditional portable oxygen tank to this patient's room today.    Barriers to Discharge: Medical Clearance. Portable Oxygen Tank.    Plan: As Above.

## 2021-06-26 NOTE — PROGRESS NOTES
[facility]    [address] [address]    Phone:  [phone]    Fax:  [phone]       Thank You for choosing us for your health care visit. We are glad to serve you and happy to provide you with this summary of your visit. Please help us to ensure we have accurate records. If you find anything that needs to be changed, please let our staff know as soon as possible.          Your Demographic Information     Patient Name Sex Hans Goldman Male 1961       Ethnic Group Patient Race    Not of  or  Origin Black/      Your Visit Details     Date & Time Provider Department    2017 12:40 PM John Graham MD [facility]      Your Upcoming Appointment*(Max 10)     Tuesday May 30, 2017  2:00 PM CDT   Initial Visit Therapy with Finesse Glover, PT   [facility] ([facility])    [address] [facility] [address] [phone]            2017  1:15 PM CDT   Follow-Up Therapy with Finesse Glover, PT   [facility] ([facility])    [address] [address] [facility] [address] [phone]            2017  1:45 PM CDT   Follow-Up Therapy with John Kelly, PT   [facility] ([facility])    [address] [address] [phone]            2017  1:15 PM CDT   Follow-Up Therapy with Finesse Glover, PT   [facility] ([facility])    [address] [address] [phone]            Thursday Nanci 15, 2017  1:00 PM CDT   Follow-Up Therapy with Finesse Glover, PT   [facility] ([facility])    Hospital Medicine Daily Progress Note    Date of Service  6/26/2021    Chief Complaint  89 y.o. female admitted 6/20/2021 with proctitis.    Hospital Course  Admitted with Sepsis possibly secondary to Proctitis which was noted on CT scan of the abdomen and pelvis. There was also consideration for possible pneumonia.  She was covered empirically with IV Unasyn.  Gastroenterology was consulted on the case, patient underwent flexible sigmoidoscopy on 6/22/2021 which showed slightly edematous rectal mucosa without ulceration status post biopsy, 2 diminutive sigmoid polyp removed, Left-sided sigmoid colon biopsy, Solid stool in the rectal vault, removed.  She also has known history of paroxysmal atrial fibrillation, congestive heart failure, and appeared to be volume overloaded. She was started on diuresis with IV Lasix. She also has known history of Urinary retention, and Gomes catheter has been placed.  Pathology results did show confirmation of proctitis.  She responded well to the IV Unasyn and her WBC has trended down.  With IV Lasix diuresis, multiple electrolytes were also being replaced.     Interval Problem Update  Proctitis - pathology consistent with proctitis  Resp failure - O2 2 lpm NC  HTN - sbp 130-150  Diabetes - -200  Afib - currently sinus  CHF - EF 30%  Low magnesium    Updates given and plan of care discussed with patient's daughter who was at bedside.  Lengthy explanation and discussion with regards to patient's multiple medical problems, comorbidities, frailty, and high probability of readmissions.  It also appears that patient may have lost her dentures somewhere between Thursday night and Friday morning.    Consultants/Specialty  Gastroenterology    Code Status  DNAR/DNI    Disposition  Home health, Home O2, DME    Review of Systems  Review of Systems   Constitutional: Negative for chills, diaphoresis, fever and malaise/fatigue.   HENT: Negative for congestion, hearing loss and sore throat.     Eyes: Negative for blurred vision.   Respiratory: Negative for cough, shortness of breath and wheezing.    Cardiovascular: Negative for chest pain, palpitations and leg swelling.   Gastrointestinal: Negative for abdominal pain, diarrhea, heartburn, nausea and vomiting.   Genitourinary: Negative for dysuria, flank pain and hematuria.   Musculoskeletal: Negative for back pain, joint pain, myalgias and neck pain.   Skin: Negative for rash.   Neurological: Positive for weakness. Negative for dizziness, sensory change, speech change, focal weakness and headaches.   Psychiatric/Behavioral: The patient is not nervous/anxious.         Physical Exam  Temp:  [36.1 °C (97 °F)-36.7 °C (98.1 °F)] 36.7 °C (98 °F)  Pulse:  [83-94] 88  Resp:  [14-18] 14  BP: (138-154)/(68-77) 145/70  SpO2:  [94 %-99 %] 98 %    Physical Exam  Vitals and nursing note reviewed.   HENT:      Head: Normocephalic and atraumatic.      Nose: No congestion.      Mouth/Throat:      Mouth: Mucous membranes are moist.   Eyes:      Conjunctiva/sclera: Conjunctivae normal.      Pupils: Pupils are equal, round, and reactive to light.   Cardiovascular:      Rate and Rhythm: Normal rate and regular rhythm.      Heart sounds: Murmur heard.     Pulmonary:      Effort: Pulmonary effort is normal.      Breath sounds: Rales present.   Abdominal:      General: There is distension.      Tenderness: There is no abdominal tenderness. There is no guarding or rebound.   Genitourinary:     Comments: Gomes in place   Musculoskeletal:         General: No swelling.      Cervical back: No tenderness.      Right knee: No tenderness.      Right lower leg: No edema.      Left lower leg: No edema.   Neurological:      General: No focal deficit present.      Mental Status: She is alert. She is disoriented.      Cranial Nerves: No cranial nerve deficit.   Psychiatric:         Speech: Speech is tangential.         Cognition and Memory: Cognition is impaired. Memory is impaired.  [address] [facility] [address]   [phone]            Tuesday June 20, 2017  1:00 PM CDT   Follow-Up Therapy with Finesse Glover, PT   [facility] ([facility])    [address] [address] [phone]            Thursday June 22, 2017  1:00 PM CDT   Follow-Up Therapy with Finesse Glover, PT   [facility] ([facility])    [address] [facility] [address] [phone]            Tuesday June 27, 2017  3:00 PM CDT   Office Visit with John Graham MD   [place] [place] [facility] ([facility])    [address]  [place] [address] [phone]            Wednesday June 28, 2017 11:45 AM CDT   Follow-up Visit with Sridhar Flores MD   [facility] ([facility])    [address]  [place] [id number] [phone]            Wednesday July 19, 2017  3:00 PM CDT   Office Visit with John Graham MD   [place] [place] [address] ([facility])    [address]  [place] [id number] [phone]              Your Vitals Were     BP Pulse Temp Height Weight SpO2    118/66 (BP Location: RUST, Patient Position: Sitting, Cuff Size: Regular) 78 97.2 °F (36.2 °C) (Tympanic) 5' 9\" (1.753 m) 177 lb 8 oz (80.5 kg) 95%    BMI Smoking Status                26.21 kg/m2 Current Every Day Smoker          Medications Prescribed or Re-Ordered Today     None      Your Current Medications Are        Disp Refills Start End    furosemide (LASIX) 20 MG tablet 30 tablet 0 5/16/2017     Sig - Route: Take 1 tablet by mouth 2 times daily. - Oral    Class: Eprescribe    predniSONE (DELTASONE) 50 MG tablet 5 tablet 0 5/5/2017     Sig - Route: Take 1 tablet by mouth daily. - Oral    Class: Eprescribe    diclofenac (VOLTAREN) 1 % gel 300 g 2 4/28/2017     Sig - Route: Apply 2 g          Fluids    Intake/Output Summary (Last 24 hours) at 6/26/2021 1356  Last data filed at 6/26/2021 0729  Gross per 24 hour   Intake 240 ml   Output 2950 ml   Net -2710 ml       Laboratory  Recent Labs     06/24/21  1052 06/25/21  0745 06/26/21  1134   WBC 6.8 7.3 9.5   RBC 3.55* 3.52* 3.50*   HEMOGLOBIN 9.5* 9.4* 9.4*   HEMATOCRIT 30.4* 31.0* 29.5*   MCV 85.6 88.1 84.3   MCH 26.8* 26.7* 26.9*   MCHC 31.3* 30.3* 31.9*   RDW 64.3* 65.1* 62.4*   PLATELETCT 118* 192 178   MPV 10.6 10.6 11.5     Recent Labs     06/24/21  0848 06/25/21  0745 06/26/21  1134   SODIUM 132* 130* 132*   POTASSIUM 3.4* 3.4* 3.7   CHLORIDE 100 97 97   CO2 25 25 26   GLUCOSE 148* 160* 204*   BUN 16 15 12   CREATININE 0.64 0.57 0.58   CALCIUM 7.8* 8.4* 8.2*                   Imaging  EC-ECHOCARDIOGRAM COMPLETE W/ CONT   Final Result      US-EXTREMITY VENOUS LOWER BILAT   Final Result      CT-ABDOMEN-PELVIS WITH   Final Result      1.  Wall thickening of the rectum is noted and there is induration around the perirectal space. Findings could be due to proctitis.      2.  Small right pleural effusion is slightly larger than on prior exam.      3.  Consolidation and volume loss is again noted in each lung base.      4.  Cardiomegaly is again noted.         CT-HEAD W/O   Final Result         NO ACUTE ABNORMALITIES ARE NOTED ON CT SCAN OF THE HEAD.      Findings are consistent with atrophy.  Decreased attenuation in the periventricular white matter likely indicates microvascular ischemic disease.      DX-CHEST-PORTABLE (1 VIEW)   Final Result         Ill-defined opacifications in each lung have increased compared to the prior radiograph.  This could indicate worsening of pulmonary edema or inflammation.           Assessment/Plan  * Sepsis (HCC)- (present on admission)  Assessment & Plan  This is Sepsis Present on admission  SIRS criteria identified on my evaluation include: Fever, with temperature greater than 101 deg F, Tachypnea, with  topically 4 times daily as needed (back pain). - Topical    Class: Eprescribe    ciprofloxacin (CIPRO) 500 MG tablet 84 tablet 0 4/26/2017     Sig - Route: Take 1 tablet by mouth 2 times daily. - Oral    Class: Eprescribe    traMADol (ULTRAM) 50 MG tablet 30 tablet 0 4/25/2017     Sig - Route: Take 1 tablet by mouth every 8 hours as needed for Pain. - Oral    metFORMIN (GLUCOPHAGE) 500 MG tablet 60 tablet 0 4/20/2017     Sig - Route: Take 1 tablet by mouth 2 times daily (with meals). - Oral    Class: Eprescribe    Cosign for Ordering: Accepted by John Graham MD on 4/20/2017  2:05 PM    sertraline (ZOLOFT) 50 MG tablet 30 tablet 3 4/19/2017     Sig - Route: Take 1 tablet by mouth daily. - Oral    Class: Eprescribe    omeprazole (PRILOSEC) 20 MG capsule 180 capsule 2 4/19/2017     Sig - Route: Take 1 capsule by mouth 2 times daily. - Oral    Class: Eprescribe    Vitamin D, Ergocalciferol, 83069 units capsule 8 capsule 0 4/17/2017     Sig - Route: Take 1 capsule by mouth once a week. - Oral    Class: Eprescribe    levETIRAcetam (KEPPRA) 500 MG tablet        Sig - Route: Take 500 mg by mouth 2 times daily. - Oral    Class: Historical Med    busPIRone (BUSPAR) 10 MG tablet        Sig - Route: Take 10 mg by mouth 2 times daily. - Oral    Class: Historical Med    tamsulosin (FLOMAX) 0.4 MG Cap 30 capsule 6 10/17/2016     Sig: TAKE 1 CAPSULE BY MOUTH DAILY AFTER DINNER    Class: Eprescribe    ferrous sulfate 325 (65 FE) MG tablet 90 tablet 2 8/3/2015     Sig - Route: Take 1 tablet by mouth 3 times daily (with meals). Take with orange juice - Oral    Class: Eprescribe    Cosign for Ordering: Accepted by Roxana Smith DO on 8/3/2015  2:53 PM    sildenafil (VIAGRA) 100 MG tablet 60 tablet 0 1/30/2015     Sig - Route: Take 1 tablet by mouth as needed for Erectile Dysfunction. - Oral    QUEtiapine (SEROQUEL) 100 MG tablet        Sig - Route: Take 100 mg by mouth nightly. - Oral    Class: Historical Med       Allergies     Cyclobenzaprine RASH    Zanaflex [Tizanidine Hcl] RASH      Immunizations History as of 5/18/2017     Name Date    Hepatitis A - Adult 3/23/2011    Hepatitis B Adult 3/23/2011, 10/19/2005    Influenza 9/7/2012    MMR 12/15/1971    Pneumococcal Polysaccharide Adult 10/28/2014    Polio, ORAL 8/25/1971    Tdap 10/28/2014, 10/25/2005, 8/25/1971      Problem List as of 5/18/2017     oxycodone.morphine contract    Anxiety    Seizure disorder (CMS/HCC)    GERD (gastroesophageal reflux disease)    Prostatitis    Depression    Absolute anemia    Chronic back pain              Patient Instructions    Information Dr. Graham wants you to know:    Your opinion matters! Thank you for choosing Dr.Michael Graham at [facility].     In the next few weeks you may receive a [employer] survey regarding your most recent clinic visit with us.  Please take a few moments out of your day to accurately evaluate your visit.  We strive to provide you with the best medical care and hope you are happy with our services 100% of the time.  We consider any rating lower than a 9/10 unacceptable. If you believe you would rate our clinic less than this please let us know how we can improve.  Again, thank you for your time and we look forward to your next visit.       Clinic Policy:  Cancellation and No Show: If you must cancel a visit, please give 24 hours notice so we can accommodate other patients waiting to be seen. As a courtesy our automated system will place a reminder call to you 48 hours prior to your appointment time. Any appointment cancelled less than 2 hours prior to start time will be considered a “No Show”. Any patient arriving 10 minutes after their scheduled appointment may need to re-schedule their appointment and be considered a \"No Show\".      You can cancel your appointment by calling our office at [phone] during normal business hours of 7:30 am to 5:00 pm or online via your my [employer]  respirations greater than 20 per minute and Leukocytosis, with WBC greater than 12,000  Source - Pneumonia vs Proctitis  Fluid resuscitation ordered per protocol  IV antibiotics as appropriate for source of sepsis  While organ dysfunction may be noted elsewhere in this problem list or in the chart, degree of organ dysfunction does not meet CMS criteria for severe sepsis        Frailty syndrome in geriatric patient- (present on admission)  Assessment & Plan  PT and OT    Encephalopathy- (present on admission)  Assessment & Plan  Avoid benzodiazepines and anticholinergics  Frequent orientation  Avoid early morning labs  Avoid vital signs during sleep  Ambulate if possible    Proctitis- (present on admission)  Assessment & Plan  Omnicef and Flagyl to complete course  Bowel protocol    Pulmonary HTN (HCC)- (present on admission)  Assessment & Plan  IV Lasix to diurese    Tricuspid regurgitation- (present on admission)  Assessment & Plan  moderate to severe    Hypophosphatemia- (present on admission)  Assessment & Plan  Stop Kphos  Follow level    Hypomagnesemia- (present on admission)  Assessment & Plan  IV Mg 2 g  Follow level    Hypokalemia- (present on admission)  Assessment & Plan  Kdur  Follow bmp    Thrombocytopenia (HCC)- (present on admission)  Assessment & Plan  Follow cbc    Normocytic anemia- (present on admission)  Assessment & Plan  Follow cbc    Chronic respiratory failure with hypoxia (HCC)- (present on admission)  Assessment & Plan  RT protocol    Chronic systolic heart failure (HCC)- (present on admission)  Assessment & Plan  Coreg, Losartan  IV Lasix to diurese  Fluid restriction 1.2 L per day    Urinary retention- (present on admission)  Assessment & Plan  Gomes in place  Follow-up with Urology as outpatient - scheduled for 7/6/2021    Dementia (HCC)- (present on admission)  Assessment & Plan  Avoid benzodiazepines and anticholinergics  Frequent orientation  Avoid early morning labs  Avoid vital signs  account.      Forms (FMLA/ Disability):  Please complete your portion of any forms prior to bringing them into the office. This includes adding a telephone number where you can be reached during normal business hours. Please allow 7-14 days for any form to be completed. Some FMLA forms will need appointment to be completed.     Medication Requests:  Please plan ahead. We need up to 2 business days notice for refills to be completed. Please contact your preferred pharmacy for your refills. The pharmacy will contact the office for the refills.     Messages:  Messages left for Dr.Michael Graham will be returned within 24 business hours or sooner.     Test results:  Our goal is to report all test results ordered by Dr. John Graham within 7-14 days. If you do not receive your test results either by phone or [employer] message within 14 days after your visit with our office, please call our office at [phone] and ask to speak with a member of our care team or send your care team a message via your [employer] account.     [employer] Registration:  If you are not registered for a [employer] account, please ask your  to send you the link via e-mail or you can call [phone] and receive registration information.     Office Hours for Dr. Graham:  Monday: 9am- 6pm  Tuesday-Thursday: 8am-5pm  Friday: 7am-11am    Your Opinion Matters To Us  If you receive a patient satisfaction survey in the mail, please complete and return it in the postage-paid envelope.    We truly value and appreciate your feedback.                during sleep  Ambulate if possible    Hyponatremia- (present on admission)  Assessment & Plan  Follow bmp    Paroxysmal A-fib (HCC)- (present on admission)  Assessment & Plan  Coreg, Eliquis    Hypertension- (present on admission)  Assessment & Plan  Losartan, Coreg     Type 2 diabetes mellitus with hyperglycemia, without long-term current use of insulin (HCC)- (present on admission)  Assessment & Plan  SSI       VTE prophylaxis: Eliquis

## 2021-06-26 NOTE — PROGRESS NOTES
Received report from previous shift RN  Assessment complete.  A&O x 2, pt oriented to self and location.  Patient ambulates with x2 assist. Bed alarm on.   Patient has 6/10 pain. Pain managed with prescribed medications.  Denies N&V. Tolerating GI soft, minced and moist diet.  Gomes in place, + flatus, 6/25 BM.  Patient denies SOB.    Review plan with of care with patient. Call light and personal belongings with in reach. Hourly rounding in place. All needs met at this time.    2 RN Skin Note with RENEE Mederos  Skin behind ears pink and intact  Bruising on back of hands bilaterally  Red sacrum, blanching, skin intact  Heels red, skin intact and blanching  Bilateral lower extremity 2+ edema, skin red/pink   Skin over all other bony prominences pink and intact

## 2021-06-26 NOTE — DISCHARGE PLANNING
Anticipated Discharge Disposition: Home with HHC and Home Oxygen.    Action: Per MD, this patient's daughter is claiming the DME company is requesting an order for a portable device. LSW requested DPA follows up with [facility] and [facility] for a update on this referral.    Barriers to Discharge: Acceptance for HHC and Home Oxygen.    Plan: As Above.

## 2021-06-26 NOTE — PROGRESS NOTES
Pt with poor PO intake due to missing dentures, difficult diet with GI soft, consistent carbs, and fluid restriction. Consult ordered per protocol and family encouraged to bring in foods from home that fit with diet. Family demonstrates excellent understanding of dietary restrictions for patient, teaching back eduction provided from previous providers as well.

## 2021-06-26 NOTE — CARE PLAN
Problem: Nutritional:  Goal: Achieve adequate nutritional intake  Description: Patient will consume > 50% of meals  6/26/2021 1142 by Sanam Goins R.D.  Outcome: Progressing

## 2021-06-26 NOTE — CARE PLAN
Problem: Knowledge Deficit - Standard  Goal: Patient and family/care givers will demonstrate understanding of plan of care, disease process/condition, diagnostic tests and medications  Outcome: Progressing     Problem: Hemodynamics  Goal: Patient's hemodynamics, fluid balance and neurologic status will be stable or improve  Outcome: Progressing     Problem: Fluid Volume  Goal: Fluid volume balance will be maintained  Outcome: Progressing     Problem: Urinary - Renal Perfusion  Goal: Ability to achieve and maintain adequate renal perfusion and functioning will improve  Outcome: Progressing     Problem: Respiratory  Goal: Patient will achieve/maintain optimum respiratory ventilation and gas exchange  Outcome: Progressing     Problem: Mechanical Ventilation  Goal: Safe management of artificial airway and ventilation  Outcome: Progressing  Goal: Successful weaning off mechanical ventilator, spontaneously maintains adequate gas exchange  Outcome: Progressing  Goal: Patient will be able to express needs and understand communication  Outcome: Progressing     Problem: Physical Regulation  Goal: Diagnostic test results will improve  Outcome: Progressing  Goal: Signs and symptoms of infection will decrease  Outcome: Progressing     Problem: Skin Integrity  Goal: Skin integrity is maintained or improved  Outcome: Progressing     Problem: Fall Risk  Goal: Patient will remain free from falls  Outcome: Progressing     Problem: Pain - Standard  Goal: Alleviation of pain or a reduction in pain to the patient’s comfort goal  Outcome: Progressing   The patient is Stable - Low risk of patient condition declining or worsening    Shift Goals  Clinical Goals: Reorient, pain control  Patient Goals: Reorient, pain control    Progress made toward(s) clinical / shift goals:  yes    Patient is not progressing towards the following goals:

## 2021-06-27 LAB
ANION GAP SERPL CALC-SCNC: 8 MMOL/L (ref 7–16)
BASOPHILS # BLD AUTO: 0.2 % (ref 0–1.8)
BASOPHILS # BLD: 0.02 K/UL (ref 0–0.12)
BUN SERPL-MCNC: 13 MG/DL (ref 8–22)
CALCIUM SERPL-MCNC: 8.6 MG/DL (ref 8.5–10.5)
CHLORIDE SERPL-SCNC: 95 MMOL/L (ref 96–112)
CO2 SERPL-SCNC: 29 MMOL/L (ref 20–33)
COMMENT 1642: NORMAL
CREAT SERPL-MCNC: 0.56 MG/DL (ref 0.5–1.4)
EOSINOPHIL # BLD AUTO: 0.03 K/UL (ref 0–0.51)
EOSINOPHIL NFR BLD: 0.3 % (ref 0–6.9)
ERYTHROCYTE [DISTWIDTH] IN BLOOD BY AUTOMATED COUNT: 60.3 FL (ref 35.9–50)
GLUCOSE BLD-MCNC: 148 MG/DL (ref 65–99)
GLUCOSE BLD-MCNC: 176 MG/DL (ref 65–99)
GLUCOSE BLD-MCNC: 200 MG/DL (ref 65–99)
GLUCOSE BLD-MCNC: 216 MG/DL (ref 65–99)
GLUCOSE SERPL-MCNC: 159 MG/DL (ref 65–99)
HCT VFR BLD AUTO: 28.6 % (ref 37–47)
HGB BLD-MCNC: 9.3 G/DL (ref 12–16)
IMM GRANULOCYTES # BLD AUTO: 0.1 K/UL (ref 0–0.11)
IMM GRANULOCYTES NFR BLD AUTO: 0.9 % (ref 0–0.9)
LYMPHOCYTES # BLD AUTO: 0.52 K/UL (ref 1–4.8)
LYMPHOCYTES NFR BLD: 4.9 % (ref 22–41)
MAGNESIUM SERPL-MCNC: 1.5 MG/DL (ref 1.5–2.5)
MCH RBC QN AUTO: 27.2 PG (ref 27–33)
MCHC RBC AUTO-ENTMCNC: 32.5 G/DL (ref 33.6–35)
MCV RBC AUTO: 83.6 FL (ref 81.4–97.8)
MONOCYTES # BLD AUTO: 0.82 K/UL (ref 0–0.85)
MONOCYTES NFR BLD AUTO: 7.7 % (ref 0–13.4)
MORPHOLOGY BLD-IMP: NORMAL
NEUTROPHILS # BLD AUTO: 9.22 K/UL (ref 2–7.15)
NEUTROPHILS NFR BLD: 86 % (ref 44–72)
NRBC # BLD AUTO: 0 K/UL
NRBC BLD-RTO: 0 /100 WBC
NT-PROBNP SERPL IA-MCNC: ABNORMAL PG/ML (ref 0–125)
PHOSPHATE SERPL-MCNC: 3.4 MG/DL (ref 2.5–4.5)
PLATELET # BLD AUTO: ABNORMAL K/UL (ref 164–446)
PMV BLD AUTO: ABNORMAL FL (ref 9–12.9)
POTASSIUM SERPL-SCNC: 3.4 MMOL/L (ref 3.6–5.5)
RBC # BLD AUTO: 3.42 M/UL (ref 4.2–5.4)
SODIUM SERPL-SCNC: 132 MMOL/L (ref 135–145)
WBC # BLD AUTO: 10.7 K/UL (ref 4.8–10.8)

## 2021-06-27 PROCEDURE — 82962 GLUCOSE BLOOD TEST: CPT | Mod: 91

## 2021-06-27 PROCEDURE — 80048 BASIC METABOLIC PNL TOTAL CA: CPT

## 2021-06-27 PROCEDURE — 770006 HCHG ROOM/CARE - MED/SURG/GYN SEMI*

## 2021-06-27 PROCEDURE — 85025 COMPLETE CBC W/AUTO DIFF WBC: CPT

## 2021-06-27 PROCEDURE — 700111 HCHG RX REV CODE 636 W/ 250 OVERRIDE (IP): Performed by: FAMILY MEDICINE

## 2021-06-27 PROCEDURE — A9270 NON-COVERED ITEM OR SERVICE: HCPCS | Performed by: HOSPITALIST

## 2021-06-27 PROCEDURE — 83735 ASSAY OF MAGNESIUM: CPT

## 2021-06-27 PROCEDURE — 700111 HCHG RX REV CODE 636 W/ 250 OVERRIDE (IP): Performed by: STUDENT IN AN ORGANIZED HEALTH CARE EDUCATION/TRAINING PROGRAM

## 2021-06-27 PROCEDURE — 700102 HCHG RX REV CODE 250 W/ 637 OVERRIDE(OP): Performed by: FAMILY MEDICINE

## 2021-06-27 PROCEDURE — A9270 NON-COVERED ITEM OR SERVICE: HCPCS | Performed by: FAMILY MEDICINE

## 2021-06-27 PROCEDURE — 99232 SBSQ HOSP IP/OBS MODERATE 35: CPT | Performed by: FAMILY MEDICINE

## 2021-06-27 PROCEDURE — 84100 ASSAY OF PHOSPHORUS: CPT

## 2021-06-27 PROCEDURE — 83880 ASSAY OF NATRIURETIC PEPTIDE: CPT

## 2021-06-27 PROCEDURE — 700111 HCHG RX REV CODE 636 W/ 250 OVERRIDE (IP)

## 2021-06-27 PROCEDURE — 700102 HCHG RX REV CODE 250 W/ 637 OVERRIDE(OP): Performed by: HOSPITALIST

## 2021-06-27 PROCEDURE — 36415 COLL VENOUS BLD VENIPUNCTURE: CPT

## 2021-06-27 RX ORDER — NALOXONE HYDROCHLORIDE 0.4 MG/ML
INJECTION, SOLUTION INTRAMUSCULAR; INTRAVENOUS; SUBCUTANEOUS
Status: COMPLETED
Start: 2021-06-27 | End: 2021-06-27

## 2021-06-27 RX ORDER — NALOXONE HYDROCHLORIDE 0.4 MG/ML
1 INJECTION, SOLUTION INTRAMUSCULAR; INTRAVENOUS; SUBCUTANEOUS ONCE
Status: COMPLETED | OUTPATIENT
Start: 2021-06-27 | End: 2021-06-27

## 2021-06-27 RX ORDER — NALOXONE HYDROCHLORIDE 1 MG/ML
1 INJECTION INTRAMUSCULAR; INTRAVENOUS; SUBCUTANEOUS ONCE
Status: DISPENSED | OUTPATIENT
Start: 2021-06-27 | End: 2021-06-28

## 2021-06-27 RX ORDER — MAGNESIUM SULFATE HEPTAHYDRATE 40 MG/ML
2 INJECTION, SOLUTION INTRAVENOUS ONCE
Status: COMPLETED | OUTPATIENT
Start: 2021-06-27 | End: 2021-06-27

## 2021-06-27 RX ADMIN — CEFDINIR 300 MG: 300 CAPSULE ORAL at 17:20

## 2021-06-27 RX ADMIN — FUROSEMIDE 40 MG: 10 INJECTION, SOLUTION INTRAMUSCULAR; INTRAVENOUS at 15:44

## 2021-06-27 RX ADMIN — NALOXONE HYDROCHLORIDE 0.4 MG: 0.4 INJECTION, SOLUTION INTRAMUSCULAR; INTRAVENOUS; SUBCUTANEOUS at 22:57

## 2021-06-27 RX ADMIN — INSULIN LISPRO 2 UNITS: 100 INJECTION, SOLUTION INTRAVENOUS; SUBCUTANEOUS at 21:00

## 2021-06-27 RX ADMIN — METRONIDAZOLE 500 MG: 500 TABLET ORAL at 20:54

## 2021-06-27 RX ADMIN — FUROSEMIDE 40 MG: 10 INJECTION, SOLUTION INTRAMUSCULAR; INTRAVENOUS at 05:15

## 2021-06-27 RX ADMIN — NALOXONE HYDROCHLORIDE 1 MG: 0.4 INJECTION, SOLUTION INTRAMUSCULAR; INTRAVENOUS; SUBCUTANEOUS at 23:42

## 2021-06-27 RX ADMIN — METRONIDAZOLE 500 MG: 500 TABLET ORAL at 05:13

## 2021-06-27 RX ADMIN — ATORVASTATIN CALCIUM 20 MG: 20 TABLET, FILM COATED ORAL at 20:54

## 2021-06-27 RX ADMIN — APIXABAN 2.5 MG: 5 TABLET, FILM COATED ORAL at 17:20

## 2021-06-27 RX ADMIN — POTASSIUM CHLORIDE 20 MEQ: 1500 TABLET, EXTENDED RELEASE ORAL at 17:20

## 2021-06-27 RX ADMIN — NALOXONE HYDROCHLORIDE 0.2 MG: 0.4 INJECTION, SOLUTION INTRAMUSCULAR; INTRAVENOUS; SUBCUTANEOUS at 23:04

## 2021-06-27 RX ADMIN — POTASSIUM CHLORIDE 20 MEQ: 1500 TABLET, EXTENDED RELEASE ORAL at 05:14

## 2021-06-27 RX ADMIN — APIXABAN 2.5 MG: 5 TABLET, FILM COATED ORAL at 05:14

## 2021-06-27 RX ADMIN — DOCUSATE SODIUM 50 MG AND SENNOSIDES 8.6 MG 2 TABLET: 8.6; 5 TABLET, FILM COATED ORAL at 05:14

## 2021-06-27 RX ADMIN — CARVEDILOL 3.12 MG: 6.25 TABLET, FILM COATED ORAL at 08:31

## 2021-06-27 RX ADMIN — METRONIDAZOLE 500 MG: 500 TABLET ORAL at 14:11

## 2021-06-27 RX ADMIN — NALOXONE HYDROCHLORIDE 0.4 MG: 0.4 INJECTION, SOLUTION INTRAMUSCULAR; INTRAVENOUS; SUBCUTANEOUS at 23:00

## 2021-06-27 RX ADMIN — LOSARTAN POTASSIUM 25 MG: 50 TABLET, FILM COATED ORAL at 05:13

## 2021-06-27 RX ADMIN — POLYETHYLENE GLYCOL 3350 1 PACKET: 17 POWDER, FOR SOLUTION ORAL at 05:10

## 2021-06-27 RX ADMIN — CEFDINIR 300 MG: 300 CAPSULE ORAL at 05:15

## 2021-06-27 RX ADMIN — MAGNESIUM SULFATE 2 G: 2 INJECTION INTRAVENOUS at 11:55

## 2021-06-27 ASSESSMENT — ENCOUNTER SYMPTOMS
HEARTBURN: 0
SHORTNESS OF BREATH: 0
SENSORY CHANGE: 0
WEAKNESS: 1
DIAPHORESIS: 0
WHEEZING: 0
FLANK PAIN: 0
FOCAL WEAKNESS: 0
VOMITING: 0
SPEECH CHANGE: 0
DIZZINESS: 0
BACK PAIN: 0
FEVER: 0
SORE THROAT: 0
ABDOMINAL PAIN: 0
NAUSEA: 0
CHILLS: 0
NERVOUS/ANXIOUS: 0
DIARRHEA: 0
PALPITATIONS: 0
NECK PAIN: 0
HEADACHES: 0
BLURRED VISION: 0
COUGH: 0
MYALGIAS: 0

## 2021-06-27 ASSESSMENT — PAIN DESCRIPTION - PAIN TYPE
TYPE: ACUTE PAIN

## 2021-06-27 ASSESSMENT — FIBROSIS 4 INDEX: FIB4 SCORE: 3.78

## 2021-06-27 NOTE — PROGRESS NOTES
Received report from previous shift RN  Assessment complete.  A&O x 2. Oriented to self and place.  Patient ambulates with x2 assist. Bed alarm on.   Patient has 7/10 pain. Pain managed with prescribed medications.  Denies N&V. Tolerating soft and bite sized diet.  + void, + flatus, 6/25 BM.  Patient denies SOB.  SCD's on.    Review plan with of care with patient. Call light and personal belongings with in reach. Hourly rounding in place. All needs met at this time.    2 RN Skin Note  Skin behind ears pink and intact  Bruising on back of hands bilaterally  Red sacrum, blanching, skin intact  Heels red, skin intact and blanching  Bilateral lower extremity 2+ edema, skin red/pink   Skin over all other bony prominences pink and intact

## 2021-06-27 NOTE — PROGRESS NOTES
[facility] Medicine Daily Progress Note    Date of Service  6/27/2021    Chief Complaint  89 y.o. female admitted 6/20/2021 with proctitis.    Hospital Course  Admitted with Sepsis possibly secondary to Proctitis which was noted on CT scan of the abdomen and pelvis. There was also consideration for possible pneumonia.  She was covered empirically with IV Unasyn.  Gastroenterology was consulted on the case, patient underwent flexible sigmoidoscopy on 6/22/2021 which showed slightly edematous rectal mucosa without ulceration status post biopsy, 2 diminutive sigmoid polyp removed, Left-sided sigmoid colon biopsy, Solid stool in the rectal vault, removed.  She also has known history of paroxysmal atrial fibrillation, congestive heart failure, and appeared to be volume overloaded. She was started on diuresis with IV Lasix. She also has known history of Urinary retention, and Gomes catheter has been placed.  Pathology results did show confirmation of proctitis.  She responded well to the IV Unasyn and her WBC has trended down.  With IV Lasix diuresis, multiple electrolytes were also being replaced.     Interval Problem Update  Proctitis - pathology consistent with proctitis  Resp failure - O2 2 lpm NC  HTN - sbp 130-140  Diabetes - -170  Afib - currently sinus  CHF - EF 30%  Low magnesium, potassium    Updates given and plan of care discussed with patient's daughter who was at bedside.    Consultants/Specialty  Gastroenterology    Code Status  DNAR/DNI    Disposition  Home health, Home O2, DME    Review of Systems  Review of Systems   Constitutional: Negative for chills, diaphoresis, fever and malaise/fatigue.   HENT: Negative for congestion, hearing loss and sore throat.    Eyes: Negative for blurred vision.   Respiratory: Negative for cough, shortness of breath and wheezing.    Cardiovascular: Negative for chest pain, palpitations and leg swelling.   Gastrointestinal: Negative for abdominal pain, diarrhea,  heartburn, nausea and vomiting.   Genitourinary: Negative for dysuria, flank pain and hematuria.   Musculoskeletal: Negative for back pain, joint pain, myalgias and neck pain.   Skin: Negative for rash.   Neurological: Positive for weakness. Negative for dizziness, sensory change, speech change, focal weakness and headaches.   Psychiatric/Behavioral: The patient is not nervous/anxious.         Physical Exam  Temp:  [36.2 °C (97.1 °F)-36.8 °C (98.3 °F)] 36.6 °C (97.9 °F)  Pulse:  [88-96] 91  Resp:  [16-20] 16  BP: (129-143)/(59-75) 129/66  SpO2:  [94 %-100 %] 95 %    Physical Exam  Vitals and nursing note reviewed.   HENT:      Head: Normocephalic and atraumatic.      Nose: No congestion.      Mouth/Throat:      Mouth: Mucous membranes are moist.   Eyes:      Conjunctiva/sclera: Conjunctivae normal.      Pupils: Pupils are equal, round, and reactive to light.   Cardiovascular:      Rate and Rhythm: Normal rate and regular rhythm.      Heart sounds: Murmur heard.     Pulmonary:      Effort: Pulmonary effort is normal.      Breath sounds: Rales present.   Abdominal:      General: There is distension.      Tenderness: There is no abdominal tenderness. There is no guarding or rebound.   Genitourinary:     Comments: Gomes in place   Musculoskeletal:         General: No swelling.      Cervical back: No tenderness.      Right knee: No tenderness.      Right lower leg: No edema.      Left lower leg: No edema.   Neurological:      General: No focal deficit present.      Mental Status: She is alert. She is disoriented.      Cranial Nerves: No cranial nerve deficit.   Psychiatric:         Speech: Speech is tangential.         Cognition and Memory: Cognition is impaired. Memory is impaired.         Fluids    Intake/Output Summary (Last 24 hours) at 6/27/2021 1250  Last data filed at 6/27/2021 0718  Gross per 24 hour   Intake 25 ml   Output 1500 ml   Net -1475 ml       Laboratory  Recent Labs     06/25/21  0745 06/26/21  1138  06/27/21  0941   WBC 7.3 9.5 10.7   RBC 3.52* 3.50* 3.42*   HEMOGLOBIN 9.4* 9.4* 9.3*   HEMATOCRIT 31.0* 29.5* 28.6*   MCV 88.1 84.3 83.6   MCH 26.7* 26.9* 27.2   MCHC 30.3* 31.9* 32.5*   RDW 65.1* 62.4* 60.3*   PLATELETCT 192 178 See Note   MPV 10.6 11.5 See Note     Recent Labs     06/25/21  0745 06/26/21  1134 06/27/21  0941   SODIUM 130* 132* 132*   POTASSIUM 3.4* 3.7 3.4*   CHLORIDE 97 97 95*   CO2 25 26 29   GLUCOSE 160* 204* 159*   BUN 15 12 13   CREATININE 0.57 0.58 0.56   CALCIUM 8.4* 8.2* 8.6                   Imaging  EC-ECHOCARDIOGRAM COMPLETE W/ CONT   Final Result      US-EXTREMITY VENOUS LOWER BILAT   Final Result      CT-ABDOMEN-PELVIS WITH   Final Result      1.  Wall thickening of the rectum is noted and there is induration around the perirectal space. Findings could be due to proctitis.      2.  Small right pleural effusion is slightly larger than on prior exam.      3.  Consolidation and volume loss is again noted in each lung base.      4.  Cardiomegaly is again noted.         CT-HEAD W/O   Final Result         NO ACUTE ABNORMALITIES ARE NOTED ON CT SCAN OF THE HEAD.      Findings are consistent with atrophy.  Decreased attenuation in the periventricular white matter likely indicates microvascular ischemic disease.      DX-CHEST-PORTABLE (1 VIEW)   Final Result         Ill-defined opacifications in each lung have increased compared to the prior radiograph.  This could indicate worsening of pulmonary edema or inflammation.           Assessment/Plan  * Sepsis (HCC)- (present on admission)  Assessment & Plan  This is Sepsis Present on admission  SIRS criteria identified on my evaluation include: Fever, with temperature greater than 101 deg F, Tachypnea, with respirations greater than 20 per minute and Leukocytosis, with WBC greater than 12,000  Source - Pneumonia vs Proctitis  Fluid resuscitation ordered per protocol  IV antibiotics as appropriate for source of sepsis  While organ dysfunction may  be noted elsewhere in this problem list or in the chart, degree of organ dysfunction does not meet CMS criteria for severe sepsis        Frailty syndrome in geriatric patient- (present on admission)  Assessment & Plan  PT and OT    Encephalopathy- (present on admission)  Assessment & Plan  Avoid benzodiazepines and anticholinergics  Frequent orientation  Avoid early morning labs  Avoid vital signs during sleep  Ambulate if possible    Proctitis- (present on admission)  Assessment & Plan  Omnicef and Flagyl to complete course  Bowel protocol    Pulmonary HTN (HCC)- (present on admission)  Assessment & Plan  IV Lasix to diurese    Tricuspid regurgitation- (present on admission)  Assessment & Plan  moderate to severe    Hypophosphatemia- (present on admission)  Assessment & Plan  Follow level    Hypomagnesemia- (present on admission)  Assessment & Plan  IV Mg 2 g  Follow level    Hypokalemia- (present on admission)  Assessment & Plan  Kdur  Follow bmp    Thrombocytopenia (HCC)- (present on admission)  Assessment & Plan  Follow cbc    Normocytic anemia- (present on admission)  Assessment & Plan  Follow cbc    Chronic respiratory failure with hypoxia (HCC)- (present on admission)  Assessment & Plan  RT protocol    Chronic systolic heart failure (HCC)- (present on admission)  Assessment & Plan  Coreg, Losartan  IV Lasix to diurese  Fluid restriction 1.2 L per day    Urinary retention- (present on admission)  Assessment & Plan  Gomes in place  Follow-up with Urology as outpatient - scheduled for 7/6/2021    Dementia (HCC)- (present on admission)  Assessment & Plan  Avoid benzodiazepines and anticholinergics  Frequent orientation  Avoid early morning labs  Avoid vital signs during sleep  Ambulate if possible    Hyponatremia- (present on admission)  Assessment & Plan  Follow bmp    Paroxysmal A-fib (HCC)- (present on admission)  Assessment & Plan  Coreg, Eliquis    Hypertension- (present on admission)  Assessment &  Plan  Losartan, Coreg     Type 2 diabetes mellitus with hyperglycemia, without long-term current use of insulin (HCC)- (present on admission)  Assessment & Plan  SSI       VTE prophylaxis: Eliquis

## 2021-06-27 NOTE — CARE PLAN
The patient is Stable - Low risk of patient condition declining or worsening    Shift Goals  Clinical Goals: pain management  Patient Goals: Reorient, pain control    Progress made toward(s) clinical / shift goals:    Problem: Skin Integrity  Goal: Skin integrity is maintained or improved  Outcome: Progressing     Problem: Fall Risk  Goal: Patient will remain free from falls  Outcome: Progressing     Problem: Pain - Standard  Goal: Alleviation of pain or a reduction in pain to the patient’s comfort goal  Outcome: Progressing       Patient is not progressing towards the following goals:

## 2021-06-27 NOTE — PROGRESS NOTES
"Assumed care of patient at 0645. Bedside report received. Assessment complete.  AA&Ox2 to Self and Place. Denies CP/SOB.  /66   Pulse 91   Temp 36.6 °C (97.9 °F) (Temporal)   Resp 16   Ht 1.6 m (5' 3\")   Wt 74.2 kg (163 lb 9.3 oz)   SpO2 95%   BMI 28.98 kg/m²   Reporting 0/10 pain. Medicated per MAR. Declined intervention at this time.  Educated patient regarding pharmacologic and non pharmacologic modalities for pain management.  2+ Edema in lower extremities  Sacrum light pink, blanching   PT not tolerating diet less than 25% consumed, dietary consult placed.  Gomes in place, urine yellow and clear. + BM. Last BM 6/26  Pt q2 turns, high fall risk   All needs met at this time. Call light within reach. Pt calls appropriately. Bed low and locked, non skid socks in place. Hourly rounding in place.    "

## 2021-06-27 NOTE — PROGRESS NOTES
2 RN skin check complete.     Devices in place: SCD's bilaterally, SpO2 finger probe, PIV line.   Skin assessed under devices     Surgical Incision - NA    Confirmed pressure ulcers found on: NA  New potential ulcers noted on: NA  Wound consult placed: NA     -Skin beneath folds checked.   -bruising to bilateral hands  -Sacrum red, blanching  -Heels red, blanching  +2 edema to BLE, skin pink/red  - All bony prominences assessed. Skin intact.     Preventative measures in place:  - turns with use of pillows to support repositioning  -heels floated on pillows    -bony prominences floated on pillows  -Q2 turns in place  -repositioning of devices   -Offered mouth swabs  -Cue for turning while in bed

## 2021-06-28 ENCOUNTER — APPOINTMENT (OUTPATIENT)
Dept: RADIOLOGY | Facility: MEDICAL CENTER | Age: 86
DRG: 871 | End: 2021-06-28
Attending: FAMILY MEDICINE
Payer: MEDICARE

## 2021-06-28 LAB
ALBUMIN SERPL BCP-MCNC: 2 G/DL (ref 3.2–4.9)
ALBUMIN/GLOB SERPL: 0.4 G/DL
ALP SERPL-CCNC: 120 U/L (ref 30–99)
ALT SERPL-CCNC: 10 U/L (ref 2–50)
ANION GAP SERPL CALC-SCNC: 9 MMOL/L (ref 7–16)
APPEARANCE UR: CLEAR
AST SERPL-CCNC: 24 U/L (ref 12–45)
BACTERIA #/AREA URNS HPF: NEGATIVE /HPF
BILIRUB SERPL-MCNC: 0.4 MG/DL (ref 0.1–1.5)
BILIRUB UR QL STRIP.AUTO: NEGATIVE
BUN SERPL-MCNC: 16 MG/DL (ref 8–22)
CALCIUM SERPL-MCNC: 8.2 MG/DL (ref 8.5–10.5)
CHLORIDE SERPL-SCNC: 96 MMOL/L (ref 96–112)
CO2 SERPL-SCNC: 28 MMOL/L (ref 20–33)
COLOR UR: YELLOW
CREAT SERPL-MCNC: 0.55 MG/DL (ref 0.5–1.4)
EPI CELLS #/AREA URNS HPF: ABNORMAL /HPF
GLOBULIN SER CALC-MCNC: 4.5 G/DL (ref 1.9–3.5)
GLUCOSE BLD-MCNC: 155 MG/DL (ref 65–99)
GLUCOSE BLD-MCNC: 156 MG/DL (ref 65–99)
GLUCOSE BLD-MCNC: 159 MG/DL (ref 65–99)
GLUCOSE BLD-MCNC: 183 MG/DL (ref 65–99)
GLUCOSE SERPL-MCNC: 172 MG/DL (ref 65–99)
GLUCOSE UR STRIP.AUTO-MCNC: NEGATIVE MG/DL
HYALINE CASTS #/AREA URNS LPF: ABNORMAL /LPF
KETONES UR STRIP.AUTO-MCNC: NEGATIVE MG/DL
LEUKOCYTE ESTERASE UR QL STRIP.AUTO: ABNORMAL
MAGNESIUM SERPL-MCNC: 1.8 MG/DL (ref 1.5–2.5)
MICRO URNS: ABNORMAL
NITRITE UR QL STRIP.AUTO: NEGATIVE
PH UR STRIP.AUTO: 5 [PH] (ref 5–8)
POTASSIUM SERPL-SCNC: 3.3 MMOL/L (ref 3.6–5.5)
PROT SERPL-MCNC: 6.5 G/DL (ref 6–8.2)
PROT UR QL STRIP: 30 MG/DL
RBC # URNS HPF: ABNORMAL /HPF
RBC UR QL AUTO: NEGATIVE
RENAL EPI CELLS #/AREA URNS HPF: ABNORMAL /HPF
SODIUM SERPL-SCNC: 133 MMOL/L (ref 135–145)
SP GR UR STRIP.AUTO: 1.01
TRANS CELLS #/AREA URNS HPF: ABNORMAL /HPF
UROBILINOGEN UR STRIP.AUTO-MCNC: 0.2 MG/DL
WBC #/AREA URNS HPF: ABNORMAL /HPF
YEAST BUDDING URNS QL: PRESENT /HPF
YEAST HYPHAE #/AREA URNS HPF: PRESENT /HPF

## 2021-06-28 PROCEDURE — 83735 ASSAY OF MAGNESIUM: CPT

## 2021-06-28 PROCEDURE — A9270 NON-COVERED ITEM OR SERVICE: HCPCS | Performed by: HOSPITALIST

## 2021-06-28 PROCEDURE — 99233 SBSQ HOSP IP/OBS HIGH 50: CPT | Performed by: FAMILY MEDICINE

## 2021-06-28 PROCEDURE — 700102 HCHG RX REV CODE 250 W/ 637 OVERRIDE(OP): Performed by: FAMILY MEDICINE

## 2021-06-28 PROCEDURE — 85025 COMPLETE CBC W/AUTO DIFF WBC: CPT

## 2021-06-28 PROCEDURE — A9270 NON-COVERED ITEM OR SERVICE: HCPCS | Performed by: FAMILY MEDICINE

## 2021-06-28 PROCEDURE — 700102 HCHG RX REV CODE 250 W/ 637 OVERRIDE(OP): Performed by: HOSPITALIST

## 2021-06-28 PROCEDURE — 80053 COMPREHEN METABOLIC PANEL: CPT

## 2021-06-28 PROCEDURE — 36415 COLL VENOUS BLD VENIPUNCTURE: CPT

## 2021-06-28 PROCEDURE — 770006 HCHG ROOM/CARE - MED/SURG/GYN SEMI*

## 2021-06-28 PROCEDURE — 700111 HCHG RX REV CODE 636 W/ 250 OVERRIDE (IP): Performed by: FAMILY MEDICINE

## 2021-06-28 PROCEDURE — 81001 URINALYSIS AUTO W/SCOPE: CPT

## 2021-06-28 PROCEDURE — 70450 CT HEAD/BRAIN W/O DYE: CPT | Mod: ME

## 2021-06-28 PROCEDURE — 82962 GLUCOSE BLOOD TEST: CPT | Mod: 91

## 2021-06-28 RX ORDER — MAGNESIUM SULFATE HEPTAHYDRATE 40 MG/ML
2 INJECTION, SOLUTION INTRAVENOUS ONCE
Status: COMPLETED | OUTPATIENT
Start: 2021-06-28 | End: 2021-06-28

## 2021-06-28 RX ORDER — ACETAMINOPHEN 500 MG
1000 TABLET ORAL 3 TIMES DAILY
Status: DISCONTINUED | OUTPATIENT
Start: 2021-06-28 | End: 2021-06-29 | Stop reason: HOSPADM

## 2021-06-28 RX ADMIN — FUROSEMIDE 40 MG: 10 INJECTION, SOLUTION INTRAMUSCULAR; INTRAVENOUS at 05:47

## 2021-06-28 RX ADMIN — ACETAMINOPHEN 1000 MG: 500 TABLET ORAL at 08:39

## 2021-06-28 RX ADMIN — POTASSIUM CHLORIDE 20 MEQ: 1500 TABLET, EXTENDED RELEASE ORAL at 18:24

## 2021-06-28 RX ADMIN — INSULIN LISPRO 1 UNITS: 100 INJECTION, SOLUTION INTRAVENOUS; SUBCUTANEOUS at 18:25

## 2021-06-28 RX ADMIN — ACETAMINOPHEN 1000 MG: 500 TABLET ORAL at 18:25

## 2021-06-28 RX ADMIN — CARVEDILOL 3.12 MG: 6.25 TABLET, FILM COATED ORAL at 18:24

## 2021-06-28 RX ADMIN — FUROSEMIDE 40 MG: 10 INJECTION, SOLUTION INTRAMUSCULAR; INTRAVENOUS at 18:24

## 2021-06-28 RX ADMIN — CARVEDILOL 3.12 MG: 6.25 TABLET, FILM COATED ORAL at 08:39

## 2021-06-28 RX ADMIN — INSULIN LISPRO 1 UNITS: 100 INJECTION, SOLUTION INTRAVENOUS; SUBCUTANEOUS at 08:59

## 2021-06-28 RX ADMIN — APIXABAN 2.5 MG: 5 TABLET, FILM COATED ORAL at 18:25

## 2021-06-28 RX ADMIN — POLYETHYLENE GLYCOL 3350 1 PACKET: 17 POWDER, FOR SOLUTION ORAL at 05:48

## 2021-06-28 RX ADMIN — POTASSIUM CHLORIDE 20 MEQ: 1500 TABLET, EXTENDED RELEASE ORAL at 05:47

## 2021-06-28 RX ADMIN — MAGNESIUM SULFATE 2 G: 2 INJECTION INTRAVENOUS at 08:40

## 2021-06-28 RX ADMIN — ATORVASTATIN CALCIUM 20 MG: 20 TABLET, FILM COATED ORAL at 20:55

## 2021-06-28 RX ADMIN — DOCUSATE SODIUM 50 MG AND SENNOSIDES 8.6 MG 2 TABLET: 8.6; 5 TABLET, FILM COATED ORAL at 05:48

## 2021-06-28 RX ADMIN — APIXABAN 2.5 MG: 5 TABLET, FILM COATED ORAL at 05:47

## 2021-06-28 RX ADMIN — INSULIN LISPRO 1 UNITS: 100 INJECTION, SOLUTION INTRAVENOUS; SUBCUTANEOUS at 20:57

## 2021-06-28 ASSESSMENT — ENCOUNTER SYMPTOMS
SPEECH CHANGE: 0
NAUSEA: 0
BACK PAIN: 0
SENSORY CHANGE: 0
MYALGIAS: 0
FLANK PAIN: 0
NERVOUS/ANXIOUS: 0
HEARTBURN: 0
DIARRHEA: 0
WHEEZING: 0
CHILLS: 0
FEVER: 0
VOMITING: 0
COUGH: 0
DIAPHORESIS: 0
PALPITATIONS: 0
ABDOMINAL PAIN: 0
DIZZINESS: 0
FOCAL WEAKNESS: 0
WEAKNESS: 1
SHORTNESS OF BREATH: 0
SORE THROAT: 0
NECK PAIN: 0
BLURRED VISION: 0
HEADACHES: 0

## 2021-06-28 ASSESSMENT — PAIN DESCRIPTION - PAIN TYPE
TYPE: ACUTE PAIN
TYPE: ACUTE PAIN

## 2021-06-28 NOTE — DISCHARGE PLANNING
Anticipated Discharge Disposition:   Home with Home Health     Action:   This RN CM is following the case . Spoke with Pt's daughter Elana who states that she thought of discharging her mother to a SNF but she changed her mind. She wants to take Pt home because she wants to make sure that  Pt will be fed and will get good care.   Per Elana, as of this point she does not trust that any SNF in [place] can provide a better care compared if she takes Pt to home.    Pt has been accepted by [facility].    Elana also talked about the Pt's missing dentures. She knew that it was escalated to the Management of [facility] and wants to know an update today how the management will assist Pt and her in getting the dentures replaced.    Per Viki, Supervisor she escalated this case  to Yessenia, .  Per Yessenia, she will call Elana, Pt's daughter to explain what [facility] Management is planning to get it replaced.     This RN CM spoke with Elana and Pt's two other Sisters at bedside. The decision is to take Pt to home with HH per Elana.    Informed Elana that this RN CM can assist Pt with transport but only when Pt discharges to home.    This RN CM provided Elana with [facility]'s telephone number. Per Elana it is okay for Pt to pay for transport for Pt's future appointments.     Per Elana, she received a call from Yessenia, [facility] Manager about the missing dentures.     Verbal consent for Second IMM obtained from Pt's daughter  Elana because Pt cannot sign as she cannot move her arms/hands.       Barriers to Discharge:   Pending medical clearance  Transport to home    Plan:   Will continue to assist Pt with discharge as needed

## 2021-06-28 NOTE — CARE PLAN
Problem: Fluid Volume  Goal: Fluid volume balance will be maintained  Outcome: Progressing  Note: I/O per flowsheet     Problem: Fall Risk  Goal: Patient will remain free from falls  Outcome: Progressing  Note: Patient reoriented to fall precautions. Lai fall risk per Samia Pimentel. Bed alarm on and audible for safety. Bed low/locked, non skid socks in place. Patient remains free from injury      The patient is Stable - Low risk of patient condition declining or worsening    Shift Goals  Clinical Goals: free from falls, EOB stand  Patient Goals: Reorient, pain control  Family Goals: reorient patient, safety of patient    Progress made toward(s) clinical / shift goals:  free from falls. Family education provided. TAPS system, heel float boots in place.     Patient is not progressing towards the following goals: Patient unable to follow directions. Unsafe for standing at EOB.

## 2021-06-28 NOTE — DIETARY
"Nutrition services: Day 8 of admit.  Yoon Richards is a 89 y.o. female with admitting DX of AMS (altered mental status).    Consult received for \"Poor PO intake due to loss of dentures. <25% of meals consumed\". Visited with pt at bedside. RN present as well. Pt was sleeping, was difficult to arouse. Pt appeared adequately nourished. Per RN at bedside, pt has not been eating well d/t losing her dentures on meal tray. Kitchen is aware. Nutrition representative to visit with pt to obtain meal choices as able. Current diet order provides softer textured and chopped foods for pt. L5 minced and moist modification may be more appropriate for pt. Magic cup oral nutrition supplement indicated to optimize intake (pt currently on a fluid restriction). RD will add to daily menu. Discussed with RN to call if anything further is needed.    Assessment:  Height: 160 cm (5' 3\")  Weight: 74.2 kg (163 lb 9.3 oz)  Body mass index is 28.98 kg/m²., BMI classification: overweight  Diet/Intake: L6/L0 (soft and bite sized with thin liquids), 1200 mL fluid restriction, 6 small meal and 1:1 supervision modification; PO <25-75% of meals and snacks    Malnutrition Risk: Does not meet criteria per ASPEN guidelines at this time.    Recommendations/Plan:  1. Modify current diet texture to minced and moist.   2. Add Magic cup BID.  3. Encourage intake of meals and supplements.  4. Document intake of all meals and supplements as % taken in ADLs to provide interdisciplinary communication across all shifts.   5. Monitor weight.  6. Nutrition rep will continue to see patient for ongoing meal and snack preferences.     RD will continue to monitor per department guidelines.    "

## 2021-06-28 NOTE — PROGRESS NOTES
TAPS system and heel float boots at bedside. Extensive education provided to family regarding risk for skin breakdown. Educated family regarding q2 turns with TAPS system. Family able to demonstrate proper use. Educated family regarding barrier paste on reddened skin and performing frequent incontinence checks to prevent excoriation of skin. Family at bedside eager to learn, verbalized understanding and able to summarize education.

## 2021-06-28 NOTE — CARE PLAN
Problem: Nutritional:  Goal: Achieve adequate nutritional intake  Description: Patient will consume 50% of meals  Outcome: Progressing   PO <25-50% of meals.

## 2021-06-28 NOTE — PROGRESS NOTES
Pt is laying in bed, call light within reach, bed lowered and locked, fall education reinforced. Unable to assess orientation due to lethargy and pt is on 2L of oxygen via nasal cannula which is baseline. Pt lung sounds are diminished in all lobes, bowel sounds are normoactive in all four quadrants, heart sounds are within defined limits. Pt IV is clean,dry,intact,and patent and saline locked. Pt has a echeverria catheter in place draining a small amount of urine.     At beginning of shift, family verbalized concern that patient is reserved and mostly nonverbal at this time but is able to be sat up and fed meals requiring frequent cues from family.  At approximately 2230, pt presents with decreased mentation, difficult to arouse, and lethargic only responding to voice and pain stimuli. Pt only tolerated being awake for several seconds before falling back asleep. Pt did not present with any obvious distress, oxygen requirements remained the same, and vital signs were stable. Dr. Erazo of the hospitalist service was consulted and came to see the patient at bedside. Orders were given for narcan and medication was given with rapid response RNs at bedside. Result of narcan administration was mild with patient becoming slightly more arousable and blood pressure increasing slightly.    Dr. Erazo updated on the results and another order of narcan was given with similar results. Per Dr. Erazo, additional options to be addressed by day team unless patient's condition changes. Charge Clementine Stokes updated on the situation.

## 2021-06-28 NOTE — PROGRESS NOTES
Overnight Hospitalist Note      I was called to bedside due to concern for worsening mental status.     In summary, this is a 89-year-old woman with a history of HFrEF 30%, hypertension, diabetes, and atrial fibrillation, who was admitted for sepsis secondary to possibly proctitis noted on CT. Per nursing, patient has been more somnolent over the course of the day and also not communicating with the family members, which she was doing prior.  She was also having decreased oral intake.  She had a head CT on presentation on 6/20/2021, which showed no acute abnormalities.  I personally reviewed the head CT, which showed diffuse cerebral atrophy and no intracranial hemorrhages.  Nursing was requesting lactic acid draw as well as repeat head CT to further assess her decline in mental status.    Per chart review, patient has been taking oxycodone for pain, and the last dose was yesterday morning.    Per my exam at bedside, patient had pinpoint pupils and mildly arousable with sternal rub.  She became more awake after naloxone 1 mg IV.  However, she continued to remain nonverbal although she was moving all extremities to stimuli without obvious deficits.    Patient's narcotic medications were discontinued and can be resumed later if needed at low dose with caution.  She appears to be a slow metabolizer.  Patient appeared to be euvolemic with stable vital signs, and I did not feel that the patient needed recheck of lactic acid.  No clinical indication for fluid administration.  I will defer to daytime hospitalist regarding the poor oral intake necessity of a cor track or other fluid support in setting of her HFrEF 30%.

## 2021-06-28 NOTE — DOCUMENTATION QUERY
[facility]                                                                       Query Response Note      PATIENT:               ERICA ORNELAS  ACCT #:                  [id number]  MRN:                     [id number]  :                      [id number]  ADMIT DATE:       2021 9:45 AM  DISCH DATE:          RESPONDING  PROVIDER #:        [id number]           QUERY TEXT:    Encephalopathy is documented in the ED report and progress notes, however,  the type(s) of encephalopathy  are unclear.  Please clarify the type of encephalopathy for this admission.    NOTE:  If an appropriate response is not listed below, please respond with a new note.    The patient's Clinical Indicators include:  NOTED:  Altered mental status  ED Note: Sepsis with encephalopathy without septic shock  Sepsis - pathology consistent with proctitis  PN: Encephalopathy - Avoid benzodiazepines and anticholinergics - Frequent orientation  - PN: Worsening mental status - pinpoint pupils...mildly arousable with sternal rub... more awake after naloxone 1 mg IV...remain nonverbal... Narcotic medications were discontinued...she appears to be a slow metabolizer.   Narcan INJ SOLN  Oxycodone IR   Dementia  Advanced age    Thank you,  Elizabeth Pinon RN, CCS  Clinical Documentation Integrity  Connect via Vo[employer] Messenger  Options provided:   -- Encephalopathy due to sepsis; ONLY   -- Encephalopathy due to medication/drugs; ONLY   -- Encephalopathy due to BOTH sepsis and medication/drugs   -- Unable to determine      Query created by: Elizabeth Pinon on 2021 10:50 AM    RESPONSE TEXT:    Encephalopathy due to BOTH sepsis and medication/drugs          Electronically signed by:  MARTINEZ TAYLOR MD 2021 4:37 PM

## 2021-06-28 NOTE — DISCHARGE PLANNING
Agency/Facility Name: [facility]  Spoke To: christophe  Outcome:  Arrival at 1434 on 6/26 set up and O2 kits on the 6/27

## 2021-06-28 NOTE — CASE COMMUNICATION
Agree with changes  ----- Message -----  From: Kristel Amaral R.N.  Sent: 6/23/2021   3:18 PM PDT  To: Rosemary Solo R.N.      Quality Review for 6.21.21 Transfer OASIS performed on by CINDY Amaral RN on 6.23, 2021:    Edits completed by CINDY Amaral RN:  1. Changed  C to na per POC

## 2021-06-28 NOTE — CARE PLAN
The patient is Watcher - Medium risk of patient condition declining or worsening    Shift Goals  Clinical Goals: increase nutrition  Patient Goals: reorient, pain control  Family Goals: reorient patient, safety of patient    Progress made toward(s) clinical / shift goals:  pt did not want to eat much breakfast,I will try and encourage more intake.    pt reoriented multiple times        Problem: Skin Integrity  Goal: Skin integrity is maintained or improved  Outcome: Progressing     Problem: Fall Risk  Goal: Patient will remain free from falls  Outcome: Progressing       Patient is not progressing towards the following goals:

## 2021-06-28 NOTE — PROGRESS NOTES
[facility] Medicine Daily Progress Note    Date of Service  6/28/2021    Chief Complaint  89 y.o. female admitted 6/20/2021 with proctitis.    Hospital Course  Admitted with Sepsis possibly secondary to Proctitis which was noted on CT scan of the abdomen and pelvis. There was also consideration for possible pneumonia.  She was covered empirically with IV Unasyn.  Gastroenterology was consulted on the case, patient underwent flexible sigmoidoscopy on 6/22/2021 which showed slightly edematous rectal mucosa without ulceration status post biopsy, 2 diminutive sigmoid polyp removed, Left-sided sigmoid colon biopsy, Solid stool in the rectal vault, removed.  She also has known history of paroxysmal atrial fibrillation, congestive heart failure, and appeared to be volume overloaded. She was started on diuresis with IV Lasix. She also has known history of Urinary retention, and Gomes catheter has been placed.  Pathology results did show confirmation of proctitis.  She responded well to the IV Unasyn and her WBC has trended down.  With IV Lasix diuresis, multiple electrolytes were also being replaced.     Interval Problem Update  Proctitis - pathology consistent with proctitis  Resp failure - O2 2 lpm NC  HTN - sbp 110-130  Diabetes - -200  Afib - currently sinus  CHF - EF 30%  Encephalopathy -episode last night where patient was difficult to arouse, she had just taken oxycodone, Narcan was given, she became more awake and alert  Low magnesium, potassium    Updates given and plan of care discussed with patient's family who were at bedside.    Consultants/Specialty  Gastroenterology    Code Status  DNAR/DNI    Disposition  Home health, Home O2, DME    Review of Systems  Review of Systems   Constitutional: Negative for chills, diaphoresis, fever and malaise/fatigue.   HENT: Negative for congestion, hearing loss and sore throat.    Eyes: Negative for blurred vision.   Respiratory: Negative for cough, shortness of breath and  wheezing.    Cardiovascular: Negative for chest pain, palpitations and leg swelling.   Gastrointestinal: Negative for abdominal pain, diarrhea, heartburn, nausea and vomiting.   Genitourinary: Negative for dysuria, flank pain and hematuria.   Musculoskeletal: Negative for back pain, joint pain, myalgias and neck pain.   Skin: Negative for rash.   Neurological: Positive for weakness. Negative for dizziness, sensory change, speech change, focal weakness and headaches.   Psychiatric/Behavioral: The patient is not nervous/anxious.         Physical Exam  Temp:  [36.6 °C (97.8 °F)-37.2 °C (98.9 °F)] 36.6 °C (97.8 °F)  Pulse:  [83-93] 83  Resp:  [16-18] 17  BP: (110-133)/(43-66) 130/63  SpO2:  [94 %-97 %] 97 %    Physical Exam  Vitals and nursing note reviewed.   HENT:      Head: Normocephalic and atraumatic.      Nose: No congestion.      Mouth/Throat:      Mouth: Mucous membranes are moist.   Eyes:      Conjunctiva/sclera: Conjunctivae normal.      Pupils: Pupils are equal, round, and reactive to light.   Cardiovascular:      Rate and Rhythm: Normal rate and regular rhythm.      Heart sounds: Murmur heard.     Pulmonary:      Effort: Pulmonary effort is normal.      Breath sounds: Rales present.   Abdominal:      General: There is distension.      Tenderness: There is no abdominal tenderness. There is no guarding or rebound.   Genitourinary:     Comments: Gomes in place   Musculoskeletal:         General: No swelling.      Cervical back: No tenderness.      Right knee: No tenderness.      Right lower leg: No edema.      Left lower leg: No edema.   Neurological:      General: No focal deficit present.      Mental Status: She is alert. She is disoriented.      Cranial Nerves: No cranial nerve deficit.   Psychiatric:         Speech: Speech is tangential.         Cognition and Memory: Cognition is impaired. Memory is impaired.         Fluids    Intake/Output Summary (Last 24 hours) at 6/28/2021 1123  Last data filed at  6/28/2021 0717  Gross per 24 hour   Intake 370 ml   Output 2495 ml   Net -2125 ml       Laboratory  Recent Labs     06/26/21  1134 06/27/21  0941   WBC 9.5 10.7   RBC 3.50* 3.42*   HEMOGLOBIN 9.4* 9.3*   HEMATOCRIT 29.5* 28.6*   MCV 84.3 83.6   MCH 26.9* 27.2   MCHC 31.9* 32.5*   RDW 62.4* 60.3*   PLATELETCT 178 See Note   MPV 11.5 See Note     Recent Labs     06/26/21  1134 06/27/21  0941 06/28/21  0449   SODIUM 132* 132* 133*   POTASSIUM 3.7 3.4* 3.3*   CHLORIDE 97 95* 96   CO2 26 29 28   GLUCOSE 204* 159* 172*   BUN 12 13 16   CREATININE 0.58 0.56 0.55   CALCIUM 8.2* 8.6 8.2*                   Imaging  EC-ECHOCARDIOGRAM COMPLETE W/ CONT   Final Result      US-EXTREMITY VENOUS LOWER BILAT   Final Result      CT-ABDOMEN-PELVIS WITH   Final Result      1.  Wall thickening of the rectum is noted and there is induration around the perirectal space. Findings could be due to proctitis.      2.  Small right pleural effusion is slightly larger than on prior exam.      3.  Consolidation and volume loss is again noted in each lung base.      4.  Cardiomegaly is again noted.         CT-HEAD W/O   Final Result         NO ACUTE ABNORMALITIES ARE NOTED ON CT SCAN OF THE HEAD.      Findings are consistent with atrophy.  Decreased attenuation in the periventricular white matter likely indicates microvascular ischemic disease.      DX-CHEST-PORTABLE (1 VIEW)   Final Result         Ill-defined opacifications in each lung have increased compared to the prior radiograph.  This could indicate worsening of pulmonary edema or inflammation.      CT-HEAD W/O    (Results Pending)        Assessment/Plan  * Sepsis (HCC)- (present on admission)  Assessment & Plan  This is Sepsis Present on admission  SIRS criteria identified on my evaluation include: Fever, with temperature greater than 101 deg F, Tachypnea, with respirations greater than 20 per minute and Leukocytosis, with WBC greater than 12,000  Source - Pneumonia vs Proctitis  Fluid  resuscitation ordered per protocol  IV antibiotics as appropriate for source of sepsis  While organ dysfunction may be noted elsewhere in this problem list or in the chart, degree of organ dysfunction does not meet CMS criteria for severe sepsis        Frailty syndrome in geriatric patient- (present on admission)  Assessment & Plan  PT and OT    Encephalopathy- (present on admission)  Assessment & Plan  Avoid benzodiazepines and anticholinergics  Frequent orientation  Avoid early morning labs  Avoid vital signs during sleep  Ambulate if possible  Oxycodone discontinued  Check CT of the head    Proctitis- (present on admission)  Assessment & Plan  Omnicef and Flagyl - completed course  Bowel protocol    Pulmonary HTN (HCC)- (present on admission)  Assessment & Plan  IV Lasix to diurese    Tricuspid regurgitation- (present on admission)  Assessment & Plan  moderate to severe    Hypophosphatemia- (present on admission)  Assessment & Plan  Follow level    Hypomagnesemia- (present on admission)  Assessment & Plan  IV Mg 2 g  Follow level    Hypokalemia- (present on admission)  Assessment & Plan  Kdur  Follow bmp    Thrombocytopenia (HCC)- (present on admission)  Assessment & Plan  Follow cbc    Normocytic anemia- (present on admission)  Assessment & Plan  Follow cbc    Chronic respiratory failure with hypoxia (HCC)- (present on admission)  Assessment & Plan  RT protocol    Chronic systolic heart failure (HCC)- (present on admission)  Assessment & Plan  Coreg, Losartan  IV Lasix to diurese  Fluid restriction 1.2 L per day    Urinary retention- (present on admission)  Assessment & Plan  Gomes in place  Follow-up with Urology as outpatient - scheduled for 7/6/2021    Dementia (HCC)- (present on admission)  Assessment & Plan  Avoid benzodiazepines and anticholinergics  Frequent orientation  Avoid early morning labs  Avoid vital signs during sleep  Ambulate if possible    Hyponatremia- (present on admission)  Assessment &  Plan  Follow bmp    Paroxysmal A-fib (HCC)- (present on admission)  Assessment & Plan  Coreg, Eliquis    Hypertension- (present on admission)  Assessment & Plan  Losartan, Coreg     Type 2 diabetes mellitus with hyperglycemia, without long-term current use of insulin (HCC)- (present on admission)  Assessment & Plan  SSI       VTE prophylaxis: Eliquis

## 2021-06-29 ENCOUNTER — PHARMACY VISIT (OUTPATIENT)
Dept: PHARMACY | Facility: MEDICAL CENTER | Age: 86
End: 2021-06-29
Payer: COMMERCIAL

## 2021-06-29 ENCOUNTER — PATIENT OUTREACH (OUTPATIENT)
Dept: HEALTH INFORMATION MANAGEMENT | Facility: OTHER | Age: 86
End: 2021-06-29

## 2021-06-29 VITALS
WEIGHT: 157.63 LBS | HEIGHT: 63 IN | SYSTOLIC BLOOD PRESSURE: 152 MMHG | BODY MASS INDEX: 27.93 KG/M2 | DIASTOLIC BLOOD PRESSURE: 77 MMHG | HEART RATE: 70 BPM | RESPIRATION RATE: 17 BRPM | TEMPERATURE: 98.1 F | OXYGEN SATURATION: 97 %

## 2021-06-29 LAB
BASOPHILS # BLD AUTO: 0.2 % (ref 0–1.8)
BASOPHILS # BLD: 0.02 K/UL (ref 0–0.12)
EOSINOPHIL # BLD AUTO: 0.03 K/UL (ref 0–0.51)
EOSINOPHIL NFR BLD: 0.3 % (ref 0–6.9)
ERYTHROCYTE [DISTWIDTH] IN BLOOD BY AUTOMATED COUNT: 59.4 FL (ref 35.9–50)
GLUCOSE BLD-MCNC: 196 MG/DL (ref 65–99)
GLUCOSE BLD-MCNC: 218 MG/DL (ref 65–99)
HCT VFR BLD AUTO: 27.9 % (ref 37–47)
HGB BLD-MCNC: 8.9 G/DL (ref 12–16)
IMM GRANULOCYTES # BLD AUTO: 0.1 K/UL (ref 0–0.11)
IMM GRANULOCYTES NFR BLD AUTO: 1.1 % (ref 0–0.9)
LYMPHOCYTES # BLD AUTO: 0.58 K/UL (ref 1–4.8)
LYMPHOCYTES NFR BLD: 6.2 % (ref 22–41)
MCH RBC QN AUTO: 26.5 PG (ref 27–33)
MCHC RBC AUTO-ENTMCNC: 31.9 G/DL (ref 33.6–35)
MCV RBC AUTO: 83 FL (ref 81.4–97.8)
MONOCYTES # BLD AUTO: 0.86 K/UL (ref 0–0.85)
MONOCYTES NFR BLD AUTO: 9.2 % (ref 0–13.4)
NEUTROPHILS # BLD AUTO: 7.8 K/UL (ref 2–7.15)
NEUTROPHILS NFR BLD: 83 % (ref 44–72)
NRBC # BLD AUTO: 0 K/UL
NRBC BLD-RTO: 0 /100 WBC
PLATELET # BLD AUTO: 258 K/UL (ref 164–446)
PMV BLD AUTO: 10.1 FL (ref 9–12.9)
RBC # BLD AUTO: 3.36 M/UL (ref 4.2–5.4)
WBC # BLD AUTO: 9.4 K/UL (ref 4.8–10.8)

## 2021-06-29 PROCEDURE — 700102 HCHG RX REV CODE 250 W/ 637 OVERRIDE(OP): Performed by: HOSPITALIST

## 2021-06-29 PROCEDURE — 82962 GLUCOSE BLOOD TEST: CPT

## 2021-06-29 PROCEDURE — A9270 NON-COVERED ITEM OR SERVICE: HCPCS | Performed by: HOSPITALIST

## 2021-06-29 PROCEDURE — A9270 NON-COVERED ITEM OR SERVICE: HCPCS | Performed by: FAMILY MEDICINE

## 2021-06-29 PROCEDURE — 302146: Performed by: FAMILY MEDICINE

## 2021-06-29 PROCEDURE — 700111 HCHG RX REV CODE 636 W/ 250 OVERRIDE (IP): Performed by: FAMILY MEDICINE

## 2021-06-29 PROCEDURE — RXMED WILLOW AMBULATORY MEDICATION CHARGE: Performed by: INTERNAL MEDICINE

## 2021-06-29 PROCEDURE — 99239 HOSP IP/OBS DSCHRG MGMT >30: CPT | Performed by: INTERNAL MEDICINE

## 2021-06-29 PROCEDURE — 700102 HCHG RX REV CODE 250 W/ 637 OVERRIDE(OP): Performed by: FAMILY MEDICINE

## 2021-06-29 RX ORDER — POTASSIUM CHLORIDE 20 MEQ/1
20 TABLET, EXTENDED RELEASE ORAL 2 TIMES DAILY
Qty: 28 TABLET | Refills: 0 | Status: SHIPPED | OUTPATIENT
Start: 2021-06-29 | End: 2021-07-13

## 2021-06-29 RX ORDER — FUROSEMIDE 20 MG/1
40 TABLET ORAL DAILY
Qty: 30 TABLET | Refills: 1 | Status: SHIPPED | OUTPATIENT
Start: 2021-06-29

## 2021-06-29 RX ORDER — MAGNESIUM OXIDE 400 MG/1
400 TABLET ORAL DAILY
Qty: 30 TABLET | Refills: 1 | Status: SHIPPED | OUTPATIENT
Start: 2021-06-29

## 2021-06-29 RX ADMIN — FUROSEMIDE 40 MG: 10 INJECTION, SOLUTION INTRAMUSCULAR; INTRAVENOUS at 05:12

## 2021-06-29 RX ADMIN — INSULIN LISPRO 1 UNITS: 100 INJECTION, SOLUTION INTRAVENOUS; SUBCUTANEOUS at 08:33

## 2021-06-29 RX ADMIN — POLYETHYLENE GLYCOL 3350 1 PACKET: 17 POWDER, FOR SOLUTION ORAL at 05:13

## 2021-06-29 RX ADMIN — CARVEDILOL 3.12 MG: 6.25 TABLET, FILM COATED ORAL at 08:31

## 2021-06-29 RX ADMIN — LOSARTAN POTASSIUM 25 MG: 50 TABLET, FILM COATED ORAL at 05:13

## 2021-06-29 RX ADMIN — ACETAMINOPHEN 1000 MG: 500 TABLET ORAL at 08:31

## 2021-06-29 RX ADMIN — POTASSIUM CHLORIDE 20 MEQ: 1500 TABLET, EXTENDED RELEASE ORAL at 05:13

## 2021-06-29 RX ADMIN — APIXABAN 2.5 MG: 5 TABLET, FILM COATED ORAL at 05:12

## 2021-06-29 ASSESSMENT — PAIN DESCRIPTION - PAIN TYPE: TYPE: ACUTE PAIN

## 2021-06-29 NOTE — DISCHARGE INSTRUCTIONS
Discharge Instructions    Discharged to home by medical transportation with relative. Discharged via ambulance, hospital escort: Yes.  Special equipment needed: Oxygen    Be sure to schedule a follow-up appointment with your primary care doctor or any specialists as instructed.     Discharge Plan:   Diet Plan: Discussed  Activity Level: Discussed  Confirmed Follow up Appointment: Patient to Call and Schedule Appointment  Confirmed Symptoms Management: Discussed  Medication Reconciliation Updated: Yes    I understand that a diet low in cholesterol, fat, and sodium is recommended for good health. Unless I have been given specific instructions below for another diet, I accept this instruction as my diet prescription.   Other diet: regular 1200ml fluid restriction    Special Instructions: None    · Is patient discharged on Warfarin / Coumadin?   No     Depression / Suicide Risk    As you are discharged from this [facility] facility, it is important to learn how to keep safe from harming yourself.    Recognize the warning signs:  · Abrupt changes in personality, positive or negative- including increase in energy   · Giving away possessions  · Change in eating patterns- significant weight changes-  positive or negative  · Change in sleeping patterns- unable to sleep or sleeping all the time   · Unwillingness or inability to communicate  · Depression  · Unusual sadness, discouragement and loneliness  · Talk of wanting to die  · Neglect of personal appearance   · Rebelliousness- reckless behavior  · Withdrawal from people/activities they love  · Confusion- inability to concentrate     If you or a loved one observes any of these behaviors or has concerns about self-harm, here's what you can do:  · Talk about it- your feelings and reasons for harming yourself  · Remove any means that you might use to hurt yourself (examples: pills, rope, extension cords, firearm)  · Get professional help from the community (Mental Health,  Substance Abuse, psychological counseling)  · Do not be alone:Call your Safe Contact- someone whom you trust who will be there for you.  · Call your local CRISIS HOTLINE 738-2220 or 036-353-0078  · Call your local Children's Mobile Crisis Response Team [place] (083) 121-8475 or www.Cont[employer]qer  · Call the toll free National Suicide Prevention Hotlines   · National Suicide Prevention Lifeline 933-107-ONPK (7667)  · [place] Hope Line Network 800-SUICIDE (223-0661)      Indwelling Urinary Catheter Care, Adult  An indwelling urinary catheter is a thin tube that is put into your bladder. The tube helps to drain pee (urine) out of your body. The tube goes in through your urethra. Your urethra is where pee comes out of your body. Your pee will come out through the catheter, then it will go into a bag (drainage bag).  Take good care of your catheter so it will work well.  How to wear your catheter and bag  Supplies needed  · Sticky tape (adhesive tape) or a leg strap.  · Alcohol wipe or soap and water (if you use tape).  · A clean towel (if you use tape).  · Large overnight bag.  · Smaller bag (leg bag).  Wearing your catheter  Attach your catheter to your leg with tape or a leg strap.  · Make sure the catheter is not pulled tight.  · If a leg strap gets wet, take it off and put on a dry strap.  · If you use tape to hold the bag on your le. Use an alcohol wipe or soap and water to wash your skin where the tape made it sticky before.  2. Use a clean towel to pat-dry that skin.  3. Use new tape to make the bag stay on your leg.  Wearing your bags  You should have been given a large overnight bag.  · You may wear the overnight bag in the day or night.  · Always have the overnight bag lower than your bladder.  Do not let the bag touch the floor.  · Before you go to sleep, put a clean plastic bag in a wastebasket. Then hang the overnight bag inside the wastebasket.  You should also have a smaller leg bag that  fits under your clothes.  · Always wear the leg bag below your knee.  · Do not wear your leg bag at night.  How to care for your skin and catheter  Supplies needed  · A clean washcloth.  · Water and mild soap.  · A clean towel.  Caring for your skin and catheter         · Clean the skin around your catheter every day:  ? Wash your hands with soap and water.  ? Wet a clean washcloth in warm water and mild soap.  ? Clean the skin around your urethra.  ? If you are female:  ? Gently spread the folds of skin around your vagina (labia).  ? With the washcloth in your other hand, wipe the inner side of your labia on each side. Wipe from front to back.  ? If you are male:  ? Pull back any skin that covers the end of your penis (foreskin).  ? With the washcloth in your other hand, wipe your penis in small circles. Start wiping at the tip of your penis, then move away from the catheter.  ? Move the foreskin back in place, if needed.  ? With your free hand, hold the catheter close to where it goes into your body.  ? Keep holding the catheter during cleaning so it does not get pulled out.  ? With the washcloth in your other hand, clean the catheter.  ? Only wipe downward on the catheter.  ? Do not wipe upward toward your body. Doing this may push germs into your urethra and cause infection.  ? Use a clean towel to pat-dry the catheter and the skin around it. Make sure to wipe off all soap.  ? Wash your hands with soap and water.  · Shower every day. Do not take baths.  · Do not use cream, ointment, or lotion on the area where the catheter goes into your body, unless your doctor tells you to.  · Do not use powders, sprays, or lotions on your genital area.  · Check your skin around the catheter every day for signs of infection. Check for:  ? Redness, swelling, or pain.  ? Fluid or blood.  ? Warmth.  ? Pus or a bad smell.  How to empty the bag  Supplies needed  · Rubbing alcohol.  · Gauze pad or cotton ball.  · Tape or a leg  strap.  Emptying the bag  Pour the pee out of your bag when it is ?-½ full, or at least 2-3 times a day. Do this for your overnight bag and your leg bag.  1. Wash your hands with soap and water.  2. Separate (detach) the bag from your leg.  3. Hold the bag over the toilet or a clean pail. Keep the bag lower than your hips and bladder. This is so the pee (urine) does not go back into the tube.  4. Open the pour spout. It is at the bottom of the bag.  5. Empty the pee into the toilet or pail. Do not let the pour spout touch any surface.  6. Put rubbing alcohol on a gauze pad or cotton ball.  7. Use the gauze pad or cotton ball to clean the pour spout.  8. Close the pour spout.  9. Attach the bag to your leg with tape or a leg strap.  10. Wash your hands with soap and water.  Follow instructions for cleaning the drainage bag:  · From the product maker.  · As told by your doctor.  How to change the bag  Supplies needed  · Alcohol wipes.  · A clean bag.  · Tape or a leg strap.  Changing the bag  Replace your bag when it starts to leak, smell bad, or look dirty.  1. Wash your hands with soap and water.  2. Separate the dirty bag from your leg.  3. Pinch the catheter with your fingers so that pee does not spill out.  4. Separate the catheter tube from the bag tube where these tubes connect (at the connection valve). Do not let the tubes touch any surface.  5. Clean the end of the catheter tube with an alcohol wipe. Use a different alcohol wipe to clean the end of the bag tube.  6. Connect the catheter tube to the tube of the clean bag.  7. Attach the clean bag to your leg with tape or a leg strap. Do not make the bag tight on your leg.  8. Wash your hands with soap and water.  General rules    · Never pull on your catheter. Never try to take it out. Doing that can hurt you.  · Always wash your hands before and after you touch your catheter or bag. Use a mild, fragrance-free soap. If you do not have soap and water, use hand  .  · Always make sure there are no twists or bends (kinks) in the catheter tube.  · Always make sure there are no leaks in the catheter or bag.  · Drink enough fluid to keep your pee pale yellow.  · Do not take baths, swim, or use a hot tub.  · If you are female, wipe from front to back after you poop (have a bowel movement).  Contact a doctor if:  · Your pee is cloudy.  · Your pee smells worse than usual.  · Your catheter gets clogged.  · Your catheter leaks.  · Your bladder feels full.  Get help right away if:  · You have redness, swelling, or pain where the catheter goes into your body.  · You have fluid, blood, pus, or a bad smell coming from the area where the catheter goes into your body.  · Your skin feels warm where the catheter goes into your body.  · You have a fever.  · You have pain in your:  ? Belly (abdomen).  ? Legs.  ? Lower back.  ? Bladder.  · You see blood in the catheter.  · Your pee is pink or red.  · You feel sick to your stomach (nauseous).  · You throw up (vomit).  · You have chills.  · Your pee is not draining into the bag.  · Your catheter gets pulled out.  Summary  · An indwelling urinary catheter is a thin tube that is placed into the bladder to help drain pee (urine) out of the body.  · The catheter is placed into the part of the body that drains pee from the bladder (urethra).  · Taking good care of your catheter will keep it working properly and help prevent problems.  · Always wash your hands before and after touching your catheter or bag.  · Never pull on your catheter or try to take it out.  This information is not intended to replace advice given to you by your health care provider. Make sure you discuss any questions you have with your health care provider.  Document Released: 04/14/2014 Document Revised: 04/10/2020 Document Reviewed: 08/03/2018  Elsevier Patient Education © 2020 [employer]

## 2021-06-29 NOTE — CARE PLAN
Problem: Knowledge Deficit - Standard  Goal: Patient and family/care givers will demonstrate understanding of plan of care, disease process/condition, diagnostic tests and medications  Outcome: Progressing  Note: Pt has dementia and is unable to communicate plan of care or treatment plan     Problem: Respiratory  Goal: Patient will achieve/maintain optimum respiratory ventilation and gas exchange  Note: Pt maintains appropriate oxygen saturation   The patient is Watcher - Medium risk of patient condition declining or worsening    Shift Goals  Clinical Goals: increase nutrition  Patient Goals: reorient, pain control  Family Goals: reorient patient, safety of patient    Progress made toward(s) clinical / shift goals:  Progressing as expected    Patient is not progressing towards the following goals:

## 2021-06-29 NOTE — CARE PLAN
The patient is Stable - Low risk of patient condition declining or worsening    Shift Goals  Clinical Goals: increase nutrition  Patient Goals: discharge pt  Family Goals: reorient patient, safety of patient    Progress made toward(s) clinical / shift goals:  pt is more alert and eating more, she will discharge today    Problem: Knowledge Deficit - Standard  Goal: Patient and family/care givers will demonstrate understanding of plan of care, disease process/condition, diagnostic tests and medications  Outcome: Progressing     Problem: Skin Integrity  Goal: Skin integrity is maintained or improved  Outcome: Progressing     Problem: Fall Risk  Goal: Patient will remain free from falls  Outcome: Progressing         Patient is not progressing towards the following goals:

## 2021-06-29 NOTE — DISCHARGE SUMMARY
Discharge Summary    CHIEF COMPLAINT ON ADMISSION  Chief Complaint   Patient presents with   • Blood Infection     ALOC today per family - pt normally GCS 14, now responding minimally to questions. New bilateral edema to legs since yesterday & ++ distended abdomen. Discharged from [facility] 5 days ago for urinary retention, catheter placed. Small amount of dark urine in bag currently. Pt febrile at home.       Reason for Admission  ems     Admission Date  6/20/2021    CODE STATUS  DNAR/DNI    HPI & HOSPITAL COURSE  This is a 89 y.o. female here with below medical issues.   89-year-old female with a history of CAD, chronic systolic heart failure, dementia, diabetes, hypertension admitted with worsening confusion and abdominal distention.  At baseline patient can walk around, eat by herself, conversive, normal long-term memory but has trouble with short-term memory, lives with family.  Last week patient noted to have difficulty urinating so home health nurse placed Gomes which caused significant pain and irritation so patient was taken to the ED on 6/15 and urinalysis concerning for possible UTI so was started on oral antibiotics however urine culture was negative.  Patient continued to have worsening confusion and abdominal distention.  Brought into the ED again on 6/20, she was febrile, tachypneic.  CT scan found wall thickening of the rectum concerning for proctitis as well as small right pleural effusion, consolidation and volume loss and each lung and cardiomegaly.  CT head negative for acute findings.  She was started on IV antibiotics.  GI consulted. She underwent the following procedures:    Findings:    Digital rectal examination demonstrated solid stool in the rectal vault.  No mass palpable on rectal examination.  Scope inserted to 40 cm from anus.  Solid stool noted in the rectum as well.  No gross abnormal mucosa seen of the sigmoid colon.  Biopsies taken of the left side colon to ensure no microscopic  inflammation.  Diminutive polyps x 2 (2mm and 2mm) removed with cold forcep biopsies.  Rectum mucosa appears slightly edematous but without mass lesion or obvious erythema.  Biopsies taken.  Retroflexion with small hemorrhoids.  No mass seen.  Please note extensive suctioning and digital fecal disimpaction was performed for evaluation of the rectum     Impressions:   1.  Slightly edematous rectal mucosa without ulceration status post biopsy  2.  2 diminutive sigmoid polyp removed  3.  Left-sided sigmoid colon biopsy  4.  Solid stool in the rectal vault, removed    She tolerated the procedure well. She received a total of 5 days of omnicef and flagyl. Cultures grew nothing. Pathology showed:    A. Sigmoid colon polyps x2:          Benign colonic tissue fragments          One colonic fragment is suggestive of a hyperplastic polyp with           associated mild acute and chronic inflammation.          Another colonic fragment shows stromal edema and mild acute           inflammation and a few slightly hyperplastic glands          No adenomatous change, atypia or malignancy seen   B. Left colon biopsy:          Fragments of histologically unremarkable colonic mucosa   C. Rectal biopsy:          Benign rectal mucosa showing active proctitis with focal           denudation of the epithelium     Her mental status slowly improved but still remains below her baseline. She was noted to be quite volume overloaded as well and received IV diuretics with some good improvement. She normally sees Dr Ferrer of CHF clinic and she has an appointment for repeat ECHO on 7/15 (unclear if necessary given ECHO on 6/23 during this admission with EF 30%) and repeat appointment with CHF clinic on 7/22/2021. Daughter used to weight patient every day but given patient's immobility this will be difficult. We talked about other signs and symptoms of CHF including change in breathing and neck vein distention. I doubt she is a candidate for  CardioMEMS given her age and other medical co-morbidities. Her discharge weight was 71.5 Kg. She was recommended for SNF, but family wanted to take her home. Home o2 and HH services have both bene set up for patient. She is a confirmed DNR/DNI and I discuss if things worsen given all of her medical conditions and somewhat severe sCHF she could be a good candidate for hospice down the road.      Therefore, she is discharged in fair and stable condition to home with close outpatient follow-up.    The patient met 2-midnight criteria for an inpatient stay at the time of discharge.    Discharge Date  6/29/2021    FOLLOW UP ITEMS POST DISCHARGE  F/U with CHF clinic as outlined above  Repeat BMP in 2 weeks and follow up with her PCP in 1-2 weeks  Check daily symptoms for CHF as outlined above, consider going to a local [facility] clinic for weights using divine lift.    DISCHARGE DIAGNOSES  Principal Problem:    Sepsis (HCC) POA: Yes  Active Problems:    Type 2 diabetes mellitus with hyperglycemia, without long-term current use of insulin (HCC) POA: Yes    Hypertension POA: Yes    Paroxysmal A-fib (HCC) POA: Yes    Hyponatremia POA: Yes    Dementia (HCC) POA: Yes    Urinary retention POA: Yes    Chronic systolic heart failure (HCC) POA: Yes    Chronic respiratory failure with hypoxia (HCC) POA: Yes    Normocytic anemia POA: Yes    Thrombocytopenia (HCC) POA: Yes    Hypokalemia POA: Yes    Proctitis POA: Yes    Hypomagnesemia POA: Yes    Hypophosphatemia POA: Yes    Tricuspid regurgitation POA: Yes    Pulmonary HTN (HCC) POA: Yes    Encephalopathy POA: Yes    Frailty syndrome in geriatric patient POA: Yes  Resolved Problems:    * No resolved hospital problems. *      FOLLOW UP  Future Appointments   Date Time Provider Department Center   7/15/2021  3:15 PM [facility] EXAM 9 ECHO [facility]   7/22/2021  4:00 PM Uvaldo Ferrer M.D. [facility] None   8/20/2021  3:00 PM EMELYN Polanco     Urology  [place]    On 7/6/2021  0845 am  @ [id number]       MEDICATIONS ON DISCHARGE     Medication List      START taking these medications      Instructions   magnesium oxide 400 MG Tabs tablet  Commonly known as: MAG-OX   Take 1 tablet by mouth every day.  Dose: 400 mg     potassium chloride SA 20 MEQ Tbcr  Commonly known as: Kdur   Take 1 tablet by mouth 2 times a day for 14 days.  Dose: 20 mEq        CHANGE how you take these medications      Instructions   furosemide 20 MG Tabs  What changed:   · how much to take  · when to take this  · additional instructions  Commonly known as: LASIX   Take 2 Tablets by mouth every day.  Dose: 40 mg        CONTINUE taking these medications      Instructions   acetaminophen 650 MG CR tablet  Commonly known as: TYLENOL   Take 650 mg by mouth every 6 hours as needed for Moderate Pain. Indications: Fever, Pain  Dose: 650 mg     apixaban 2.5mg Tabs  Commonly known as: ELIQUIS   Take 1 tablet by mouth 2 times a day.  Dose: 2.5 mg     atorvastatin 20 MG Tabs  Commonly known as: LIPITOR   Take 20 mg by mouth every evening.  Dose: 20 mg     carvedilol 3.125 MG Tabs  Commonly known as: COREG   Take 1 tablet by mouth 2 times a day with meals.  Dose: 3.125 mg     Home Care Oxygen   Inhale 2 L/min continuous. Oxygen dose range: 1 L/min  Respiratory route via: Nasal Cannula   Oxygen supplier: [employer]  Dose: 2 L/min     IRON 100 PLUS PO   Take 1 tablet by mouth every day.  Dose: 1 tablet     loratadine 10 MG Tabs  Commonly known as: CLARITIN   Take 10 mg by mouth 1 time a day as needed (for itching).  Dose: 10 mg     losartan 25 MG Tabs  Commonly known as: COZAAR   Take 1 tablet by mouth every day.  Dose: 25 mg     MiraLax 17 g Pack  Generic drug: polyethylene glycol/lytes   Take 17 g by mouth 2 times a day as needed (constipation). Indications: Constipation  Dose: 17 g     oxybutynin 5 MG Tabs  Commonly known as: DITROPAN   Take 1 tablet by mouth 2 times a day.  Dose: 5 mg     oyster shell  calcium/vitamin D 250-125 MG-UNIT Tabs tablet   Take 1 tablet by mouth every day.  Dose: 1 tablet     thiamine 100 MG Tabs  Commonly known as: Vitamin B-1   Take 100 mg by mouth every day.  Dose: 100 mg     Tradjenta 5 MG Tabs tablet  Generic drug: linagliptin   Take 1 tablet by mouth every day. Indications: Type 2 Diabetes  Dose: 5 mg     vitamin D 1000 Unit (25 mcg) Tabs  Commonly known as: cholecalciferol   Take 1,000 Units by mouth every day.  Dose: 1,000 Units        STOP taking these medications    cefdinir 300 MG Caps  Commonly known as: OMNICEF     HYDROcodone-acetaminophen 5-325 MG Tabs per tablet  Commonly known as: NORCO            Allergies  Allergies   Allergen Reactions   • Ciprofloxacin Itching   • Donepezil Vomiting   • Hydrochlorothiazide Itching   • Sulfa Drugs Itching   • Trimethoprim Itching       DIET  Orders Placed This Encounter   Procedures   • Diet Order Diet: Level 4 - Pureed; Liquid level: Level 0 - Thin; Fluid modifications: (optional): 1200 ml Fluid Restriction; Tray Modifications (optional): Small Meals, SLP - 1:1 Supervision by Nursing     Standing Status:   Standing     Number of Occurrences:   1     Order Specific Question:   Diet:     Answer:   Level 4 - Pureed [25]     Order Specific Question:   Liquid level     Answer:   Level 0 - Thin     Order Specific Question:   Fluid modifications: (optional)     Answer:   1200 ml Fluid Restriction [8]     Order Specific Question:   Tray Modifications (optional)     Answer:   Small Meals     Order Specific Question:   Tray Modifications (optional)     Answer:   SLP - 1:1 Supervision by Nursing       ACTIVITY  As tolerated.  Weight bearing as tolerated    CONSULTATIONS  GI    PROCEDURES  As noted above    CT-HEAD W/O   Final Result      1. No CT evidence of acute infarct, hemorrhage or mass.   2. Moderate to severe global parenchymal atrophy. Chronic small vessel ischemic changes.      EC-ECHOCARDIOGRAM COMPLETE W/ CONT   Final Result       US-EXTREMITY VENOUS LOWER BILAT   Final Result      CT-ABDOMEN-PELVIS WITH   Final Result      1.  Wall thickening of the rectum is noted and there is induration around the perirectal space. Findings could be due to proctitis.      2.  Small right pleural effusion is slightly larger than on prior exam.      3.  Consolidation and volume loss is again noted in each lung base.      4.  Cardiomegaly is again noted.         CT-HEAD W/O   Final Result         NO ACUTE ABNORMALITIES ARE NOTED ON CT SCAN OF THE HEAD.      Findings are consistent with atrophy.  Decreased attenuation in the periventricular white matter likely indicates microvascular ischemic disease.      DX-CHEST-PORTABLE (1 VIEW)   Final Result         Ill-defined opacifications in each lung have increased compared to the prior radiograph.  This could indicate worsening of pulmonary edema or inflammation.            LABORATORY  Lab Results   Component Value Date    SODIUM 133 (L) 06/28/2021    POTASSIUM 3.3 (L) 06/28/2021    CHLORIDE 96 06/28/2021    CO2 28 06/28/2021    GLUCOSE 172 (H) 06/28/2021    BUN 16 06/28/2021    CREATININE 0.55 06/28/2021        Lab Results   Component Value Date    WBC 10.7 06/27/2021    HEMOGLOBIN 9.3 (L) 06/27/2021    HEMATOCRIT 28.6 (L) 06/27/2021    PLATELETCT See Note 06/27/2021        Total time of the discharge process exceeds 43 minutes.

## 2021-06-29 NOTE — PROGRESS NOTES
Pt is being discharged via [facility], discharge paperwork has been reviewed and signed by patients daughter. Pts IV has been removed. Gomes is to remain in place.

## 2021-06-29 NOTE — DISCHARGE PLANNING
Agency/Facility Name: [facility]   Spoke To: Nicolas   Outcome: DPA informed Nicolas of Pt D/C today 06/29

## 2021-06-29 NOTE — DISCHARGE PLANNING
Care Transition Team Discharge Planning    Anticipates discharge disposition:  • Home with Home Health and help from family    Action:  • This RN CM is following the case. Spoke with Elana, Pt's daughter who states that Pt's oxygen was delivered to bedside thru [facility].  • Explained to Elana and RENEE Munoz that this RN CM already faxed [facility], [facility] and Face Sheet to [facility] Tierney.   • [facility]. [facility] was notified via voalte.  • Requested the soonest possible time [facility] by Elana.  • Awaiting transport time from [facility]     This RN CM requested Ryan STEPHENSON to inform Admissions at [facility] that Pt is discharging today.    Meds to bed is complete  Pt's daughter to arrange Pt's future appointments.     Per Michaela Rodriguez, [facility] Tierney ,Pt's [facility] transport is all set at 13:30 to home today.     Barriers to Discharge:  • Pending transportation to home    Plan:  • Will continue to assist Pt with discharge as needed

## 2021-06-29 NOTE — PROGRESS NOTES
Pt is laying in bed, call light within reach, bed lowered and locked, fall education reinforced. Unable to assess orientation due to lethargy and pt is on 2L of oxygen via nasal cannula which is baseline. Pt lung sounds are diminished in all lobes, bowel sounds are normoactive in all four quadrants, heart sounds are within defined limits. Pt IV is clean,dry,intact,and patent and saline locked. Pt has a echeverria catheter in place draining a small amount of urine

## 2021-06-29 NOTE — DISCHARGE PLANNING
Meds-to-Beds: Discharge prescription orders listed below delivered to patient's bedside. RENEE Munoz notified. Written information regarding the dispensed prescriptions was provided to the patient including the phone number of the pharmacy to call for any questions.     Yoon Richards   Home Medication Instructions SO:[id number]    Printed on:06/29/21 1010   Medication Information                      furosemide (LASIX) 20 MG Tab  Take 2 Tablets by mouth every day.             magnesium oxide (MAG-OX) 400 MG Tab tablet  Take 1 tablet by mouth every day.             potassium chloride SA (KDUR) 20 MEQ Tab CR  Take 1 tablet by mouth 2 times a day for 14 days.                 Delaney Aparicio, PharmD

## 2021-06-29 NOTE — DISCHARGE PLANNING
Received Transport Form @ 6257  Spoke to Kenrick @ [facility].  Spoke to Ira @ [facility]. Auth: [id number].  Spoke to Hugh @ [facility]. Pt does not have transport benefits through [facility].    Transport is scheduled for 6/29 @1330 going to residence.    RN CM and BS RN notified of scheduled transport via Voalte @1626.

## 2021-07-01 ENCOUNTER — HOME CARE VISIT (OUTPATIENT)
Dept: HOME HEALTH SERVICES | Facility: HOME HEALTHCARE | Age: 86
End: 2021-07-01
Payer: MEDICARE

## 2021-07-03 ENCOUNTER — HOME CARE VISIT (OUTPATIENT)
Dept: HOME HEALTH SERVICES | Facility: HOME HEALTHCARE | Age: 86
End: 2021-07-03
Payer: MEDICARE

## 2021-07-03 PROCEDURE — G0493 RN CARE EA 15 MIN HH/HOSPICE: HCPCS

## 2021-07-05 VITALS
RESPIRATION RATE: 17 BRPM | SYSTOLIC BLOOD PRESSURE: 128 MMHG | OXYGEN SATURATION: 99 % | DIASTOLIC BLOOD PRESSURE: 61 MMHG | TEMPERATURE: 97.7 F | HEART RATE: 79 BPM

## 2021-07-05 ASSESSMENT — ENCOUNTER SYMPTOMS
NAUSEA: DENIES
SHORTNESS OF BREATH: T
POOR JUDGMENT: 1
VOMITING: DENIES
DIFFICULTY THINKING: 1

## 2021-07-05 ASSESSMENT — PATIENT HEALTH QUESTIONNAIRE - PHQ9
1. LITTLE INTEREST OR PLEASURE IN DOING THINGS: 00
CLINICAL INTERPRETATION OF PHQ2 SCORE: 0
2. FEELING DOWN, DEPRESSED, IRRITABLE, OR HOPELESS: 00

## 2021-07-05 ASSESSMENT — ACTIVITIES OF DAILY LIVING (ADL): OASIS_M1830: 06

## 2021-07-06 ENCOUNTER — HOSPITAL ENCOUNTER (OUTPATIENT)
Facility: MEDICAL CENTER | Age: 86
End: 2021-07-06
Attending: PHYSICIAN ASSISTANT
Payer: MEDICARE

## 2021-07-06 ENCOUNTER — DOCUMENTATION (OUTPATIENT)
Dept: VASCULAR LAB | Facility: MEDICAL CENTER | Age: 86
End: 2021-07-06

## 2021-07-06 LAB
APPEARANCE UR: ABNORMAL
BACTERIA #/AREA URNS HPF: ABNORMAL /HPF
BILIRUB UR QL STRIP.AUTO: NEGATIVE
COLOR UR: YELLOW
EPI CELLS #/AREA URNS HPF: ABNORMAL /HPF
GLUCOSE UR STRIP.AUTO-MCNC: NEGATIVE MG/DL
HYALINE CASTS #/AREA URNS LPF: ABNORMAL /LPF
KETONES UR STRIP.AUTO-MCNC: ABNORMAL MG/DL
LEUKOCYTE ESTERASE UR QL STRIP.AUTO: ABNORMAL
MAGNESIUM SERPL-MCNC: 1.7 MG/DL (ref 1.5–2.5)
MICRO URNS: ABNORMAL
NITRITE UR QL STRIP.AUTO: POSITIVE
PH UR STRIP.AUTO: 6.5 [PH] (ref 5–8)
PHOSPHATE SERPL-MCNC: 3 MG/DL (ref 2.5–4.5)
PROT UR QL STRIP: 100 MG/DL
RBC # URNS HPF: ABNORMAL /HPF
RBC UR QL AUTO: ABNORMAL
SP GR UR STRIP.AUTO: 1.02
TRANS CELLS #/AREA URNS HPF: ABNORMAL /HPF
UROBILINOGEN UR STRIP.AUTO-MCNC: 0.2 MG/DL
WBC #/AREA URNS HPF: ABNORMAL /HPF
YEAST BUDDING URNS QL: PRESENT /HPF
YEAST HYPHAE #/AREA URNS HPF: PRESENT /HPF

## 2021-07-06 PROCEDURE — 87086 URINE CULTURE/COLONY COUNT: CPT

## 2021-07-06 PROCEDURE — 87077 CULTURE AEROBIC IDENTIFY: CPT | Mod: 91

## 2021-07-06 PROCEDURE — 85007 BL SMEAR W/DIFF WBC COUNT: CPT

## 2021-07-06 PROCEDURE — 84100 ASSAY OF PHOSPHORUS: CPT

## 2021-07-06 PROCEDURE — 85025 COMPLETE CBC W/AUTO DIFF WBC: CPT

## 2021-07-06 PROCEDURE — 83735 ASSAY OF MAGNESIUM: CPT

## 2021-07-06 PROCEDURE — 85027 COMPLETE CBC AUTOMATED: CPT

## 2021-07-06 PROCEDURE — 87186 SC STD MICRODIL/AGAR DIL: CPT

## 2021-07-06 PROCEDURE — 81001 URINALYSIS AUTO W/SCOPE: CPT

## 2021-07-06 NOTE — PROGRESS NOTES
Medication chart review for [facility] services    PCP:  Radha Brady P.A.-C.  [address] [address]  Fax: [phone]    Current medication list     Current Outpatient Medications:   •  cefdinir, 300 mg, Oral, BID  •  Probiotic, 1 capsule, Oral, BID  •  furosemide, 40 mg, Oral, DAILY  •  potassium chloride SA, 20 mEq, Oral, BID  •  magnesium oxide, 400 mg, Oral, DAILY  •  loratadine, 10 mg, Oral, QDAY PRN  •  oxybutynin, 5 mg, Oral, BID  •  Tradjenta, 5 mg, Oral, DAILY  •  atorvastatin, 20 mg, Oral, Nightly  •  oyster shell calcium/vitamin D, 1 tablet, Oral, DAILY  •  polyethylene glycol/lytes, 17 g, Oral, BID PRN  •  vitamin D, 1,000 Units, Oral, DAILY  •  carvedilol, 3.125 mg, Oral, BID WITH MEALS  •  losartan, 25 mg, Oral, DAILY  •  apixaban, 2.5 mg, Oral, BID  •  acetaminophen, 650 mg, Oral, Q6HRS PRN  •  Home Care Oxygen, 2 L/min, Inhalation, Continuous  •  thiamine, 100 mg, Oral, DAILY  •  Iron-Vit C-Vit B12-Folic Acid (IRON 100 PLUS PO), 1 tablet, Oral, DAILY    Allergies   Allergen Reactions   • Augmentin [Amoxicillin-Pot Clavulanate] Rash     Rash and itching   • Ciprofloxacin Itching   • Donepezil Vomiting   • Hydrochlorothiazide Itching   • Sulfa Drugs Itching   • Trimethoprim Itching       Labs     Lab Results   Component Value Date/Time    SODIUM 133 (L) 06/28/2021 04:49 AM    POTASSIUM 3.3 (L) 06/28/2021 04:49 AM    CHLORIDE 96 06/28/2021 04:49 AM    CO2 28 06/28/2021 04:49 AM    GLUCOSE 172 (H) 06/28/2021 04:49 AM    BUN 16 06/28/2021 04:49 AM    CREATININE 0.55 06/28/2021 04:49 AM      Lab Results   Component Value Date/Time    ALKPHOSPHAT 120 (H) 06/28/2021 04:49 AM    ASTSGOT 24 06/28/2021 04:49 AM    ALTSGPT 10 06/28/2021 04:49 AM    TBILIRUBIN 0.4 06/28/2021 04:49 AM    ALBUMIN 2.0 (L) 06/28/2021 04:49 AM    ALBUMIN 3.58 (L) 03/06/2021 01:57 PM    INR 1.68 (H) 06/20/2021 10:45 AM          Assessment and Plan:   • Received referral from [facility]. Medications reviewed.   • It was  recommended to stop cefdinir, but patient appears to be on it still.  Clarify with PCP.    CC  Radha Brady P.A.-C.          Jacques Poe, PharmD, MS, BCACP, [facility]  Phone [phone] fax [phone]    This note was created using voice recognition software (Dragon). The accuracy of the dictation is limited by the abilities of the software. I have reviewed the note prior to signing, however some errors in grammar and context are still possible. If you have any questions related to this note please do not hesitate to contact our office.

## 2021-07-07 ENCOUNTER — HOME CARE VISIT (OUTPATIENT)
Dept: HOME HEALTH SERVICES | Facility: HOME HEALTHCARE | Age: 86
End: 2021-07-07
Payer: MEDICARE

## 2021-07-07 ENCOUNTER — HOSPICE ADMISSION (OUTPATIENT)
Dept: HOSPICE | Facility: HOSPICE | Age: 86
End: 2021-07-07

## 2021-07-07 VITALS
HEART RATE: 88 BPM | SYSTOLIC BLOOD PRESSURE: 128 MMHG | TEMPERATURE: 97.7 F | DIASTOLIC BLOOD PRESSURE: 60 MMHG | OXYGEN SATURATION: 100 % | RESPIRATION RATE: 16 BRPM

## 2021-07-07 PROCEDURE — 665002 ROC-HOME HEALTH

## 2021-07-07 PROCEDURE — G0151 HHCP-SERV OF PT,EA 15 MIN: HCPCS

## 2021-07-07 PROCEDURE — 665998 HH PPS REVENUE CREDIT

## 2021-07-07 PROCEDURE — 665997 HH PPS REVENUE ADJ

## 2021-07-07 PROCEDURE — 665999 HH PPS REVENUE DEBIT

## 2021-07-07 PROCEDURE — 665005 NO-PAY RAP - HOME HEALTH

## 2021-07-07 SDOH — ECONOMIC STABILITY: HOUSING INSECURITY: EVIDENCE OF SMOKING MATERIAL: 0

## 2021-07-07 ASSESSMENT — ACTIVITIES OF DAILY LIVING (ADL)
PHYSICAL TRANSFERS ASSESSED: 1
CURRENT_FUNCTION: TWO PERSON
CURRENT_FUNCTION: MAXIMUM ASSIST

## 2021-07-07 ASSESSMENT — ENCOUNTER SYMPTOMS
POOR JUDGMENT: 1
ARTHRALGIAS: 1
LIMITED RANGE OF MOTION: 1
DIFFICULTY THINKING: 1

## 2021-07-08 LAB
ANISOCYTOSIS BLD QL SMEAR: ABNORMAL
BACTERIA UR CULT: ABNORMAL
BACTERIA UR CULT: ABNORMAL
BASOPHILS # BLD AUTO: 0 % (ref 0–1.8)
BASOPHILS # BLD: 0 K/UL (ref 0–0.12)
EOSINOPHIL # BLD AUTO: 0 K/UL (ref 0–0.51)
EOSINOPHIL NFR BLD: 0 % (ref 0–6.9)
ERYTHROCYTE [DISTWIDTH] IN BLOOD BY AUTOMATED COUNT: 60.1 FL (ref 35.9–50)
HCT VFR BLD AUTO: 33 % (ref 37–47)
HGB BLD-MCNC: 10.3 G/DL (ref 12–16)
LYMPHOCYTES # BLD AUTO: 0.4 K/UL (ref 1–4.8)
LYMPHOCYTES NFR BLD: 2.6 % (ref 22–41)
MANUAL DIFF BLD: NORMAL
MCH RBC QN AUTO: 26.3 PG (ref 27–33)
MCHC RBC AUTO-ENTMCNC: 31.2 G/DL (ref 33.6–35)
MCV RBC AUTO: 84.4 FL (ref 81.4–97.8)
MONOCYTES # BLD AUTO: 0.28 K/UL (ref 0–0.85)
MONOCYTES NFR BLD AUTO: 1.8 % (ref 0–13.4)
NEUTROPHILS # BLD AUTO: 1.4 K/UL (ref 2–7.15)
NEUTROPHILS NFR BLD: 90.4 % (ref 44–72)
NEUTS BAND NFR BLD MANUAL: 5.2 % (ref 0–10)
PLATELET # BLD AUTO: ABNORMAL K/UL (ref 164–446)
RBC # BLD AUTO: 3.91 M/UL (ref 4.2–5.4)
RBC BLD AUTO: PRESENT
SIGNIFICANT IND 70042: ABNORMAL
SITE SITE: ABNORMAL
SOURCE SOURCE: ABNORMAL
WBC # BLD AUTO: 15.5 K/UL (ref 4.8–10.8)

## 2021-07-08 SDOH — ECONOMIC STABILITY: HOUSING INSECURITY: EVIDENCE OF SMOKING MATERIAL: 0

## 2021-07-08 ASSESSMENT — ENCOUNTER SYMPTOMS
DEBILITATING PAIN: 1
DIFFICULTY THINKING: 1

## 2021-07-08 ASSESSMENT — ACTIVITIES OF DAILY LIVING (ADL)
HOME_HEALTH_OASIS: 01
OASIS_M1830: 06

## 2021-07-08 ASSESSMENT — PATIENT HEALTH QUESTIONNAIRE - PHQ9: CLINICAL INTERPRETATION OF PHQ2 SCORE: 0

## 2021-07-12 ENCOUNTER — HOME CARE VISIT (OUTPATIENT)
Dept: HOME HEALTH SERVICES | Facility: HOME HEALTHCARE | Age: 86
End: 2021-07-12
Payer: MEDICARE

## 2021-07-12 NOTE — CASE COMMUNICATION
agree with changes  ----- Message -----  From: Gisselle Arshad R.N.  Sent: 7/12/2021  10:55 AM PDT  To: Rosemary Solo R.N.      Quality Review Completed for July 3 ANAND/RECERT OASIS by SUZI Arshad RN on July 12, 2021:  Edits completed by SUZI Arshad RN:  1.  is 7/3

## 2021-07-12 NOTE — CASE COMMUNICATION
Quality Review Completed for July 3 ANAND/RECERT OASIS by SUZI Arshad RN on July 12, 2021:  Edits completed by SUZI Arshad RN:  1.  is 7/3

## 2021-07-13 PROCEDURE — G0179 MD RECERTIFICATION HHA PT: HCPCS | Performed by: FAMILY MEDICINE

## 2021-07-15 ENCOUNTER — APPOINTMENT (OUTPATIENT)
Dept: CARDIOLOGY | Facility: MEDICAL CENTER | Age: 86
End: 2021-07-15
Attending: INTERNAL MEDICINE
Payer: MEDICARE

## (undated) DEVICE — SENSOR SPO2 NEO LNCS ADHESIVE (20/BX) SEE USER NOTES

## (undated) DEVICE — NEPTUNE 4 PORT MANIFOLD - (20/PK)

## (undated) DEVICE — FILM CASSETTE ENDO

## (undated) DEVICE — CONTAINER, SPECIMEN, STERILE

## (undated) DEVICE — ELECTRODE 850 FOAM ADHESIVE - HYDROGEL RADIOTRNSPRNT (50/PK)

## (undated) DEVICE — SET EXTENSION WITH 2 PORTS (48EA/CA) ***PART #2C8610 IS A SUBSTITUTE*****

## (undated) DEVICE — FORCEP RADIAL JAW 4 STANDARD CAPACITY W/NEEDLE 240CM (40EA/BX)

## (undated) DEVICE — KIT CUSTOM PROCEDURE SINGLE FOR ENDO  (15/CA)

## (undated) DEVICE — CANISTER SUCTION RIGID RED 1500CC (40EA/CA)

## (undated) DEVICE — MANIFOLD NEPTUNE 1 PORT (20/PK)

## (undated) DEVICE — CANISTER SUCTION 3000ML MECHANICAL FILTER AUTO SHUTOFF MEDI-VAC NONSTERILE LF DISP  (40EA/CA)

## (undated) DEVICE — TOWEL STOP TIMEOUT SAFETY FLAG (40EA/CA)

## (undated) DEVICE — TUBING CLEARLINK DUO-VENT - C-FLO (48EA/CA)